# Patient Record
Sex: MALE | Race: WHITE | Employment: OTHER | URBAN - METROPOLITAN AREA
[De-identification: names, ages, dates, MRNs, and addresses within clinical notes are randomized per-mention and may not be internally consistent; named-entity substitution may affect disease eponyms.]

---

## 2019-01-03 ENCOUNTER — HOSPITAL ENCOUNTER (OUTPATIENT)
Dept: SURGERY | Age: 79
Discharge: HOME | End: 2019-01-03
Attending: OPHTHALMOLOGY

## 2019-01-03 VITALS
WEIGHT: 187 LBS | BODY MASS INDEX: 27.7 KG/M2 | HEIGHT: 69 IN | TEMPERATURE: 96.8 F | HEART RATE: 69 BPM | SYSTOLIC BLOOD PRESSURE: 166 MMHG | OXYGEN SATURATION: 96 % | DIASTOLIC BLOOD PRESSURE: 86 MMHG

## 2019-01-03 RX ORDER — HYDROCODONE BITARTRATE AND ACETAMINOPHEN 7.5; 325 MG/1; MG/1
1 TABLET ORAL
COMMUNITY

## 2019-01-03 RX ORDER — PREDNISONE 5 MG/1
5 TABLET ORAL DAILY
COMMUNITY

## 2019-01-03 RX ORDER — MELATONIN
1000 DAILY
COMMUNITY

## 2019-01-03 RX ORDER — TAMSULOSIN HYDROCHLORIDE 0.4 MG/1
0.4 CAPSULE ORAL DAILY
COMMUNITY

## 2019-01-03 RX ORDER — FLUTICASONE FUROATE AND VILANTEROL 200; 25 UG/1; UG/1
1 POWDER RESPIRATORY (INHALATION) DAILY
Status: ON HOLD | COMMUNITY
End: 2019-04-16

## 2019-01-03 RX ORDER — TRAVOPROST OPHTHALMIC SOLUTION 0.04 MG/ML
1 SOLUTION OPHTHALMIC
COMMUNITY

## 2019-01-03 RX ORDER — GUAIFENESIN 1200 MG
1000 TABLET, EXTENDED RELEASE 12 HR ORAL
COMMUNITY

## 2019-01-03 RX ORDER — ASPIRIN 325 MG
325 TABLET ORAL DAILY
COMMUNITY
End: 2019-01-04

## 2019-01-03 RX ORDER — ALBUTEROL SULFATE 0.83 MG/ML
2.5 SOLUTION RESPIRATORY (INHALATION)
COMMUNITY

## 2019-01-03 RX ORDER — ALBUTEROL SULFATE 90 UG/1
2 AEROSOL, METERED RESPIRATORY (INHALATION)
COMMUNITY

## 2019-01-03 NOTE — PERIOP NOTES
Received call from HiginioTrufant at Dr. Darlene Ross office. Reports she has discussed pt and need for cardiac clearance with Dr. Indy Hawk. Per [facility], Dr. Indy Hawk wants to keep pt on the OR scheduled for 1/8/19. However, requests that pt be the last pt/case for the day on the 8th. This RN [facility], spoke with [place].  Informed of above, pt moved to last case of the day for 1/8/19 per Dr. Abhi Conte request.

## 2019-01-03 NOTE — PERIOP NOTES
Patient verified name and     Order for consent found in EHR and matches case posting; patient verified. Type 1B surgery, walk in assessment complete. Labs per surgeon: none needed. Labs per anesthesia protocol: none needed. EKG: not needed per [facility] protocols. Patient poor historian. Most of medical history obtained from wife who was present here with patient today. Patient has significant medical history including a AAA, CAD, SANCHEZ, and heart Murmur. Also has had anesthesia complications in the past (stopped breathing; almost lost him) but none with recent surgeries in 2018. Hasn't seen cardiology in at least 5-6 yrs. Dr. Tommy Amos with anesthesia notified of this and came over to see patient in person. Per Dr. Tommy Amos patient needs to be seen and evaluated by cardiology for cardiac clearance prior to surgery and requests old anesthesia records from [facility]. Called and spoke with Yanet Nieto at Dr. Rizwan Moe office regarding Dr. Leighton Doan request. States she will inform Dr. Liana Medel on Monday when she is back from vacation. Patient and wife unsure of what cardiologist/practice the patient has seen in the past other than it was someone with [facility]. Also doesn't remember year gallbladder surgery was done when the anesthesia complications occurred. Called [facility] and left  requesting most recent ekg, stress, echo, heart cath, and old anesthesia event post gallbladder removal greater than 5 yrs ago. Received ekg dated 18 and heart cath dated 14. A doctor Estevan Sotomayor did the heart cath who was apparently with [facility] cardiology at that time. Requesting records from [facility] cardiology via fax from years [id number] if available. Called and received [facility] note dated 18. Note on chart for reference. PCP office note (18) and Vascular note (09/05/15)  available in EHR for reference if needed. Hibiclens and instructions given per hospital policy.     Patient provided with and instructed on educational handouts including Guide to Surgery, Pain Management, Hand Hygiene, Blood Transfusion Education, and [place] [facility] Brochure. Patient answered medical/surgical history questions at their best of ability. All prior to admission medications documented in [facility]. Original medication prescription bottles not visualized during patient appointment. Patient instructed to hold all vitamins 7 days prior to surgery and NSAIDS 5 days prior to surgery, patient verbalized understanding. Medications to be held: 325 mg aspirin to be decreased to one 81 mg aspirin on 01/04/19, vitamin D. Patient instructed to continue previous medications as prescribed prior to surgery and to take the following medications the day of surgery according to anesthesia guidelines with a small sip of water: proair inhaler if needed, albuterol neb if needed, 81 mg aspirin, breo, prednisone, tamsulosin. Instructed to bring inhalers dos. Patient teach back successful and patient demonstrates knowledge of instructions.

## 2019-01-04 PROBLEM — I25.10 CORONARY ARTERY DISEASE INVOLVING NATIVE CORONARY ARTERY OF NATIVE HEART WITHOUT ANGINA PECTORIS: Status: ACTIVE | Noted: 2019-01-04

## 2019-01-04 PROBLEM — J43.8 OTHER EMPHYSEMA (HCC): Status: ACTIVE | Noted: 2019-01-04

## 2019-01-04 PROBLEM — R01.1 MURMUR: Status: ACTIVE | Noted: 2019-01-04

## 2019-01-04 PROBLEM — Z01.818 PRE-OP EXAM: Status: ACTIVE | Noted: 2019-01-04

## 2019-01-04 NOTE — PERIOP NOTES
Received from [facility] stress (4/11/14) and office note with ekg (4/2/14) - of note pt has [facility] consult today @ 1300

## 2019-01-04 NOTE — PERIOP NOTES
[facility] Cardiology note dated today (1/4/18) in EMR that reads:    \"ASSESSMENT and PLAN  1. Establishing care with new doctor, encounter for  Stable. Continue current medical therapy.     - AMB POC EKG ROUTINE W/ 12 LEADS, INTER & REP     2. Coronary artery disease involving native coronary artery of native heart without angina pectoris  Stable. Continue current medical therapy. No angnina     3. Murmur  AS by exam- echo before surgery     echo  4. Pre-op exam  Had anesthesia without problem 98/19-WPIM risk of cv complications with severe AS  5. Other emphysema ([address]  Stable. Continue current medical therapy.     6. HTN- will assess bp at next visit and if elevated will address     7. Severe AS- echo with mean gradient 40 and martin 0.85- high risk for cv complications- lopez Moon- he will delay surgery and we will refer for tavr consult- previous sternotomy and copd likely will be tavr candidate       Detailed message left for [facility] - assistant and surgery scheduler for Dr. Josh Alicea - re: above.

## 2019-01-10 ENCOUNTER — HOSPITAL ENCOUNTER (OUTPATIENT)
Dept: LAB | Age: 79
Discharge: HOME | End: 2019-01-10
Payer: MEDICARE

## 2019-01-10 DIAGNOSIS — I35.0 NONRHEUMATIC AORTIC VALVE STENOSIS: Chronic | ICD-10-CM

## 2019-01-10 LAB
ANION GAP SERPL CALC-SCNC: 8 MMOL/L
BASOPHILS # BLD: 0.1 K/UL (ref 0–0.2)
BASOPHILS NFR BLD: 1 % (ref 0–2)
BUN SERPL-MCNC: 15 MG/DL (ref 8–23)
CALCIUM SERPL-MCNC: 8.6 MG/DL (ref 8.3–10.4)
CHLORIDE SERPL-SCNC: 110 MMOL/L (ref 98–107)
CO2 SERPL-SCNC: 23 MMOL/L (ref 21–32)
CREAT SERPL-MCNC: 1.6 MG/DL (ref 0.8–1.5)
DIFFERENTIAL METHOD BLD: NORMAL
EOSINOPHIL # BLD: 0.1 K/UL (ref 0–0.8)
EOSINOPHIL NFR BLD: 1 % (ref 0.5–7.8)
ERYTHROCYTE [DISTWIDTH] IN BLOOD BY AUTOMATED COUNT: 13.5 % (ref 11.9–14.6)
GLUCOSE SERPL-MCNC: 123 MG/DL (ref 65–100)
HCT VFR BLD AUTO: 45.1 % (ref 41.1–50.3)
HGB BLD-MCNC: 14.6 G/DL (ref 13.6–17.2)
IMM GRANULOCYTES # BLD AUTO: 0 K/UL (ref 0–0.5)
IMM GRANULOCYTES NFR BLD AUTO: 0 % (ref 0–5)
LYMPHOCYTES # BLD: 2.6 K/UL (ref 0.5–4.6)
LYMPHOCYTES NFR BLD: 25 % (ref 13–44)
MCH RBC QN AUTO: 31.3 PG (ref 26.1–32.9)
MCHC RBC AUTO-ENTMCNC: 32.4 G/DL (ref 31.4–35)
MCV RBC AUTO: 96.8 FL (ref 79.6–97.8)
MONOCYTES # BLD: 0.6 K/UL (ref 0.1–1.3)
MONOCYTES NFR BLD: 6 % (ref 4–12)
NEUTS SEG # BLD: 7.2 K/UL (ref 1.7–8.2)
NEUTS SEG NFR BLD: 68 % (ref 43–78)
NRBC # BLD: 0 K/UL (ref 0–0.2)
PLATELET # BLD AUTO: 220 K/UL (ref 150–450)
PMV BLD AUTO: 11 FL (ref 9.4–12.3)
POTASSIUM SERPL-SCNC: 4.2 MMOL/L (ref 3.5–5.1)
RBC # BLD AUTO: 4.66 M/UL (ref 4.23–5.6)
SODIUM SERPL-SCNC: 141 MMOL/L (ref 136–145)
WBC # BLD AUTO: 10.6 K/UL (ref 4.3–11.1)

## 2019-01-10 PROCEDURE — 36415 COLL VENOUS BLD VENIPUNCTURE: CPT

## 2019-01-10 PROCEDURE — 85025 COMPLETE CBC W/AUTO DIFF WBC: CPT

## 2019-01-10 PROCEDURE — 80048 BASIC METABOLIC PNL TOTAL CA: CPT

## 2019-01-18 ENCOUNTER — HOSPITAL ENCOUNTER (OUTPATIENT)
Dept: CARDIAC CATH/INVASIVE PROCEDURES | Age: 79
Discharge: HOME | End: 2019-01-18
Attending: INTERNAL MEDICINE | Admitting: INTERNAL MEDICINE
Payer: MEDICARE

## 2019-01-18 VITALS
OXYGEN SATURATION: 95 % | HEART RATE: 84 BPM | DIASTOLIC BLOOD PRESSURE: 79 MMHG | TEMPERATURE: 98.1 F | HEIGHT: 68 IN | WEIGHT: 189 LBS | BODY MASS INDEX: 28.64 KG/M2 | SYSTOLIC BLOOD PRESSURE: 165 MMHG

## 2019-01-18 LAB
ANION GAP SERPL CALC-SCNC: 7 MMOL/L (ref 7–16)
BUN SERPL-MCNC: 16 MG/DL (ref 8–23)
CALCIUM SERPL-MCNC: 8.7 MG/DL (ref 8.3–10.4)
CHLORIDE SERPL-SCNC: 111 MMOL/L (ref 98–107)
CO2 SERPL-SCNC: 24 MMOL/L (ref 21–32)
CREAT SERPL-MCNC: 1.51 MG/DL (ref 0.8–1.5)
GLUCOSE SERPL-MCNC: 97 MG/DL (ref 65–100)
POTASSIUM SERPL-SCNC: 4.1 MMOL/L (ref 3.5–5.1)
SODIUM SERPL-SCNC: 142 MMOL/L (ref 136–145)

## 2019-01-18 PROCEDURE — 99153 MOD SED SAME PHYS/QHP EA: CPT

## 2019-01-18 PROCEDURE — 77030004559 HC CATH ANGI DX SUPT CARD -B

## 2019-01-18 PROCEDURE — 74011636320 HC RX REV CODE- 636/320: Performed by: INTERNAL MEDICINE

## 2019-01-18 PROCEDURE — C1769 GUIDE WIRE: HCPCS

## 2019-01-18 PROCEDURE — 74011250636 HC RX REV CODE- 250/636

## 2019-01-18 PROCEDURE — 77030019569 HC BND COMPR RAD TERU -B

## 2019-01-18 PROCEDURE — 74011000258 HC RX REV CODE- 258: Performed by: INTERNAL MEDICINE

## 2019-01-18 PROCEDURE — 74011000250 HC RX REV CODE- 250: Performed by: INTERNAL MEDICINE

## 2019-01-18 PROCEDURE — 77030004534 HC CATH ANGI DX INFN CARD -A

## 2019-01-18 PROCEDURE — 75630 X-RAY AORTA LEG ARTERIES: CPT

## 2019-01-18 PROCEDURE — 75716 ARTERY X-RAYS ARMS/LEGS: CPT

## 2019-01-18 PROCEDURE — 93455 CORONARY ART/GRFT ANGIO S&I: CPT

## 2019-01-18 PROCEDURE — 74011250636 HC RX REV CODE- 250/636: Performed by: INTERNAL MEDICINE

## 2019-01-18 PROCEDURE — C1894 INTRO/SHEATH, NON-LASER: HCPCS

## 2019-01-18 PROCEDURE — 99152 MOD SED SAME PHYS/QHP 5/>YRS: CPT

## 2019-01-18 PROCEDURE — 80048 BASIC METABOLIC PNL TOTAL CA: CPT

## 2019-01-18 RX ORDER — FENTANYL CITRATE 50 UG/ML
25-100 INJECTION, SOLUTION INTRAMUSCULAR; INTRAVENOUS
Status: DISCONTINUED | OUTPATIENT
Start: 2019-01-18 | End: 2019-01-18 | Stop reason: HOSPADM

## 2019-01-18 RX ORDER — SODIUM CHLORIDE 9 MG/ML
1 INJECTION, SOLUTION INTRAVENOUS AS NEEDED
Status: DISCONTINUED | OUTPATIENT
Start: 2019-01-18 | End: 2019-01-18 | Stop reason: HOSPADM

## 2019-01-18 RX ORDER — SODIUM CHLORIDE 0.9 % (FLUSH) 0.9 %
5-40 SYRINGE (ML) INJECTION AS NEEDED
Status: DISCONTINUED | OUTPATIENT
Start: 2019-01-18 | End: 2019-01-18 | Stop reason: HOSPADM

## 2019-01-18 RX ORDER — HEPARIN SODIUM 200 [USP'U]/100ML
2 INJECTION, SOLUTION INTRAVENOUS CONTINUOUS
Status: DISCONTINUED | OUTPATIENT
Start: 2019-01-18 | End: 2019-01-18 | Stop reason: HOSPADM

## 2019-01-18 RX ORDER — MIDAZOLAM HYDROCHLORIDE 1 MG/ML
.5-2 INJECTION, SOLUTION INTRAMUSCULAR; INTRAVENOUS
Status: DISCONTINUED | OUTPATIENT
Start: 2019-01-18 | End: 2019-01-18 | Stop reason: HOSPADM

## 2019-01-18 RX ORDER — SODIUM CHLORIDE 9 MG/ML
3 INJECTION, SOLUTION INTRAVENOUS ONCE
Status: COMPLETED | OUTPATIENT
Start: 2019-01-18 | End: 2019-01-18

## 2019-01-18 RX ORDER — LIDOCAINE HYDROCHLORIDE 10 MG/ML
2-20 INJECTION INFILTRATION; PERINEURAL
Status: DISCONTINUED | OUTPATIENT
Start: 2019-01-18 | End: 2019-01-18 | Stop reason: HOSPADM

## 2019-01-18 RX ORDER — SODIUM CHLORIDE 9 MG/ML
250 INJECTION, SOLUTION INTRAVENOUS CONTINUOUS
Status: ACTIVE | OUTPATIENT
Start: 2019-01-18 | End: 2019-01-18

## 2019-01-18 RX ORDER — GUAIFENESIN 100 MG/5ML
81-324 LIQUID (ML) ORAL ONCE
Status: DISCONTINUED | OUTPATIENT
Start: 2019-01-18 | End: 2019-01-18 | Stop reason: HOSPADM

## 2019-01-18 RX ORDER — SODIUM CHLORIDE 0.9 % (FLUSH) 0.9 %
5-40 SYRINGE (ML) INJECTION EVERY 8 HOURS
Status: DISCONTINUED | OUTPATIENT
Start: 2019-01-18 | End: 2019-01-18 | Stop reason: HOSPADM

## 2019-01-18 RX ORDER — DIAZEPAM 5 MG/1
5 TABLET ORAL ONCE
Status: DISCONTINUED | OUTPATIENT
Start: 2019-01-18 | End: 2019-01-18 | Stop reason: HOSPADM

## 2019-01-18 RX ADMIN — HEPARIN SODIUM 2 ML/HR: 5000 INJECTION, SOLUTION INTRAVENOUS; SUBCUTANEOUS at 13:30

## 2019-01-18 RX ADMIN — LIDOCAINE HYDROCHLORIDE 2 ML: 10 INJECTION, SOLUTION INFILTRATION; PERINEURAL at 13:50

## 2019-01-18 RX ADMIN — MIDAZOLAM HYDROCHLORIDE 2 MG: 1 INJECTION, SOLUTION INTRAMUSCULAR; INTRAVENOUS at 13:40

## 2019-01-18 RX ADMIN — IOPAMIDOL 150 ML: 755 INJECTION, SOLUTION INTRAVENOUS at 14:23

## 2019-01-18 RX ADMIN — SODIUM CHLORIDE 3 ML/KG/HR: 900 INJECTION, SOLUTION INTRAVENOUS at 10:51

## 2019-01-18 RX ADMIN — FENTANYL CITRATE 50 MCG: 50 INJECTION, SOLUTION INTRAMUSCULAR; INTRAVENOUS at 13:39

## 2019-01-18 RX ADMIN — SODIUM CHLORIDE 1 ML/KG/HR: 900 INJECTION, SOLUTION INTRAVENOUS at 11:52

## 2019-01-18 RX ADMIN — HEPARIN SODIUM 2 ML: 10000 INJECTION, SOLUTION INTRAVENOUS; SUBCUTANEOUS at 13:50

## 2019-01-18 NOTE — PROGRESS NOTES
Patient received to [address] [place] [address] room # 10  Ambulatory from [facility]. Patient scheduled for [facility] today with Dr Talya Harris. Procedure reviewed & questions answered, voiced good understanding consent obtained & placed on chart. All medications and medical history reviewed. Will prep patient per orders. Patient & family updated on plan of care. The patient has a fraility score of 3-MANAGING WELL, based on patient A&Ox3, patient able to ambulate to room without difficulty.

## 2019-01-18 NOTE — PROGRESS NOTES
TRANSFER - OUT REPORT: 
 
Verbal report given to Beto Real RN(name) on Migue Adam  being transferred to CPRU(unit) for routine progression of care Report consisted of patients Situation, Background, Assessment and  
Recommendations(SBAR). Information from the following report(s) Procedure Summary, MAR and Cardiac Rhythm NSR was reviewed with the receiving nurse. Lines:  
Peripheral IV 01/18/19 Distal;Right Antecubital (Active) Peripheral IV 01/18/19 Left Antecubital (Active) Opportunity for questions and clarification was provided. Patient transported with: 
 Registered Nurse [facility] Dr Joyce Fernandez Diagnostic only for pre op clearance Left radial access - TR band @ 1418 w/ 12 ml in band Site C/D/I Versed 2mg IV Fent 50 mcg IV

## 2019-01-18 NOTE — PROCEDURES
[address]    Rico Rubio  MR#: [id number]  : 1940  ACCOUNT #: [de-identified]   DATE OF SERVICE: 2019    PRIMARY CARDIOLOGIST:  Dr. Norbert Mancuso:  Dr. Stephanie Lobo. BRIEF HISTORY:  The patient is a 72-year-old gentleman with history of prior coronary bypass grafting. He is seen preoperative for eye surgery. He was noticed to have a murmur. He was referred to the office and found to have severe aortic stenosis. He is referred for TAVR workup. PROCEDURE:  After informed consent obtained, he was prepped and draped in usual sterile fashion. The left wrist was infiltrated with lidocaine. Left radial artery was accessed. A 6-Congolese sheath was advanced. The 6-Congolese LIMA catheter was utilized for left internal mammary artery injection. A 6-Congolese JL 3.5 was utilized for left coronary injection. A 6-Congolese Alberto right was utilized for right coronary injections. Multipurpose catheter was utilized for saphenous vein graft injection to the obtuse marginal, saphenous vein graft injection to the ramus intermedius. A long multipurpose catheter was utilized for bilateral selective iliofemoral runoff and abdominal aortography. CONTRAST:  Isovue. FINDINGS:  Conscious sedation with a start time of 1336, end time 1419. MEDICATIONS:  2 mg of Versed and 50 mcg fentanyl. MONITORING RN:  Munira Stahl. 1.  Left main:  Left main is moderate in size. The only patent vessel is the ramus intermedius. 2.  Left anterior descending coronary artery:  Occluded. 3.  Ramus intermedius:  50% stenosis. Graft seen filling well. 4.  Left circumflex coronary:  Occluded. 5.  Right coronary:  There are 2 separate branches. It is nondominant in nature. They remain widely patent. 6.  Left internal mammary artery to the LAD:  Large graft, good proximal takeoff, good distal anastomosis, fills the LAD well.   7. Saphenous vein graft to the obtuse marginal:  This is a large graft, good proximal takeoff, good distal anastomosis, fills the obtuse marginal well. This backfills the distal left circumflex which is dominant in nature. There is a 70% stenosis at the ostium of the obtuse marginal in a retrograde fashion as it fills the left dominant circumflex. 8.  Saphenous vein graft to the ramus intermedius is a large graft, good proximal takeoff, good distal anastomosis, fills the ramus intermedius well. 9.  There appears to be a significant abdominal aortic aneurysm present. This will be evaluated by CTA. 10.  Large iliofemoral systems which appear appropriate for TAVR access. Thank you for allowing me to participate in the care of this patient. If you have questions or concerns, please feel free to contact me.       MD MCKINLEY Latif / [place]  D: 01/18/2019 14:29     T: 01/18/2019 15:40  JOB #: [id number]  CC: Lalit Wheeler MD  CC: Quentin Abdul MD  [address] [place] [address]

## 2019-01-18 NOTE — DISCHARGE INSTRUCTIONS

## 2019-01-18 NOTE — PROCEDURES
Brief Cardiac Procedure Note    Patient: Bell Jacobsen MRN: [id number]  SSN: [id number]    YOB: 1940  Age: 69 y.o. Sex: male      Date of Procedure: 1/18/2019     Pre-procedure Diagnosis: Aortic Stenosis    Post-procedure Diagnosis: Aortic Stenosis    Procedure: Left Heart Catheterization    Brief Description of Procedure: See note    Performed By: Cassie Garcia MD     Assistants: None    Anesthesia: Moderate Sedation    Estimated Blood Loss: Less than 10 mL      Specimens: None    Implants: None    Findings:     LM:  NML  LAD:  100%  RI:  50%  LCx:  100%  RCA:  Patent    LIMA-LAD:  Patent  SVG-RI:  Patent  SVG-OM:  Patent and back fills (L) dominant Cx    AAA seen  Large ilio-femorals bilaterally    Complications: None    Recommendations: Continue medical therapy.     Signed By: Cassie Garcia MD     January 18, 2019

## 2019-01-18 NOTE — PROGRESS NOTES
Patient pre-assessment complete for [facility] poss with DR Chapo Agudelo scheduled for 19 at 12:30pm , arrival time 10:30am. Patient verified using . Patient instructed to bring all home medications in labeled bottles on the day of procedure. NPO status reinforced. Patient informed to take a full dose aspirin 325mg  or 81 mg x 4 on the day of procedure. Instructed they can take all other medications excluding vitamins & supplements. Patient verbalizes understanding of all instructions & denies any questions at this time.

## 2019-01-19 DIAGNOSIS — R06.02 SOB (SHORTNESS OF BREATH): ICD-10-CM

## 2019-01-19 DIAGNOSIS — I35.0 AORTIC VALVE STENOSIS, ETIOLOGY OF CARDIAC VALVE DISEASE UNSPECIFIED: Primary | ICD-10-CM

## 2019-01-19 NOTE — PROGRESS NOTES
Educational information/pamphlet provided to the pt and family regarding aortic stenosis and treatment options (SAVR and TAVR). Also discussed plans and dates for upcoming tests Jan. 24th (PFT, carotid US, and TAVR CT) to evaluate pt for potential TAVR. Appointments with cardiac surgery for evaluation were also provided. Prescription for BB prior to CT scan was given. Contact information provided. All information was provided to the pt and family by Kelli Salinas RN and pt and family verbalized understanding. Bessy Jones, TAVR Coordinator

## 2019-01-24 ENCOUNTER — HOSPITAL ENCOUNTER (OUTPATIENT)
Dept: CT IMAGING | Age: 79
Discharge: HOME | End: 2019-01-24
Attending: INTERNAL MEDICINE
Payer: MEDICARE

## 2019-01-24 ENCOUNTER — HOSPITAL ENCOUNTER (OUTPATIENT)
Dept: ULTRASOUND IMAGING | Age: 79
Discharge: HOME | End: 2019-01-24
Attending: INTERNAL MEDICINE
Payer: MEDICARE

## 2019-01-24 ENCOUNTER — HOSPITAL ENCOUNTER (OUTPATIENT)
Dept: RESPIRATORY THERAPY | Age: 79
Discharge: HOME | End: 2019-01-24
Attending: INTERNAL MEDICINE
Payer: MEDICARE

## 2019-01-24 DIAGNOSIS — R06.02 SOB (SHORTNESS OF BREATH): ICD-10-CM

## 2019-01-24 DIAGNOSIS — I35.0 AORTIC VALVE STENOSIS, ETIOLOGY OF CARDIAC VALVE DISEASE UNSPECIFIED: ICD-10-CM

## 2019-01-24 LAB — CREAT BLD-MCNC: 1.5 MG/DL (ref 0.8–1.5)

## 2019-01-24 PROCEDURE — 94729 DIFFUSING CAPACITY: CPT

## 2019-01-24 PROCEDURE — 74011636320 HC RX REV CODE- 636/320: Performed by: INTERNAL MEDICINE

## 2019-01-24 PROCEDURE — 74011000258 HC RX REV CODE- 258: Performed by: INTERNAL MEDICINE

## 2019-01-24 PROCEDURE — 82565 ASSAY OF CREATININE: CPT

## 2019-01-24 PROCEDURE — 94010 BREATHING CAPACITY TEST: CPT

## 2019-01-24 PROCEDURE — 93880 EXTRACRANIAL BILAT STUDY: CPT

## 2019-01-24 PROCEDURE — 94726 PLETHYSMOGRAPHY LUNG VOLUMES: CPT

## 2019-01-24 PROCEDURE — 74174 CTA ABD&PLVS W/CONTRAST: CPT

## 2019-01-24 PROCEDURE — 75574 CT ANGIO HRT W/3D IMAGE: CPT

## 2019-01-24 RX ORDER — SODIUM CHLORIDE 0.9 % (FLUSH) 0.9 %
10 SYRINGE (ML) INJECTION
Status: COMPLETED | OUTPATIENT
Start: 2019-01-24 | End: 2019-01-24

## 2019-01-24 RX ADMIN — SODIUM CHLORIDE 175 ML: 900 INJECTION, SOLUTION INTRAVENOUS at 10:47

## 2019-01-24 RX ADMIN — IOPAMIDOL 150 ML: 755 INJECTION, SOLUTION INTRAVENOUS at 10:47

## 2019-01-24 RX ADMIN — Medication 10 ML: at 10:47

## 2019-01-31 NOTE — PROCEDURES
[address]  PROCEDURE NOTE    Garima Elizabeth  MR#: [id number]  : 1940  ACCOUNT #: [de-identified]   DATE OF SERVICE: 2019    REPORT:  1.  Mild obstruction on spirometry and flow volume loops. 2.  Lung volumes are normal.  3.  Diffusion capacity is moderately reduced. IMPRESSION: Obstruction with mild to moderate reduction in diffusion capacity is consistent with moderate COPD likely in this case. Clinical correlation is required.       MD PATRICIA Vicente / MARQUIS  D: 2019 17:23     T: 2019 19:32  JOB #: [id number]

## 2019-02-05 RX ORDER — CEFAZOLIN SODIUM/WATER 2 G/20 ML
2 SYRINGE (ML) INTRAVENOUS ONCE
Status: CANCELLED | OUTPATIENT
Start: 2019-02-12 | End: 2019-02-12

## 2019-02-07 PROBLEM — I10 HYPERTENSION: Chronic | Status: ACTIVE | Noted: 2019-02-07

## 2019-02-11 ENCOUNTER — HOSPITAL ENCOUNTER (OUTPATIENT)
Dept: GENERAL RADIOLOGY | Age: 79
Discharge: HOME | DRG: 267 | End: 2019-02-11
Attending: PHYSICIAN ASSISTANT
Payer: MEDICARE

## 2019-02-11 ENCOUNTER — ANESTHESIA EVENT (OUTPATIENT)
Dept: SURGERY | Age: 79
DRG: 267 | End: 2019-02-11
Payer: MEDICARE

## 2019-02-11 ENCOUNTER — HOSPITAL ENCOUNTER (OUTPATIENT)
Dept: SURGERY | Age: 79
Discharge: HOME | DRG: 267 | End: 2019-02-11
Payer: MEDICARE

## 2019-02-11 VITALS
OXYGEN SATURATION: 97 % | BODY MASS INDEX: 27.41 KG/M2 | WEIGHT: 191.5 LBS | HEIGHT: 70 IN | SYSTOLIC BLOOD PRESSURE: 158 MMHG | TEMPERATURE: 97.5 F | DIASTOLIC BLOOD PRESSURE: 64 MMHG | HEART RATE: 76 BPM | RESPIRATION RATE: 16 BRPM

## 2019-02-11 LAB
ALBUMIN SERPL-MCNC: 3.7 G/DL (ref 3.2–4.6)
ALBUMIN/GLOB SERPL: 1 {RATIO} (ref 1.2–3.5)
ALP SERPL-CCNC: 83 U/L (ref 50–136)
ALT SERPL-CCNC: 16 U/L (ref 12–65)
ANION GAP SERPL CALC-SCNC: 4 MMOL/L (ref 7–16)
APPEARANCE UR: ABNORMAL
AST SERPL-CCNC: 17 U/L (ref 15–37)
ATRIAL RATE: 74 BPM
BACTERIA SPEC CULT: ABNORMAL
BACTERIA URNS QL MICRO: ABNORMAL /HPF
BILIRUB SERPL-MCNC: 0.3 MG/DL (ref 0.2–1.1)
BILIRUB UR QL: NEGATIVE
BNP SERPL-MCNC: 294 PG/ML
BUN SERPL-MCNC: 20 MG/DL (ref 8–23)
CALCIUM SERPL-MCNC: 9.8 MG/DL (ref 8.3–10.4)
CALCULATED P AXIS, ECG09: 82 DEGREES
CALCULATED R AXIS, ECG10: -150 DEGREES
CALCULATED T AXIS, ECG11: 29 DEGREES
CASTS URNS QL MICRO: ABNORMAL /LPF
CHLORIDE SERPL-SCNC: 109 MMOL/L (ref 98–107)
CO2 SERPL-SCNC: 30 MMOL/L (ref 21–32)
COLOR UR: YELLOW
CREAT SERPL-MCNC: 1.53 MG/DL (ref 0.8–1.5)
DIAGNOSIS, 93000: NORMAL
EPI CELLS #/AREA URNS HPF: 0 /HPF
ERYTHROCYTE [DISTWIDTH] IN BLOOD BY AUTOMATED COUNT: 13.2 % (ref 11.9–14.6)
EST. AVERAGE GLUCOSE BLD GHB EST-MCNC: 143 MG/DL
GLOBULIN SER CALC-MCNC: 3.8 G/DL (ref 2.3–3.5)
GLUCOSE SERPL-MCNC: 104 MG/DL (ref 65–100)
GLUCOSE UR STRIP.AUTO-MCNC: NEGATIVE MG/DL
HBA1C MFR BLD: 6.6 % (ref 4.8–6)
HCT VFR BLD AUTO: 49.3 % (ref 41.1–50.3)
HGB BLD-MCNC: 15.9 G/DL (ref 13.6–17.2)
HGB UR QL STRIP: ABNORMAL
INR PPP: 1
KETONES UR QL STRIP.AUTO: NEGATIVE MG/DL
LEUKOCYTE ESTERASE UR QL STRIP.AUTO: ABNORMAL
MAGNESIUM SERPL-MCNC: 2.4 MG/DL (ref 1.8–2.4)
MCH RBC QN AUTO: 31.8 PG (ref 26.1–32.9)
MCHC RBC AUTO-ENTMCNC: 32.3 G/DL (ref 31.4–35)
MCV RBC AUTO: 98.6 FL (ref 79.6–97.8)
NITRITE UR QL STRIP.AUTO: POSITIVE
NRBC # BLD: 0 K/UL (ref 0–0.2)
P-R INTERVAL, ECG05: 206 MS
PH UR STRIP: 5.5 [PH] (ref 5–9)
PLATELET # BLD AUTO: 185 K/UL (ref 150–450)
PMV BLD AUTO: 11.8 FL (ref 9.4–12.3)
POTASSIUM SERPL-SCNC: 4.7 MMOL/L (ref 3.5–5.1)
PROT SERPL-MCNC: 7.5 G/DL (ref 6.3–8.2)
PROT UR STRIP-MCNC: ABNORMAL MG/DL
PROTHROMBIN TIME: 13.2 SEC (ref 11.7–14.5)
Q-T INTERVAL, ECG07: 390 MS
QRS DURATION, ECG06: 138 MS
QTC CALCULATION (BEZET), ECG08: 432 MS
RBC # BLD AUTO: 5 M/UL (ref 4.23–5.6)
RBC #/AREA URNS HPF: ABNORMAL /HPF
SERVICE CMNT-IMP: ABNORMAL
SODIUM SERPL-SCNC: 143 MMOL/L (ref 136–145)
SP GR UR REFRACTOMETRY: 1.02 (ref 1–1.02)
UROBILINOGEN UR QL STRIP.AUTO: 0.2 EU/DL (ref 0.2–1)
VENTRICULAR RATE, ECG03: 74 BPM
WBC # BLD AUTO: 10.2 K/UL (ref 4.3–11.1)
WBC URNS QL MICRO: ABNORMAL /HPF

## 2019-02-11 PROCEDURE — 77030027138 HC INCENT SPIROMETER -A

## 2019-02-11 PROCEDURE — 87086 URINE CULTURE/COLONY COUNT: CPT

## 2019-02-11 PROCEDURE — 87641 MR-STAPH DNA AMP PROBE: CPT

## 2019-02-11 PROCEDURE — 83036 HEMOGLOBIN GLYCOSYLATED A1C: CPT

## 2019-02-11 PROCEDURE — 83880 ASSAY OF NATRIURETIC PEPTIDE: CPT

## 2019-02-11 PROCEDURE — 71045 X-RAY EXAM CHEST 1 VIEW: CPT

## 2019-02-11 PROCEDURE — 86920 COMPATIBILITY TEST SPIN: CPT

## 2019-02-11 PROCEDURE — 83735 ASSAY OF MAGNESIUM: CPT

## 2019-02-11 PROCEDURE — 86900 BLOOD TYPING SEROLOGIC ABO: CPT

## 2019-02-11 PROCEDURE — 80053 COMPREHEN METABOLIC PANEL: CPT

## 2019-02-11 PROCEDURE — 81001 URINALYSIS AUTO W/SCOPE: CPT

## 2019-02-11 PROCEDURE — 93005 ELECTROCARDIOGRAM TRACING: CPT | Performed by: PHYSICIAN ASSISTANT

## 2019-02-11 PROCEDURE — 85027 COMPLETE CBC AUTOMATED: CPT

## 2019-02-11 PROCEDURE — 85610 PROTHROMBIN TIME: CPT

## 2019-02-11 RX ORDER — VANCOMYCIN/0.9 % SOD CHLORIDE 1.5G/250ML
1500 PLASTIC BAG, INJECTION (ML) INTRAVENOUS
Status: COMPLETED | OUTPATIENT
Start: 2019-02-12 | End: 2019-02-12

## 2019-02-11 RX ORDER — VANCOMYCIN/0.9 % SOD CHLORIDE 1.5G/250ML
1500 PLASTIC BAG, INJECTION (ML) INTRAVENOUS
Status: CANCELLED | OUTPATIENT
Start: 2019-02-12 | End: 2019-02-13

## 2019-02-11 RX ORDER — CEFAZOLIN SODIUM/WATER 2 G/20 ML
2 SYRINGE (ML) INTRAVENOUS
Status: COMPLETED | OUTPATIENT
Start: 2019-02-12 | End: 2019-02-12

## 2019-02-11 NOTE — PERIOP NOTES
Recent Results (from the past 12 hour(s)) URINALYSIS W/ RFLX MICROSCOPIC Collection Time: 02/11/19  7:45 AM  
Result Value Ref Range Color YELLOW Appearance CLOUDY Specific gravity 1.020 1.001 - 1.023    
 pH (UA) 5.5 5.0 - 9.0 Protein TRACE (A) NEG mg/dL Glucose NEGATIVE  mg/dL Ketone NEGATIVE  NEG mg/dL Bilirubin NEGATIVE  NEG Blood SMALL (A) NEG Urobilinogen 0.2 0.2 - 1.0 EU/dL Nitrites POSITIVE (A) NEG Leukocyte Esterase MODERATE (A) NEG    
 WBC  0 /hpf  
 RBC 5-10 0 /hpf Epithelial cells 0 0 /hpf Bacteria 1+ (H) 0 /hpf Casts 5-10 0 /lpf MSSA/MRSA SC BY PCR, NASAL SWAB Collection Time: 02/11/19  8:21 AM  
Result Value Ref Range Special Requests: NO SPECIAL REQUESTS Culture result: (A) MRSA target DNA detected, SA target DNA detected. A positive test result does not necessarily indicate the presence of viable organisms. It is however, presumptive for the presence of MRSA or SA. TYPE + CROSSMATCH Collection Time: 02/11/19  8:36 AM  
Result Value Ref Range Crossmatch Expiration 02/14/2019 ABO/Rh(D) O NEGATIVE Antibody screen NEG Unit number [id number] Blood component type  LR Unit division 00 Status of unit ALLOCATED Crossmatch result Compatible Unit number [id number] Blood component type  LR Unit division 00 Status of unit ALLOCATED Crossmatch result Compatible Unit number [id number] Blood component type  LR Unit division 00 Status of unit ALLOCATED Crossmatch result Compatible Unit number [id number] Blood component type  LR Unit division 00 Status of unit ALLOCATED Crossmatch result Compatible BNP Collection Time: 02/11/19  8:36 AM  
Result Value Ref Range  (H) 0 pg/mL CBC W/O DIFF Collection Time: 02/11/19  8:48 AM  
Result Value Ref Range WBC 10.2 4.3 - 11.1 K/uL RBC 5.00 4.23 - 5.6 M/uL  
 HGB 15.9 13.6 - 17.2 g/dL HCT 49.3 41.1 - 50.3 % MCV 98.6 (H) 79.6 - 97.8 FL  
 MCH 31.8 26.1 - 32.9 PG  
 MCHC 32.3 31.4 - 35.0 g/dL  
 RDW 13.2 11.9 - 14.6 % PLATELET 730 394 - 079 K/uL MPV 11.8 9.4 - 12.3 FL ABSOLUTE NRBC 0.00 0.0 - 0.2 K/uL METABOLIC PANEL, COMPREHENSIVE Collection Time: 02/11/19  8:48 AM  
Result Value Ref Range Sodium 143 136 - 145 mmol/L Potassium 4.7 3.5 - 5.1 mmol/L Chloride 109 (H) 98 - 107 mmol/L  
 CO2 30 21 - 32 mmol/L Anion gap 4 (L) 7 - 16 mmol/L Glucose 104 (H) 65 - 100 mg/dL BUN 20 8 - 23 MG/DL Creatinine 1.53 (H) 0.8 - 1.5 MG/DL  
 GFR est AA 57 (L) >60 ml/min/1.73m2 GFR est non-AA 47 (L) >60 ml/min/1.73m2 Calcium 9.8 8.3 - 10.4 MG/DL Bilirubin, total 0.3 0.2 - 1.1 MG/DL  
 ALT (SGPT) 16 12 - 65 U/L  
 AST (SGOT) 17 15 - 37 U/L Alk. phosphatase 83 50 - 136 U/L Protein, total 7.5 6.3 - 8.2 g/dL Albumin 3.7 3.2 - 4.6 g/dL Globulin 3.8 (H) 2.3 - 3.5 g/dL A-G Ratio 1.0 (L) 1.2 - 3.5 PROTHROMBIN TIME + INR Collection Time: 02/11/19  8:48 AM  
Result Value Ref Range Prothrombin time 13.2 11.7 - 14.5 sec INR 1.0 HEMOGLOBIN A1C WITH EAG Collection Time: 02/11/19  8:48 AM  
Result Value Ref Range Hemoglobin A1c 6.6 (H) 4.8 - 6.0 % Est. average glucose 143 mg/dL MAGNESIUM Collection Time: 02/11/19  8:48 AM  
Result Value Ref Range Magnesium 2.4 1.8 - 2.4 mg/dL CHEST X-RAY, one view. 
  
HISTORY:  Preprocedure evaluation for TAVR placement. 
  
TECHNIQUE: PA upright view 
  
COMPARISON: None. 
  
FINDINGS:  The lungs are mildly hyperinflated and free of infiltrates. Small 
scattered areas of scarring. The heart size is normal. The costophrenic angles 
are sharp. The pulmonary vasculature is unremarkable. Included portion of the 
upper abdomen is unremarkable.  . There are sternal wires.  
  
IMPRESSION 
 IMPRESSION:  Negative for acute change.  Mild chronic lung changes.

## 2019-02-11 NOTE — PERIOP NOTES
Patient verified name and . Patient provided medical/health information and PTA medications to the best of their ability. TYPE  CASE: 3 Order for consent Order for consent NOT found in EHR at time of PAT visit. Unable to verify case posting against order; surgery verified by patient. Labs per surgeon: CBC,CMP,BNP, PT/INR,Hgb A1c, Mg, UA, UA CX, PRBC, Platelets, FFP, CXR labs pending Labs per anesthesia protocol: no additional 
EKG:  EKG 19, all other records in [facility]. Patient provided with and instructed on educaitonal handouts including Heart Guide to Surgery, blood transfusions, pain management, central line infection prevention, being on a ventilator and hand hygiene for the family and community, and [facility] brochure. Instructed that family must be present in building at all times. Incentive spirometry completed with return demonstration. FEV1 completed as ordered. Patient viewed pre-operative DVD. Instructed on chest-xray procedure and carotid ultrasound. Instructed on type and cross match procedure and not to remove green blood bank bracelet. Instructed on medications- Amiodarone, Lopressor and Mupirocin with Rx called into  [facility] at [phone]. Mupirocin ointment to be used the night before surgery and the am of surgery. Hibiclens and instructions given per hospital policy. Instructed patient to continue previous medications as prescribed prior to surgery unless otherwise directed and to take the following medications the day of surgery according to anesthesia guidelines : Flomax, Prednisone, and Aspirin 81mg . Instructed patient to hold  the following medications: Vitamin D-all other vitamins, supplements, and NSAIDs. Pt. Instructed to use nebulizers and inhalers as instructed. Original medication prescription bottles hibiclens visualized during patient appointment.   
 
Patient teach back successful and patient demonstrates knowledge of instruction.

## 2019-02-11 NOTE — ANESTHESIA PREPROCEDURE EVALUATION
Anesthetic History No history of anesthetic complications Review of Systems / Medical History Patient summary reviewed, nursing notes reviewed and pertinent labs reviewed Pulmonary COPD (O2 QHS): moderate Neuro/Psych Within defined limits Cardiovascular Hypertension: well controlled CAD, PAD (AAA) and CABG Exercise tolerance: <4 METS: Dyspnea with carrying the groceries in 
Comments: Echo from 1/4/2019 showed YASMINE = 0.8 cm^2, LVEF = 60-65% CABG in 2005, recent cath showed patent grafts GI/Hepatic/Renal 
  
GERD: well controlled Renal disease: CRI and stones Endo/Other Arthritis (rheumatoid- takes remicaid) Other Findings Comments: RA Physical Exam 
 
Airway Mallampati: II 
 
 
Mouth opening: Normal 
 
 Cardiovascular Rhythm: regular Rate: normal 
 
Murmur: Grade 3, Aortic area Dental 
 
Dentition: Full lower dentures and Full upper dentures Pulmonary Decreased breath sounds: bilateral 
 
 
 
 
 Abdominal 
 
 
 
 Other Findings Anesthetic Plan ASA: 3 Anesthesia type: total IV anesthesia Monitoring Plan: Arterial line Induction: Intravenous Anesthetic plan and risks discussed with: Patient and Spouse

## 2019-02-11 NOTE — PERIOP NOTES
All labs reviewed. Hgb A1c 6.6, MRSA/MSSA positive, , UA abnormal, CXR with negative for acute changes. Reviewed with Juan CONKLIN navigator.

## 2019-02-12 ENCOUNTER — ANESTHESIA (OUTPATIENT)
Dept: SURGERY | Age: 79
DRG: 267 | End: 2019-02-12
Payer: MEDICARE

## 2019-02-12 ENCOUNTER — HOSPITAL ENCOUNTER (INPATIENT)
Age: 79
LOS: 2 days | Discharge: HOME | DRG: 267 | End: 2019-02-14
Attending: INTERNAL MEDICINE | Admitting: INTERNAL MEDICINE
Payer: MEDICARE

## 2019-02-12 PROBLEM — I35.0 AORTIC VALVE STENOSIS: Status: ACTIVE | Noted: 2019-02-12

## 2019-02-12 PROBLEM — J44.9 COPD (CHRONIC OBSTRUCTIVE PULMONARY DISEASE) (HCC): Status: ACTIVE | Noted: 2019-01-04

## 2019-02-12 PROBLEM — I35.0 AORTIC STENOSIS: Status: ACTIVE | Noted: 2019-02-12

## 2019-02-12 PROBLEM — I71.40 AAA (ABDOMINAL AORTIC ANEURYSM) WITHOUT RUPTURE: Chronic | Status: ACTIVE | Noted: 2019-02-12

## 2019-02-12 PROBLEM — M06.9 RA (RHEUMATOID ARTHRITIS) (HCC): Chronic | Status: ACTIVE | Noted: 2019-02-12

## 2019-02-12 PROBLEM — I35.0 NONRHEUMATIC AORTIC VALVE STENOSIS: Chronic | Status: RESOLVED | Noted: 2019-01-04 | Resolved: 2019-02-12

## 2019-02-12 PROBLEM — Z95.2 S/P TAVR (TRANSCATHETER AORTIC VALVE REPLACEMENT): Chronic | Status: ACTIVE | Noted: 2019-02-12

## 2019-02-12 PROBLEM — E78.00 HIGH CHOLESTEROL: Chronic | Status: ACTIVE | Noted: 2019-02-12

## 2019-02-12 LAB
BASE EXCESS BLD CALC-SCNC: 0 MMOL/L
BASE EXCESS BLD CALC-SCNC: 0 MMOL/L
CA-I BLD-MCNC: 1.18 MMOL/L (ref 1.12–1.32)
CA-I BLD-MCNC: 1.18 MMOL/L (ref 1.12–1.32)
GLUCOSE BLD STRIP.AUTO-MCNC: 108 MG/DL (ref 65–100)
GLUCOSE BLD STRIP.AUTO-MCNC: 108 MG/DL (ref 65–100)
HCO3 BLD-SCNC: 26.7 MMOL/L (ref 22–26)
HCO3 BLD-SCNC: 27.1 MMOL/L (ref 22–26)
PCO2 BLD: 48.5 MMHG (ref 35–45)
PCO2 BLD: 53.9 MMHG (ref 35–45)
PH BLD: 7.31 [PH] (ref 7.35–7.45)
PH BLD: 7.35 [PH] (ref 7.35–7.45)
PO2 BLD: 250 MMHG (ref 75–100)
PO2 BLD: 295 MMHG (ref 75–100)
POTASSIUM BLD-SCNC: 3.9 MMOL/L (ref 3.5–5.1)
POTASSIUM BLD-SCNC: 4.1 MMOL/L (ref 3.5–5.1)
SAO2 % BLD: 100 % (ref 95–98)
SAO2 % BLD: 100 % (ref 95–98)
SODIUM BLD-SCNC: 144 MMOL/L (ref 136–145)
SODIUM BLD-SCNC: 145 MMOL/L (ref 136–145)

## 2019-02-12 PROCEDURE — C1894 INTRO/SHEATH, NON-LASER: HCPCS

## 2019-02-12 PROCEDURE — B24BZZ4 ULTRASONOGRAPHY OF HEART WITH AORTA, TRANSESOPHAGEAL: ICD-10-PCS | Performed by: INTERNAL MEDICINE

## 2019-02-12 PROCEDURE — 02RF38Z REPLACEMENT OF AORTIC VALVE WITH ZOOPLASTIC TISSUE, PERCUTANEOUS APPROACH: ICD-10-PCS | Performed by: INTERNAL MEDICINE

## 2019-02-12 PROCEDURE — 77030004559 HC CATH ANGI DX SUPT CARD -B

## 2019-02-12 PROCEDURE — 74011250636 HC RX REV CODE- 250/636

## 2019-02-12 PROCEDURE — 77030002996 HC SUT SLK J&J -A: Performed by: INTERNAL MEDICINE

## 2019-02-12 PROCEDURE — 74011000250 HC RX REV CODE- 250

## 2019-02-12 PROCEDURE — C1769 GUIDE WIRE: HCPCS

## 2019-02-12 PROCEDURE — 65610000006 HC RM INTENSIVE CARE

## 2019-02-12 PROCEDURE — 76060000035 HC ANESTHESIA 2 TO 2.5 HR: Performed by: INTERNAL MEDICINE

## 2019-02-12 PROCEDURE — 77030013687 HC GD NDL BARD -B

## 2019-02-12 PROCEDURE — 77030004558 HC CATH ANGI DX SUPR TORQ CARD -A

## 2019-02-12 PROCEDURE — 74011250636 HC RX REV CODE- 250/636: Performed by: ANESTHESIOLOGY

## 2019-02-12 PROCEDURE — 77030029488 HC ELECTRD PAD DEFIB ZOLL -B

## 2019-02-12 PROCEDURE — 74011250637 HC RX REV CODE- 250/637: Performed by: INTERNAL MEDICINE

## 2019-02-12 PROCEDURE — 74011250636 HC RX REV CODE- 250/636: Performed by: INTERNAL MEDICINE

## 2019-02-12 PROCEDURE — 77030013120 HC ELECTRD PD DEFIB ZOLL -F: Performed by: INTERNAL MEDICINE

## 2019-02-12 PROCEDURE — 77030037468 HC VLV AORT EDWRD -L: Performed by: INTERNAL MEDICINE

## 2019-02-12 PROCEDURE — 5A1223Z PERFORMANCE OF CARDIAC PACING, CONTINUOUS: ICD-10-PCS | Performed by: INTERNAL MEDICINE

## 2019-02-12 PROCEDURE — 93308 TTE F-UP OR LMTD: CPT

## 2019-02-12 PROCEDURE — 94640 AIRWAY INHALATION TREATMENT: CPT

## 2019-02-12 PROCEDURE — 74011000250 HC RX REV CODE- 250: Performed by: PHYSICIAN ASSISTANT

## 2019-02-12 PROCEDURE — B3101ZZ FLUOROSCOPY OF THORACIC AORTA USING LOW OSMOLAR CONTRAST: ICD-10-PCS | Performed by: INTERNAL MEDICINE

## 2019-02-12 PROCEDURE — 77030004534 HC CATH ANGI DX INFN CARD -A

## 2019-02-12 PROCEDURE — 77030014008 HC SPNG HEMSTAT J&J -C

## 2019-02-12 PROCEDURE — C1769 GUIDE WIRE: HCPCS | Performed by: INTERNAL MEDICINE

## 2019-02-12 PROCEDURE — 82947 ASSAY GLUCOSE BLOOD QUANT: CPT

## 2019-02-12 PROCEDURE — 76010000131 HC OR TIME 2 TO 2.5 HR: Performed by: INTERNAL MEDICINE

## 2019-02-12 PROCEDURE — 82803 BLOOD GASES ANY COMBINATION: CPT

## 2019-02-12 PROCEDURE — 77030013579 HC DRSG WND CA ALG BMS -A

## 2019-02-12 PROCEDURE — C1760 CLOSURE DEV, VASC: HCPCS

## 2019-02-12 PROCEDURE — 74011636320 HC RX REV CODE- 636/320: Performed by: INTERNAL MEDICINE

## 2019-02-12 PROCEDURE — 77030013438 HC CBL PCMKR REMG -B: Performed by: INTERNAL MEDICINE

## 2019-02-12 PROCEDURE — 74011000258 HC RX REV CODE- 258

## 2019-02-12 PROCEDURE — 74011000250 HC RX REV CODE- 250: Performed by: INTERNAL MEDICINE

## 2019-02-12 PROCEDURE — 77030005318 HC CATH ELECTRD PACE TMP BARD -C

## 2019-02-12 DEVICE — VALVE AORTIC SAPEIN 3 26MM -- COMMANDER SYS 9600TFX: Type: IMPLANTABLE DEVICE | Site: AORTIC VALVE | Status: FUNCTIONAL

## 2019-02-12 RX ORDER — LIDOCAINE HYDROCHLORIDE 20 MG/ML
INJECTION, SOLUTION EPIDURAL; INFILTRATION; INTRACAUDAL; PERINEURAL AS NEEDED
Status: DISCONTINUED | OUTPATIENT
Start: 2019-02-12 | End: 2019-02-12 | Stop reason: HOSPADM

## 2019-02-12 RX ORDER — HEPARIN SODIUM 1000 [USP'U]/ML
INJECTION, SOLUTION INTRAVENOUS; SUBCUTANEOUS AS NEEDED
Status: DISCONTINUED | OUTPATIENT
Start: 2019-02-12 | End: 2019-02-12 | Stop reason: HOSPADM

## 2019-02-12 RX ORDER — MORPHINE SULFATE 2 MG/ML
2 INJECTION, SOLUTION INTRAMUSCULAR; INTRAVENOUS
Status: DISCONTINUED | OUTPATIENT
Start: 2019-02-12 | End: 2019-02-14 | Stop reason: HOSPADM

## 2019-02-12 RX ORDER — HYDROCODONE BITARTRATE AND ACETAMINOPHEN 5; 325 MG/1; MG/1
1 TABLET ORAL
Status: DISCONTINUED | OUTPATIENT
Start: 2019-02-12 | End: 2019-02-14 | Stop reason: HOSPADM

## 2019-02-12 RX ORDER — ACETAMINOPHEN 325 MG/1
650 TABLET ORAL
Status: DISCONTINUED | OUTPATIENT
Start: 2019-02-12 | End: 2019-02-14 | Stop reason: HOSPADM

## 2019-02-12 RX ORDER — PREDNISONE 5 MG/1
5 TABLET ORAL DAILY
Status: DISCONTINUED | OUTPATIENT
Start: 2019-02-13 | End: 2019-02-14 | Stop reason: HOSPADM

## 2019-02-12 RX ORDER — HYDROCODONE BITARTRATE AND ACETAMINOPHEN 7.5; 325 MG/1; MG/1
1 TABLET ORAL
Status: DISCONTINUED | OUTPATIENT
Start: 2019-02-12 | End: 2019-02-12 | Stop reason: SDUPTHER

## 2019-02-12 RX ORDER — LIDOCAINE HYDROCHLORIDE 10 MG/ML
0.1 INJECTION INFILTRATION; PERINEURAL AS NEEDED
Status: DISCONTINUED | OUTPATIENT
Start: 2019-02-12 | End: 2019-02-12 | Stop reason: HOSPADM

## 2019-02-12 RX ORDER — LATANOPROST 50 UG/ML
1 SOLUTION/ DROPS OPHTHALMIC
Status: DISCONTINUED | OUTPATIENT
Start: 2019-02-12 | End: 2019-02-14 | Stop reason: HOSPADM

## 2019-02-12 RX ORDER — CLOPIDOGREL BISULFATE 75 MG/1
300 TABLET ORAL
Status: COMPLETED | OUTPATIENT
Start: 2019-02-12 | End: 2019-02-12

## 2019-02-12 RX ORDER — MIDAZOLAM HYDROCHLORIDE 1 MG/ML
INJECTION, SOLUTION INTRAMUSCULAR; INTRAVENOUS AS NEEDED
Status: DISCONTINUED | OUTPATIENT
Start: 2019-02-12 | End: 2019-02-12 | Stop reason: HOSPADM

## 2019-02-12 RX ORDER — ALBUTEROL SULFATE 90 UG/1
2 AEROSOL, METERED RESPIRATORY (INHALATION)
Status: DISCONTINUED | OUTPATIENT
Start: 2019-02-12 | End: 2019-02-12 | Stop reason: CLARIF

## 2019-02-12 RX ORDER — NITROGLYCERIN 0.4 MG/1
0.4 TABLET SUBLINGUAL
Status: DISCONTINUED | OUTPATIENT
Start: 2019-02-12 | End: 2019-02-12 | Stop reason: SDUPTHER

## 2019-02-12 RX ORDER — SODIUM CHLORIDE 0.9 % (FLUSH) 0.9 %
5-40 SYRINGE (ML) INJECTION AS NEEDED
Status: DISCONTINUED | OUTPATIENT
Start: 2019-02-12 | End: 2019-02-13

## 2019-02-12 RX ORDER — ACETAMINOPHEN 325 MG/1
650 TABLET ORAL
Status: DISCONTINUED | OUTPATIENT
Start: 2019-02-12 | End: 2019-02-12 | Stop reason: SDUPTHER

## 2019-02-12 RX ORDER — CEFPODOXIME PROXETIL 200 MG/1
100 TABLET, FILM COATED ORAL EVERY 12 HOURS
Status: DISCONTINUED | OUTPATIENT
Start: 2019-02-12 | End: 2019-02-12 | Stop reason: SDUPTHER

## 2019-02-12 RX ORDER — SODIUM CHLORIDE 0.9 % (FLUSH) 0.9 %
5-40 SYRINGE (ML) INJECTION EVERY 8 HOURS
Status: DISCONTINUED | OUTPATIENT
Start: 2019-02-12 | End: 2019-02-12

## 2019-02-12 RX ORDER — PROTAMINE SULFATE 10 MG/ML
INJECTION, SOLUTION INTRAVENOUS AS NEEDED
Status: DISCONTINUED | OUTPATIENT
Start: 2019-02-12 | End: 2019-02-12 | Stop reason: HOSPADM

## 2019-02-12 RX ORDER — CLOPIDOGREL BISULFATE 75 MG/1
75 TABLET ORAL DAILY
Status: DISCONTINUED | OUTPATIENT
Start: 2019-02-13 | End: 2019-02-14 | Stop reason: HOSPADM

## 2019-02-12 RX ORDER — NALOXONE HYDROCHLORIDE 0.4 MG/ML
0.2 INJECTION, SOLUTION INTRAMUSCULAR; INTRAVENOUS; SUBCUTANEOUS AS NEEDED
Status: DISCONTINUED | OUTPATIENT
Start: 2019-02-12 | End: 2019-02-13

## 2019-02-12 RX ORDER — HEPARIN SODIUM 5000 [USP'U]/ML
INJECTION, SOLUTION INTRAVENOUS; SUBCUTANEOUS AS NEEDED
Status: DISCONTINUED | OUTPATIENT
Start: 2019-02-12 | End: 2019-02-12 | Stop reason: HOSPADM

## 2019-02-12 RX ORDER — SODIUM CHLORIDE 9 MG/ML
75 INJECTION, SOLUTION INTRAVENOUS CONTINUOUS
Status: DISPENSED | OUTPATIENT
Start: 2019-02-12 | End: 2019-02-13

## 2019-02-12 RX ORDER — CEFAZOLIN SODIUM/WATER 2 G/20 ML
2 SYRINGE (ML) INTRAVENOUS EVERY 8 HOURS
Status: DISCONTINUED | OUTPATIENT
Start: 2019-02-12 | End: 2019-02-14 | Stop reason: HOSPADM

## 2019-02-12 RX ORDER — SODIUM CHLORIDE, SODIUM LACTATE, POTASSIUM CHLORIDE, CALCIUM CHLORIDE 600; 310; 30; 20 MG/100ML; MG/100ML; MG/100ML; MG/100ML
75 INJECTION, SOLUTION INTRAVENOUS CONTINUOUS
Status: DISCONTINUED | OUTPATIENT
Start: 2019-02-12 | End: 2019-02-12 | Stop reason: HOSPADM

## 2019-02-12 RX ORDER — FENTANYL CITRATE 50 UG/ML
INJECTION, SOLUTION INTRAMUSCULAR; INTRAVENOUS AS NEEDED
Status: DISCONTINUED | OUTPATIENT
Start: 2019-02-12 | End: 2019-02-12 | Stop reason: HOSPADM

## 2019-02-12 RX ORDER — ASPIRIN 81 MG/1
81 TABLET ORAL DAILY
Status: DISCONTINUED | OUTPATIENT
Start: 2019-02-13 | End: 2019-02-12 | Stop reason: SDUPTHER

## 2019-02-12 RX ORDER — ALBUTEROL SULFATE 0.83 MG/ML
1.25 SOLUTION RESPIRATORY (INHALATION)
Status: DISCONTINUED | OUTPATIENT
Start: 2019-02-12 | End: 2019-02-14 | Stop reason: HOSPADM

## 2019-02-12 RX ORDER — ONDANSETRON 2 MG/ML
4 INJECTION INTRAMUSCULAR; INTRAVENOUS
Status: DISCONTINUED | OUTPATIENT
Start: 2019-02-12 | End: 2019-02-14 | Stop reason: HOSPADM

## 2019-02-12 RX ORDER — ALBUTEROL SULFATE 0.83 MG/ML
2.5 SOLUTION RESPIRATORY (INHALATION)
Status: DISCONTINUED | OUTPATIENT
Start: 2019-02-12 | End: 2019-02-14 | Stop reason: HOSPADM

## 2019-02-12 RX ORDER — SODIUM CHLORIDE, SODIUM LACTATE, POTASSIUM CHLORIDE, CALCIUM CHLORIDE 600; 310; 30; 20 MG/100ML; MG/100ML; MG/100ML; MG/100ML
75 INJECTION, SOLUTION INTRAVENOUS CONTINUOUS
Status: DISCONTINUED | OUTPATIENT
Start: 2019-02-12 | End: 2019-02-12

## 2019-02-12 RX ORDER — HYDROMORPHONE HYDROCHLORIDE 1 MG/ML
0.5 INJECTION, SOLUTION INTRAMUSCULAR; INTRAVENOUS; SUBCUTANEOUS
Status: DISCONTINUED | OUTPATIENT
Start: 2019-02-12 | End: 2019-02-12 | Stop reason: SDUPTHER

## 2019-02-12 RX ORDER — LORAZEPAM 1 MG/1
1 TABLET ORAL
Status: DISCONTINUED | OUTPATIENT
Start: 2019-02-12 | End: 2019-02-14 | Stop reason: HOSPADM

## 2019-02-12 RX ORDER — MIDAZOLAM HYDROCHLORIDE 1 MG/ML
1 INJECTION, SOLUTION INTRAMUSCULAR; INTRAVENOUS
Status: DISCONTINUED | OUTPATIENT
Start: 2019-02-12 | End: 2019-02-12 | Stop reason: HOSPADM

## 2019-02-12 RX ORDER — SODIUM CHLORIDE 9 MG/ML
INJECTION, SOLUTION INTRAVENOUS
Status: DISCONTINUED | OUTPATIENT
Start: 2019-02-12 | End: 2019-02-12 | Stop reason: HOSPADM

## 2019-02-12 RX ORDER — OXYCODONE AND ACETAMINOPHEN 5; 325 MG/1; MG/1
1 TABLET ORAL AS NEEDED
Status: DISCONTINUED | OUTPATIENT
Start: 2019-02-12 | End: 2019-02-13

## 2019-02-12 RX ORDER — BUDESONIDE 0.5 MG/2ML
500 INHALANT ORAL
Status: DISCONTINUED | OUTPATIENT
Start: 2019-02-12 | End: 2019-02-14 | Stop reason: HOSPADM

## 2019-02-12 RX ORDER — BRIMONIDINE TARTRATE 2 MG/ML
1 SOLUTION/ DROPS OPHTHALMIC
Status: DISCONTINUED | OUTPATIENT
Start: 2019-02-12 | End: 2019-02-14 | Stop reason: HOSPADM

## 2019-02-12 RX ORDER — SODIUM CHLORIDE 0.9 % (FLUSH) 0.9 %
5-40 SYRINGE (ML) INJECTION EVERY 8 HOURS
Status: DISCONTINUED | OUTPATIENT
Start: 2019-02-12 | End: 2019-02-14 | Stop reason: HOSPADM

## 2019-02-12 RX ORDER — SODIUM CHLORIDE 9 MG/ML
75 INJECTION, SOLUTION INTRAVENOUS CONTINUOUS
Status: DISCONTINUED | OUTPATIENT
Start: 2019-02-12 | End: 2019-02-13

## 2019-02-12 RX ORDER — TAMSULOSIN HYDROCHLORIDE 0.4 MG/1
0.4 CAPSULE ORAL DAILY
Status: DISCONTINUED | OUTPATIENT
Start: 2019-02-13 | End: 2019-02-14 | Stop reason: HOSPADM

## 2019-02-12 RX ORDER — NITROGLYCERIN 0.4 MG/1
0.4 TABLET SUBLINGUAL
Status: DISCONTINUED | OUTPATIENT
Start: 2019-02-12 | End: 2019-02-14 | Stop reason: HOSPADM

## 2019-02-12 RX ORDER — GUAIFENESIN 100 MG/5ML
81 LIQUID (ML) ORAL DAILY
Status: DISCONTINUED | OUTPATIENT
Start: 2019-02-13 | End: 2019-02-14 | Stop reason: HOSPADM

## 2019-02-12 RX ORDER — SODIUM CHLORIDE 0.9 % (FLUSH) 0.9 %
5-40 SYRINGE (ML) INJECTION AS NEEDED
Status: DISCONTINUED | OUTPATIENT
Start: 2019-02-12 | End: 2019-02-14 | Stop reason: HOSPADM

## 2019-02-12 RX ADMIN — HYDROCODONE BITARTRATE AND ACETAMINOPHEN 1 TABLET: 5; 325 TABLET ORAL at 21:52

## 2019-02-12 RX ADMIN — LORAZEPAM 1 MG: 1 TABLET ORAL at 21:52

## 2019-02-12 RX ADMIN — CLOPIDOGREL BISULFATE 300 MG: 75 TABLET ORAL at 17:35

## 2019-02-12 RX ADMIN — HEPARIN SODIUM 16000 UNITS: 1000 INJECTION, SOLUTION INTRAVENOUS; SUBCUTANEOUS at 13:39

## 2019-02-12 RX ADMIN — LATANOPROST 1 DROP: 50 SOLUTION OPHTHALMIC at 21:13

## 2019-02-12 RX ADMIN — FENTANYL CITRATE 25 MCG: 50 INJECTION, SOLUTION INTRAMUSCULAR; INTRAVENOUS at 13:06

## 2019-02-12 RX ADMIN — BRIMONIDINE TARTRATE 1 DROP: 2 SOLUTION/ DROPS OPHTHALMIC at 21:13

## 2019-02-12 RX ADMIN — Medication 2 G: at 21:13

## 2019-02-12 RX ADMIN — SODIUM CHLORIDE 75 ML/HR: 900 INJECTION, SOLUTION INTRAVENOUS at 15:22

## 2019-02-12 RX ADMIN — Medication 10 ML: at 17:36

## 2019-02-12 RX ADMIN — SODIUM CHLORIDE 75 ML/HR: 900 INJECTION, SOLUTION INTRAVENOUS at 17:36

## 2019-02-12 RX ADMIN — Medication 10 ML: at 21:13

## 2019-02-12 RX ADMIN — BUDESONIDE 500 MCG: 0.5 INHALANT RESPIRATORY (INHALATION) at 21:57

## 2019-02-12 RX ADMIN — SODIUM CHLORIDE, SODIUM LACTATE, POTASSIUM CHLORIDE, AND CALCIUM CHLORIDE 75 ML/HR: 600; 310; 30; 20 INJECTION, SOLUTION INTRAVENOUS at 09:39

## 2019-02-12 RX ADMIN — SODIUM CHLORIDE: 9 INJECTION, SOLUTION INTRAVENOUS at 12:28

## 2019-02-12 RX ADMIN — MIDAZOLAM HYDROCHLORIDE 2 MG: 1 INJECTION, SOLUTION INTRAMUSCULAR; INTRAVENOUS at 12:23

## 2019-02-12 RX ADMIN — PROTAMINE SULFATE 50 MG: 10 INJECTION, SOLUTION INTRAVENOUS at 14:11

## 2019-02-12 RX ADMIN — VANCOMYCIN HYDROCHLORIDE 1500 MG: 10 INJECTION, POWDER, LYOPHILIZED, FOR SOLUTION INTRAVENOUS at 09:35

## 2019-02-12 RX ADMIN — FENTANYL CITRATE 25 MCG: 50 INJECTION, SOLUTION INTRAMUSCULAR; INTRAVENOUS at 13:25

## 2019-02-12 RX ADMIN — ALBUTEROL SULFATE 2.5 MG: 2.5 SOLUTION RESPIRATORY (INHALATION) at 21:57

## 2019-02-12 RX ADMIN — Medication 2 G: at 13:02

## 2019-02-12 RX ADMIN — LIDOCAINE HYDROCHLORIDE 40 MG: 20 INJECTION, SOLUTION EPIDURAL; INFILTRATION; INTRACAUDAL; PERINEURAL at 13:53

## 2019-02-12 NOTE — PROGRESS NOTES
Pacer at times with capture, put noted pt in A fib with wide complex rate up to 158, art line 75/35. [place] Side notified and to bedside. Dr's Elmore Simmonds and Zach Preston. STAT pads in place and to Jacey Jaquez. Pt responds and denies any c/o.

## 2019-02-12 NOTE — OP NOTES
Operative Report    Patient: Powell Saint Albans MRN: [id number]  SSN: [id number]    YOB: 1940  Age: 69 y.o. Sex: male       Date of Surgery: 2/12/2019     Preoperative Diagnosis: Aortic valve stenosis, etiology of cardiac valve disease unspecified [I35.0]     Postoperative Diagnosis: Aortic valve stenosis, etiology of cardiac valve disease unspecified [I35.0]     Primary Cardiothoracic Surgeon: Gutierrez Ballesteros MD     Primary Interventional Cardiologist: Ernesto Taylor MD    Anesthesia: MAC     Procedure: Procedure(s):  TRANSCATHETER AORTIC VALVE REPLACEMENT   Transcatheter aortic valve replacement (26 mm Thompson Mitchell 3 transcatheter aortic valve via right common femoral artery percutaneous approach). Findings:  Successful deployment of a 26 mm Thompson Mitchell 3 transcatheter aortic valve via right common femoral artery percutaneous approach. normal LV function with trace perivalvular regurgitation. The patient was in atrial fibrillation at the conclusion of the procedure. Indication for Procedure: The patient is a 69 y.o. male with symptomatic severe aortic stenosis. He was not felt to be a candidate for surgical aortic valve replacement and after discussion at the multidisciplinary valve board transcatheter aortic valve replacement was recommended. After a thorough discussion of all the risks and proposed benefits, the patient agreed to proceed. Procedure in Detail:   Patient premedicated and brought to the operating suite. Time-out performed. Standard monitoring lines placed, and the patient was intubated and placed under general anesthesia without event. Transesophageal echocardiogram was performed, confirming the above valve pathology. Intravenous cefazolin was administered. Patient prepped and draped in standard, meticulous surgical fashion.  Ioban drapes were used.     Access to the left common femoral artery and vein was obtained under ultrasound guidance, and 6-Qatari sheaths were placed. A temporary transvenous right ventricular pacing wire was positioned via the femoral vein. Next, a micropuncture kit was used to gain access to the right common femoral artery and a 6-Andorran sheath was placed. This was upsized and the 14-Andorran Thompson transcatheter heart valve sheath was placed after deploying Perclose devices in a pre-close fashion.      Next, a balloon valvuloplasty was performed. The patient remained hemodynamically stable. The 26mm Mitchell 3 valve was then prepped and mounted onto the delivery system in the correct orientation. This was then positioned in the ascending aorta, and then advanced across the aortic valve and deployed successfully. The details of this are dictated under a separate cover in Dr. Peng Reed report. Following deployment, an aortogram and transesophageal echocardiogram were performed, which demonstrated a trace aortic regurgitation and a well-seated, well-functioning prosthetic valve in the aortic position. The patient remained hemodynamically stable and in atrial fibrillation. At this point, the delivery system and the sheath were removed from the left common femoral artery, and hemostasis was obtained using the Perclose sutures. Sheaths were also removed from the right common femoral artery and vein. At the conclusion of the procedure, the patient was hemodynamically stable and hemostasis was good. The patient was then transported to the CV ICU in critical but stable condition. At the end of the case, all sharp, sponge, and instrument counts were reported as being correct. Estimated Blood Loss:  minimal    Tourniquet Time: * No tourniquets in log *      Implants:   Implant Name Type Inv.  Item Serial No.  Lot No. LRB No. Used Action   VALVE AORTIC SAPEIN 3 26MM -- COMMANDER SYS [id number] - [id number]  VALVE AORTIC SAPEIN 3 26MM -- COMMANDER SYS [id number] [employer]  N/A 1 Implanted               Specimens: * No specimens in log * Drains: None                Complications: None    Counts: Sponge and needle counts were correct times two.     Signed By:  Jeovanny Bernstein MD     February 12, 2019

## 2019-02-12 NOTE — ANESTHESIA POSTPROCEDURE EVALUATION
Procedure(s): 
TRANSCATHETER AORTIC VALVE REPLACEMENT. Anesthesia Post Evaluation Multimodal analgesia: multimodal analgesia used between 6 hours prior to anesthesia start to PACU discharge Patient location during evaluation: PACU Patient participation: complete - patient participated Level of consciousness: awake and alert Pain management: adequate Airway patency: patent Anesthetic complications: no 
Cardiovascular status: acceptable Respiratory status: acceptable Hydration status: acceptable Post anesthesia nausea and vomiting:  none Visit Vitals /80 Pulse 80 Temp 36.7 °C (98 °F) Resp 22 Ht 5' 10\" (1.778 m) Wt 84.6 kg (186 lb 7 oz) SpO2 97% BMI 26.75 kg/m²

## 2019-02-12 NOTE — PROGRESS NOTES
Pt transferred to CVICU following procedure. This CM reviewed pt chart. No identified needs at this time. CM to continue to follow.

## 2019-02-12 NOTE — PROGRESS NOTES
Large ooze to R IJ transvenous pacer Dr. José Martin aware 4x4 gauze and light pressure applied to site Will continue to monitor closely

## 2019-02-12 NOTE — PROGRESS NOTES
TRANSFER - IN REPORT: 
 
Verbal report received from Michael Campbell CRNA(name) on Brunilda Tsai  being received from OR(unit) for routine post - op Report consisted of patients Situation, Background, Assessment and  
Recommendations(SBAR). Information from the following report(s) SBAR, Kardex, OR Summary, Procedure Summary, Intake/Output, MAR, Recent Results, Med Rec Status and Cardiac Rhythm paced was reviewed with the receiving nurse. Opportunity for questions and clarification was provided. Assessment completed upon patients arrival to unit and care assumed.

## 2019-02-12 NOTE — PROGRESS NOTES
Dr. Edwina Deng and Thai Maher, RN at bedside Pt turned to left and right side log roll, pt having occasional runs of AFib, pacer not capturing at times Dressing changed to transvenous temporary pacemaker using 4x4 gauze, surgicel, transparent film Will continue to monitor

## 2019-02-12 NOTE — BRIEF OP NOTE
BRIEF OPERATIVE NOTE Date of Procedure: 2/12/2019 Preoperative Diagnosis: Aortic valve stenosis, etiology of cardiac valve disease unspecified [I35.0] Postoperative Diagnosis: Aortic valve stenosis, etiology of cardiac valve disease unspecified [I35.0] Procedure(s): 
TRANSCATHETER AORTIC VALVE REPLACEMENT Surgeon(s) and Role: Ivon Dunn MD - Primary Kaylene Shepard MD - Assisting * Orquidea Borjas MD - Assisting Surgical Assistant:  
 
Surgical Staff: 
Circ-1: Georgina Young Perfusionist: Nithya Jones Scrub Tech-1: Kirstin Roth Scrub Tech-2: Vidya Lloyd Radiology Technologist: Radha Diehl Event Time In Time Out Incision Start [phone] Incision Close Anesthesia: MAC Estimated Blood Loss: minimal 
Specimens: * No specimens in log * Findings: as  
Complications: none Implants:  
Implant Name Type Inv. Item Serial No.  Lot No. LRB No. Used Action VALVE AORTIC SAPEIN 3 26MM -- COMMANDER SYS [id number] - [id number]  VALVE AORTIC SAPEIN 3 26MM -- COMMANDER SYS [id number] [employer]  N/A 1 Implanted

## 2019-02-12 NOTE — OP NOTES
PROCEDURE/OPERATIVE REPORT    Patient: Darby Arreaga MRN: [id number]  SSN: [id number]    YOB: 1940  Age: 69 y.o. Sex: male      DATE OF PROCEDURE: 2/12/2019     PROCEDURE: TRANSCATHETER AORTIC VALVE REPLACEMENT  VIA THE RIGHT FEMORAL ARTERY    INDICATION:   The patient is a 69 y.o. y.o.@ with severe symptomatic aortic stenosis. After a thorough preoperative evaluation by multidisciplinary valve board, the patient was felt to be elevated risk for surgical AVR. Based on this elevated risk, it was recommended patient proceed with transcatheter aortic valve replacement. This was discussed with patient and after a thorough discussion of all the risks and proposed benefits, the patient agreed to proceed. PRIMARY INTERVENTIONAL CARDIOLOGIST: General Goodpasture, MD     PRIMARY CARDIOTHORACIC SURGEON:  Megan Montemayor. Rivka Verma MD      ASSISTING INTERVENTIONAL CARDIOLOGIST: Gladys Colon MD    ANESTHESIOLOGIST: Devonte Caraballo MD     TYPE OF ANESTHESIA:  MODERATE SEDATION    PROCEDURAL DETAILS: Patient was brought to the hybrid operating suite. Time-out performed. Standard monitoring lines placed. Patient underwent moderate sedation by Anesthesiology with continuous monitoring of hemodynamics and oxygenation. Intravenous antibiotics were administered. Patient was prepped and draped in standard, meticulous surgical fashion. Utilizing a Site-rite Ultrasound, the left common femoral artery and vein were identified. A static image was placed on the chart. Under direct ultrasound guidance, a 6-Ugandan sheath was placed in the left common femoral artery and vein. At this point, a 6-Ugandan LIMA catheter was advanced and utilized to selectively engage the ostium of the right common iliac artery. Angiography was performed in the AP projection with visualization of the common iliac, external iliac and femoral vessels. Anatomy was suitable for large bore arterial assess.   Under direct visualization, the right common femoral artery was entered with a micropuncture needle. This was then exchanged via a micropuncture catheter for a 6-Thai sheath.     At this point, a transvenous balloon-tipped pacemaker was advanced via with right internal jugular venous sheath and placed in the RV apex under fluoroscopic guidance. Pacing was initiated and capture was ensured at 0.5 milliamps. Pacing was suspended and pacemaker was set for maximum output for the remainder of the procedure.      Next a Coplanar view was established. A pigtail catheter was advanced via the left femoral artery into the right coronary cusp. Angiography was performed, with establishment of a coplanar angle at 3 MELISSA  and 12 Caudal .     The 6-Thai right common femoral sheath was then removed and the arteriotomy site was \"pre-closed\" utilizing 2 Perclose devices deployed in a 3 o'clock, 10 o'clock position. Intravenous heparin was administered and ACT was monitored with intermittent heparin administration to maintain a ACT greater than 250. After heparin was given, a 14 Western Gemma Thompson sheath was successfully placed over a Lunderquist wire under continuous fluoroscopic guidance.      A 5 Malay Amplatz AL1 catheter was then advanced up into the aortic root via the  Thompson sheath, and a straight-tip Cordis wire was used to cross the stenotic aortic valve. The Amplatz catheter was placed in the left ventricle, and this was then exchanged for a Amplatz Extra Stiff 0.35 wire with preformed distal curve.      A 26 mm Thompson Mitchell S3 valve was then prepped. This was advanced into the descending aorta. The deployment balloon was pulled back within the valve via standard fashion. The valve was then advanced around the aortic arch and across the native aortic valve under fluoroscopy guidance. The delivery sheath was pulled back in standard fashion. Angiography was performed and confirmed that the valve was in the appropriate position.  At this point, the patient was paced at 180 beats a minute, with appropriate hemodynamic collapse. The valve was deployed in standard fashion. Following valve deployment, a aortogram was performed showing patent coronary arteries and trivial aortic regurgitation. Transthoracic echocardiogram was performed that showed good position of the aortic valve prosthesis with  trivial paravalvular regurgitation. Based on the finding above, it was felt that the valve was deployed successfully.       At this point, the Thompson sheath was removed, and the Perclose sutures were deployed via standard fashion with good hemostasis. The patient was given IV Protamine and manual pressure was applied for 15 minutes.       The left common femoral artery was then imaged. The sheath was contained in the left common femoral artery. A Angioseal vascular closure device was deployed with good hemostasis. The pacemaker was  left in place due to heart block.     CONCLUSION: Successful transcatheter aortic valve replacement with a 26 mm Thompson Mitchell S3 valve via a right femoral aproach.      NOTE:  Iban Leon. Phuong España MD was present and participated throughout the procedure.     Maren Vogt MD

## 2019-02-12 NOTE — PROGRESS NOTES
Dr Talya Harris to bedside for uncontrolled a fib and loss of pacer capture. Pt will still respond and no distress noted. Within 1min, pt /55 with HR 64 intrinsic

## 2019-02-13 ENCOUNTER — APPOINTMENT (OUTPATIENT)
Dept: GENERAL RADIOLOGY | Age: 79
DRG: 267 | End: 2019-02-13
Attending: INTERNAL MEDICINE
Payer: MEDICARE

## 2019-02-13 PROBLEM — I44.2 COMPLETE HEART BLOCK (HCC): Status: ACTIVE | Noted: 2019-02-13

## 2019-02-13 LAB
ANION GAP SERPL CALC-SCNC: 11 MMOL/L (ref 7–16)
APTT PPP: 28.2 SEC (ref 24.7–39.8)
ATRIAL RATE: 106 BPM
ATRIAL RATE: 82 BPM
BACTERIA SPEC CULT: NORMAL
BASOPHILS # BLD: 0 K/UL (ref 0–0.2)
BASOPHILS NFR BLD: 0 % (ref 0–2)
BUN SERPL-MCNC: 24 MG/DL (ref 8–23)
CALCIUM SERPL-MCNC: 7.7 MG/DL (ref 8.3–10.4)
CALCULATED R AXIS, ECG10: -71 DEGREES
CALCULATED R AXIS, ECG10: -91 DEGREES
CALCULATED T AXIS, ECG11: 110 DEGREES
CALCULATED T AXIS, ECG11: 84 DEGREES
CHLORIDE SERPL-SCNC: 110 MMOL/L (ref 98–107)
CO2 SERPL-SCNC: 19 MMOL/L (ref 21–32)
CREAT SERPL-MCNC: 1.81 MG/DL (ref 0.8–1.5)
DIAGNOSIS, 93000: NORMAL
DIAGNOSIS, 93000: NORMAL
DIFFERENTIAL METHOD BLD: ABNORMAL
EOSINOPHIL # BLD: 0 K/UL (ref 0–0.8)
EOSINOPHIL NFR BLD: 0 % (ref 0.5–7.8)
ERYTHROCYTE [DISTWIDTH] IN BLOOD BY AUTOMATED COUNT: 13.3 % (ref 11.9–14.6)
GLUCOSE SERPL-MCNC: 131 MG/DL (ref 65–100)
HCT VFR BLD AUTO: 44.7 % (ref 41.1–50.3)
HGB BLD-MCNC: 14.1 G/DL (ref 13.6–17.2)
IMM GRANULOCYTES # BLD AUTO: 0 K/UL (ref 0–0.5)
IMM GRANULOCYTES NFR BLD AUTO: 0 % (ref 0–5)
INR PPP: 1.1
LYMPHOCYTES # BLD: 1.2 K/UL (ref 0.5–4.6)
LYMPHOCYTES NFR BLD: 9 % (ref 13–44)
MCH RBC QN AUTO: 30.9 PG (ref 26.1–32.9)
MCHC RBC AUTO-ENTMCNC: 31.5 G/DL (ref 31.4–35)
MCV RBC AUTO: 97.8 FL (ref 79.6–97.8)
MONOCYTES # BLD: 1.7 K/UL (ref 0.1–1.3)
MONOCYTES NFR BLD: 12 % (ref 4–12)
NEUTS SEG # BLD: 10.8 K/UL (ref 1.7–8.2)
NEUTS SEG NFR BLD: 79 % (ref 43–78)
NRBC # BLD: 0 K/UL (ref 0–0.2)
PLATELET # BLD AUTO: 136 K/UL (ref 150–450)
PMV BLD AUTO: 11.3 FL (ref 9.4–12.3)
POTASSIUM SERPL-SCNC: 4.3 MMOL/L (ref 3.5–5.1)
PROTHROMBIN TIME: 14.4 SEC (ref 11.7–14.5)
Q-T INTERVAL, ECG07: 428 MS
Q-T INTERVAL, ECG07: 522 MS
QRS DURATION, ECG06: 172 MS
QRS DURATION, ECG06: 178 MS
QTC CALCULATION (BEZET), ECG08: 522 MS
QTC CALCULATION (BEZET), ECG08: 568 MS
RBC # BLD AUTO: 4.57 M/UL (ref 4.23–5.6)
SERVICE CMNT-IMP: NORMAL
SODIUM SERPL-SCNC: 140 MMOL/L (ref 136–145)
VENTRICULAR RATE, ECG03: 106 BPM
VENTRICULAR RATE, ECG03: 60 BPM
WBC # BLD AUTO: 13.7 K/UL (ref 4.3–11.1)

## 2019-02-13 PROCEDURE — 99153 MOD SED SAME PHYS/QHP EA: CPT

## 2019-02-13 PROCEDURE — 74011636320 HC RX REV CODE- 636/320: Performed by: INTERNAL MEDICINE

## 2019-02-13 PROCEDURE — 74011250636 HC RX REV CODE- 250/636

## 2019-02-13 PROCEDURE — 80048 BASIC METABOLIC PNL TOTAL CA: CPT

## 2019-02-13 PROCEDURE — 77010033678 HC OXYGEN DAILY

## 2019-02-13 PROCEDURE — 74011250637 HC RX REV CODE- 250/637: Performed by: INTERNAL MEDICINE

## 2019-02-13 PROCEDURE — 74011250637 HC RX REV CODE- 250/637: Performed by: PHYSICIAN ASSISTANT

## 2019-02-13 PROCEDURE — 93005 ELECTROCARDIOGRAM TRACING: CPT | Performed by: INTERNAL MEDICINE

## 2019-02-13 PROCEDURE — 74011000250 HC RX REV CODE- 250: Performed by: INTERNAL MEDICINE

## 2019-02-13 PROCEDURE — 74011250636 HC RX REV CODE- 250/636: Performed by: INTERNAL MEDICINE

## 2019-02-13 PROCEDURE — 74011250636 HC RX REV CODE- 250/636: Performed by: PHYSICIAN ASSISTANT

## 2019-02-13 PROCEDURE — 65610000006 HC RM INTENSIVE CARE

## 2019-02-13 PROCEDURE — 94760 N-INVAS EAR/PLS OXIMETRY 1: CPT

## 2019-02-13 PROCEDURE — 71045 X-RAY EXAM CHEST 1 VIEW: CPT

## 2019-02-13 PROCEDURE — 99152 MOD SED SAME PHYS/QHP 5/>YRS: CPT

## 2019-02-13 PROCEDURE — 02HK3JZ INSERTION OF PACEMAKER LEAD INTO RIGHT VENTRICLE, PERCUTANEOUS APPROACH: ICD-10-PCS | Performed by: INTERNAL MEDICINE

## 2019-02-13 PROCEDURE — 33208 INSRT HEART PM ATRIAL & VENT: CPT

## 2019-02-13 PROCEDURE — 74011250637 HC RX REV CODE- 250/637: Performed by: ANESTHESIOLOGY

## 2019-02-13 PROCEDURE — 74011636637 HC RX REV CODE- 636/637: Performed by: PHYSICIAN ASSISTANT

## 2019-02-13 PROCEDURE — 85610 PROTHROMBIN TIME: CPT

## 2019-02-13 PROCEDURE — 02H63JZ INSERTION OF PACEMAKER LEAD INTO RIGHT ATRIUM, PERCUTANEOUS APPROACH: ICD-10-PCS | Performed by: INTERNAL MEDICINE

## 2019-02-13 PROCEDURE — 0JH606Z INSERTION OF PACEMAKER, DUAL CHAMBER INTO CHEST SUBCUTANEOUS TISSUE AND FASCIA, OPEN APPROACH: ICD-10-PCS | Performed by: INTERNAL MEDICINE

## 2019-02-13 PROCEDURE — 36600 WITHDRAWAL OF ARTERIAL BLOOD: CPT

## 2019-02-13 PROCEDURE — C8929 TTE W OR WO FOL WCON,DOPPLER: HCPCS

## 2019-02-13 PROCEDURE — 85025 COMPLETE CBC W/AUTO DIFF WBC: CPT

## 2019-02-13 PROCEDURE — 94640 AIRWAY INHALATION TREATMENT: CPT

## 2019-02-13 PROCEDURE — 85730 THROMBOPLASTIN TIME PARTIAL: CPT

## 2019-02-13 RX ORDER — SODIUM CHLORIDE 0.9 % (FLUSH) 0.9 %
5-40 SYRINGE (ML) INJECTION AS NEEDED
Status: DISCONTINUED | OUTPATIENT
Start: 2019-02-13 | End: 2019-02-14 | Stop reason: HOSPADM

## 2019-02-13 RX ORDER — SODIUM CHLORIDE 9 MG/ML
50 INJECTION, SOLUTION INTRAVENOUS CONTINUOUS
Status: DISPENSED | OUTPATIENT
Start: 2019-02-13 | End: 2019-02-13

## 2019-02-13 RX ORDER — SODIUM CHLORIDE 0.9 % (FLUSH) 0.9 %
5-40 SYRINGE (ML) INJECTION EVERY 8 HOURS
Status: DISCONTINUED | OUTPATIENT
Start: 2019-02-13 | End: 2019-02-13

## 2019-02-13 RX ORDER — ONDANSETRON 2 MG/ML
4 INJECTION INTRAMUSCULAR; INTRAVENOUS
Status: ACTIVE | OUTPATIENT
Start: 2019-02-13 | End: 2019-02-14

## 2019-02-13 RX ORDER — CEFAZOLIN SODIUM/WATER 2 G/20 ML
2 SYRINGE (ML) INTRAVENOUS EVERY 8 HOURS
Status: DISCONTINUED | OUTPATIENT
Start: 2019-02-13 | End: 2019-02-13 | Stop reason: SDUPTHER

## 2019-02-13 RX ORDER — HYDRALAZINE HYDROCHLORIDE 20 MG/ML
10 INJECTION INTRAMUSCULAR; INTRAVENOUS ONCE
Status: COMPLETED | OUTPATIENT
Start: 2019-02-13 | End: 2019-02-13

## 2019-02-13 RX ORDER — AMLODIPINE BESYLATE 5 MG/1
5 TABLET ORAL DAILY
Status: DISCONTINUED | OUTPATIENT
Start: 2019-02-13 | End: 2019-02-14 | Stop reason: HOSPADM

## 2019-02-13 RX ORDER — CEFAZOLIN SODIUM IN 0.9 % NACL 2 G/50 ML
2 INTRAVENOUS SOLUTION, PIGGYBACK (ML) INTRAVENOUS ONCE
Status: DISCONTINUED | OUTPATIENT
Start: 2019-02-13 | End: 2019-02-13 | Stop reason: SDUPTHER

## 2019-02-13 RX ORDER — FENTANYL CITRATE 50 UG/ML
25-100 INJECTION, SOLUTION INTRAMUSCULAR; INTRAVENOUS
Status: DISCONTINUED | OUTPATIENT
Start: 2019-02-13 | End: 2019-02-14 | Stop reason: HOSPADM

## 2019-02-13 RX ORDER — MIDAZOLAM HYDROCHLORIDE 1 MG/ML
.5-5 INJECTION, SOLUTION INTRAMUSCULAR; INTRAVENOUS
Status: DISCONTINUED | OUTPATIENT
Start: 2019-02-13 | End: 2019-02-14 | Stop reason: HOSPADM

## 2019-02-13 RX ADMIN — CLOPIDOGREL 75 MG: 75 TABLET, FILM COATED ORAL at 09:21

## 2019-02-13 RX ADMIN — ONDANSETRON 4 MG: 2 INJECTION INTRAMUSCULAR; INTRAVENOUS at 00:18

## 2019-02-13 RX ADMIN — ALBUTEROL SULFATE 2.5 MG: 2.5 SOLUTION RESPIRATORY (INHALATION) at 12:22

## 2019-02-13 RX ADMIN — BRIMONIDINE TARTRATE 1 DROP: 2 SOLUTION/ DROPS OPHTHALMIC at 21:34

## 2019-02-13 RX ADMIN — HYDROCODONE BITARTRATE AND ACETAMINOPHEN 1 TABLET: 5; 325 TABLET ORAL at 02:04

## 2019-02-13 RX ADMIN — Medication 2 G: at 21:33

## 2019-02-13 RX ADMIN — OXYCODONE AND ACETAMINOPHEN 1 TABLET: 5; 325 TABLET ORAL at 00:12

## 2019-02-13 RX ADMIN — MORPHINE SULFATE 2 MG: 2 INJECTION, SOLUTION INTRAMUSCULAR; INTRAVENOUS at 03:15

## 2019-02-13 RX ADMIN — SODIUM CHLORIDE 75 ML/HR: 900 INJECTION, SOLUTION INTRAVENOUS at 02:52

## 2019-02-13 RX ADMIN — ALBUTEROL SULFATE 2.5 MG: 2.5 SOLUTION RESPIRATORY (INHALATION) at 08:45

## 2019-02-13 RX ADMIN — TAMSULOSIN HYDROCHLORIDE 0.4 MG: 0.4 CAPSULE ORAL at 09:21

## 2019-02-13 RX ADMIN — PREDNISONE 5 MG: 5 TABLET ORAL at 09:21

## 2019-02-13 RX ADMIN — ONDANSETRON 4 MG: 2 INJECTION INTRAMUSCULAR; INTRAVENOUS at 15:38

## 2019-02-13 RX ADMIN — IOPAMIDOL 20 ML: 755 INJECTION, SOLUTION INTRAVENOUS at 15:52

## 2019-02-13 RX ADMIN — HYDRALAZINE HYDROCHLORIDE 10 MG: 20 INJECTION INTRAMUSCULAR; INTRAVENOUS at 10:41

## 2019-02-13 RX ADMIN — SODIUM CHLORIDE 50000 UNITS: 900 INJECTION, SOLUTION INTRAVENOUS at 16:12

## 2019-02-13 RX ADMIN — OXYCODONE AND ACETAMINOPHEN 1 TABLET: 5; 325 TABLET ORAL at 06:00

## 2019-02-13 RX ADMIN — ASPIRIN 81 MG 81 MG: 81 TABLET ORAL at 09:21

## 2019-02-13 RX ADMIN — PERFLUTREN 1 ML: 6.52 INJECTION, SUSPENSION INTRAVENOUS at 09:00

## 2019-02-13 RX ADMIN — MORPHINE SULFATE 2 MG: 2 INJECTION, SOLUTION INTRAMUSCULAR; INTRAVENOUS at 00:12

## 2019-02-13 RX ADMIN — MIDAZOLAM HYDROCHLORIDE 2 MG: 1 INJECTION, SOLUTION INTRAMUSCULAR; INTRAVENOUS at 15:50

## 2019-02-13 RX ADMIN — Medication 10 ML: at 22:00

## 2019-02-13 RX ADMIN — BUDESONIDE 500 MCG: 0.5 INHALANT RESPIRATORY (INHALATION) at 08:45

## 2019-02-13 RX ADMIN — LATANOPROST 1 DROP: 50 SOLUTION OPHTHALMIC at 21:34

## 2019-02-13 RX ADMIN — LORAZEPAM 1 MG: 1 TABLET ORAL at 03:15

## 2019-02-13 RX ADMIN — MIDAZOLAM HYDROCHLORIDE 2 MG: 1 INJECTION, SOLUTION INTRAMUSCULAR; INTRAVENOUS at 16:02

## 2019-02-13 RX ADMIN — AMLODIPINE BESYLATE 5 MG: 5 TABLET ORAL at 09:21

## 2019-02-13 RX ADMIN — Medication 2 G: at 06:00

## 2019-02-13 RX ADMIN — Medication 2 G: at 15:30

## 2019-02-13 RX ADMIN — FENTANYL CITRATE 50 MCG: 50 INJECTION, SOLUTION INTRAMUSCULAR; INTRAVENOUS at 16:02

## 2019-02-13 NOTE — PROGRESS NOTES
[facility] CARDIOLOGY PROGRESS NOTE 
      
 
2/13/2019 8:18 AM 
 
Admit Date: 2/12/2019 Subjective:  
Patient with oozing around RIJ site  Stil 100% V-paced. No complications at cath site. Renal function marginally worse. ROS: 
Cardiovascular:  As noted above Objective:  
  
Vitals:  
 02/13/19 6151 02/13/19 0630 02/13/19 1634 02/13/19 3877 BP:    152/70 Pulse: 60 60  60 Resp:    18 Temp:    97.9 °F (36.6 °C) SpO2: 92% 94%  97% Weight:   84.6 kg (186 lb 8.2 oz) Height:      
 
 
Physical Exam: 
General-No Acute Distress Neck- supple, no JVD 
CV- regular rate and rhythm no MRG Lung- clear bilaterally Abd- soft, nontender, nondistended Ext- no edema bilaterally. TAVR assess sites C/D/I. Skin- warm and dry Data Review:  
Recent Labs  
  02/13/19 
0250 02/11/19 
0848  143 K 4.3 4.7 MG  --  2.4 BUN 24* 20  
CREA 1.81* 1.53* * 104* WBC 13.7* 10.2 HGB 14.1 15.9 HCT 44.7 49.3 * 185 INR 1.1 1.0 No results found for: LYNN Eubanks Assessment/Plan:  
 
Principal Problem: S/P TAVR (transcatheter aortic valve replacement) (2/12/2019) Echo this AM.  On ASA and Plavix. Will need dual chamber pacemaker today. Active Problems: 
  Coronary artery disease involving native coronary artery of native heart without angina pectoris (1/4/2019) No angina. COPD (chronic obstructive pulmonary disease) ([facility] [address] (1/4/2019) Stable. Hypertension (2/7/2019) BP elevated. Add amlodipine. Complete heart block ([address] (2/13/2019) Dual chamber pacemaker today. Connor Wilson MD 
2/13/2019 8:18 AM]

## 2019-02-13 NOTE — PROGRESS NOTES
Bedside and verbal shift change report given to Jj Dyer RN Transvenous pacer site and bilateral groin sites visualized with oncoming RN

## 2019-02-13 NOTE — PROGRESS NOTES
TRANSFER - OUT REPORT: 
 
Verbal report given to Pacemaker team on Ralph Liu  being transferred to [facility](unit) for ordered procedure Report consisted of patients Situation, Background, Assessment and  
Recommendations(SBAR). Information from the following report(s) SBAR, Kardex, [facility] and Cardiac Rhythm paced was reviewed with the receiving nurse. Lines:  
Peripheral IV 02/12/19 Left Hand (Active) Site Assessment Clean, dry, & intact 2/13/2019  8:00 AM  
Phlebitis Assessment 0 2/13/2019  8:00 AM  
Infiltration Assessment 0 2/13/2019  8:00 AM  
Dressing Status Clean, dry, & intact 2/13/2019  8:00 AM  
Dressing Type Transparent 2/13/2019  8:00 AM  
Hub Color/Line Status Green; Infusing;Patent 2/13/2019  8:00 AM  
Action Taken Open ports on tubing capped 2/13/2019  8:00 AM  
   
Peripheral IV 02/12/19 Right Wrist (Active) Site Assessment Clean, dry, & intact 2/13/2019  8:00 AM  
Phlebitis Assessment 0 2/13/2019  8:00 AM  
Infiltration Assessment 0 2/13/2019  8:00 AM  
Dressing Status Clean, dry, & intact 2/13/2019  8:00 AM  
Dressing Type Transparent 2/13/2019  8:00 AM  
Hub Color/Line Status Gray;Patent 2/13/2019  8:00 AM  
Action Taken Open ports on tubing capped 2/13/2019  8:00 AM  
   
Arterial Line 02/12/19 Right Radial artery (Active) Site Assessment Clean, dry, & intact 2/13/2019  8:00 AM  
Dressing Status Clean, dry, & intact 2/13/2019  8:00 AM  
Dressing Type Transparent 2/13/2019  8:00 AM  
Line Status Intact and in place 2/13/2019  8:00 AM  
Treatment Arm board off;Zeroed or re-zeroed 2/13/2019  8:00 AM  
Affected Extremity/Extremities Color distal to insertion site pink (or appropriate for race); Pulses palpable 2/13/2019  8:00 AM  
  
 
Opportunity for questions and clarification was provided. Patient transported with: 
 Monitor O2 @ 3 liters Registered Nurse

## 2019-02-13 NOTE — PROGRESS NOTES
TRANSFER - IN REPORT: 
 
Verbal report received from [facility]) on Dangelo Check  being received from [facility](unit) for routine progression of care Report consisted of patients Situation, Background, Assessment and  
Recommendations(SBAR). Information from the following report(s) SBAR, Kardex, Procedure Summary, Intake/Output, MAR, Recent Results and Cardiac Rhythm paced was reviewed with the receiving nurse. Opportunity for questions and clarification was provided. Assessment completed upon patients arrival to unit and care assumed.

## 2019-02-13 NOTE — PROGRESS NOTES
made initial visit. Pt was asleep appearing comfortable with not pain level expressed or observed.  prayed for pt and left spiritual care care letting pt and family know that a  had been by and prayed for them.  provided spiritual care through presence, prayer, and leaving a spiritual care card.

## 2019-02-13 NOTE — PROGRESS NOTES
The sheath in Mr. Elisa Corea right neck has persisted in bleeding this shift. I have changed the dressing twice this shift. Pt has a hemostatic dressing under the tegaderm and I have, with the help of the charge nurse and another staff nurse applied a wedge pressure dressing just now in an attempt to obtain hemostasis. Pt c/o discomfort at the insertion site and has been medicated for his pain.

## 2019-02-13 NOTE — PROGRESS NOTES
TRANSFER - OUT REPORT: 
 
Verbal report given to SALENA De La Rosa(name) on Nawaf Seymour  being transferred to [facility](unit) for routine progression of care Report consisted of patients Situation, Background, Assessment and  
Recommendations(SBAR). Information from the following report(s) SBAR was reviewed with the receiving nurse. is allergic to methotrexate. Opportunity for questions and clarification was provided. Procedure Summary:Pt had PPM placed via L chest, covered with aquaseal, sling placed on pt. R IJ and temp pacer pulled, site dressed. Pt tolerated well. Med Administration Versed:  4 mg Fentanyl: 50 mcg Visit Vitals /52 (BP 1 Location: Right arm, BP Patient Position: Supine) Pulse 70 Temp 98 °F (36.7 °C) Resp 20 Ht 5' 10\" (1.778 m) Wt 84.6 kg (186 lb 8.2 oz) SpO2 93% BMI 26.76 kg/m² Past Medical History:  
Diagnosis Date  AAA (abdominal aortic aneurysm) without rupture ([facility] [address] 5.1 cm infrarenal abdominal aortic aneurysm; followed by=Rupert Price MD    
 Adverse effect of anesthesia   
 stopped breathing during gallbladder surgery in [facility]; had another surgery right after gallbladder surgery at [facility] and they all most lost him per patient's wife; no complications with recent surgeries in 2018 at [facility]  CAD (coronary artery disease) denies MI; no stents; hx. of CABG;  
 Chronic kidney disease   
 no nephrologist; stage 3- pt denies any kidney failure, only kidney stones  Chronic pain   
 due to neuropathy  COPD (chronic obstructive pulmonary disease) ([facility])   
 nebulizer prn; rescue inhaler prn  SANCHEZ (dyspnea on exertion)  Emphysema lung ([facility] [address]  Former smoker  GERD (gastroesophageal reflux disease)   
 tums prn   
 Heart murmur \"I was diagnosed at long time ago\";   
 Hemorrhoid  High cholesterol   
 no meds  History of kidney stones multiple-surgical intervention and naturally pass  History of sepsis 10/2018  
 related to kidney stone  History of skin cancer  Hx of CABG 2005  Hypertension   
 was taken off BP meds when septic in 10/2018 and hasn't taken since  Long term (current) use of systemic steroids Prednisone  Lung nodule  MRSA (methicillin resistant staph aureus) culture positive   
 positive nasal swab collected on PAT  MSSA (methicillin susceptible Staphylococcus aureus) positive MSSA nasal swab collected at PAT  Nephrolithiasis  Neuropathy   
 feet and hands  On home oxygen therapy 2L/NC at bedtime and during the day prn for sob  RA (rheumatoid arthritis) ([address]   
 managed with Remicade  Severe aortic stenosis  Tattoo Left FA Arterial Line 02/12/19 Right Radial artery (Active) Site Assessment Clean, dry, & intact 2/13/2019  8:00 AM  
Dressing Status Clean, dry, & intact 2/13/2019  8:00 AM  
Dressing Type Transparent 2/13/2019  8:00 AM  
Line Status Intact and in place 2/13/2019  8:00 AM  
Treatment Arm board off;Zeroed or re-zeroed 2/13/2019  8:00 AM  
Affected Extremity/Extremities Color distal to insertion site pink (or appropriate for race); Pulses palpable 2/13/2019  8:00 AM  
 
  
Peripheral IV 02/12/19 Left Hand (Active) Site Assessment Clean, dry, & intact 2/13/2019  8:00 AM  
Phlebitis Assessment 0 2/13/2019  8:00 AM  
Infiltration Assessment 0 2/13/2019  8:00 AM  
Dressing Status Clean, dry, & intact 2/13/2019  8:00 AM  
Dressing Type Transparent 2/13/2019  8:00 AM  
Hub Color/Line Status Green; Infusing;Patent 2/13/2019  8:00 AM  
Action Taken Open ports on tubing capped 2/13/2019  8:00 AM  
   
Peripheral IV 02/12/19 Right Wrist (Active) Site Assessment Clean, dry, & intact 2/13/2019  8:00 AM  
Phlebitis Assessment 0 2/13/2019  8:00 AM  
Infiltration Assessment 0 2/13/2019  8:00 AM  
Dressing Status Clean, dry, & intact 2/13/2019  8:00 AM  
 Dressing Type Transparent 2/13/2019  8:00 AM  
Hub Color/Line Status Gray;Patent 2/13/2019  8:00 AM  
Action Taken Open ports on tubing capped 2/13/2019  8:00 AM  
 
Sheath 02/12/19 (Active) Site Assessment Bleeding 2/13/2019  8:00 AM  
Dressing Status Intact; Old drainage;New drainage 2/13/2019  8:00 AM  
Dressing Type Gauze;Tape 2/13/2019  8:00 AM  
Hub Color/Line Status Patent 2/13/2019  8:00 AM  
Hemostasis  Compression assistance 2/13/2019  5:00 AM

## 2019-02-13 NOTE — PROCEDURES
Cardiac Catheterization Procedure Note pacer/loop    Patient ID:     Name: Len Flores   Medical Record Number: [id number]   YOB: 1940    Date of Procedure: 2/13/2019    Physician: Katarzyna Kevin MD    Referring candido    Indications: This is a 69 yrs male who presents with CHB. Pre procedure dx CHB  Post procedure dx DDD pacer biotronik    Blood loss less than 5 ml    Sedation. Pt received 2 mg versed and 50 mcg fentanyl for monitored conscious sedation from 3:45 to 4:30. Specimen: None    No complications    No assistants    Time out, Mallampati, and ASA performed    Procedure: left pectoral region was cleaned and prepped in a sterile fashion. Lidocaine was used for local anesthesia. Modified Seldinger technique was used to gain access to the subclavian vein. Pacer pocket was made with sharp and blunt dissection. Sheaths were placed which allowed for placement of a dual chamber device. After appropriate placement of leads the sheaths were removed and leads were attached to the pre pectoral fascia. The pocket was flushed with antibiotic solution. The device was attached to the appropriate leads and set screws were tightened. Closure was then performored with 2.0 V Audrey and 3.0 V Audrey to close the skin. All counts were correct    Dressing was applied to the skin    Pulse generator was a [employer] 8 DR CAMP serial number [id number] .      Atrial lead was a SOLIA S 45 serial number [id number]   Threshold NA V @   ms   A wave 4.2mV   Impedence 468 ohms    Ventricular lead was a SOLIA S 53 serial number [id number]    Threshold 0.5 V @ 0.4 ms   V wave 10.9 mV   [id number] ohms    Settings DDD    Quick look echo shows no effusion

## 2019-02-13 NOTE — PROGRESS NOTES
Pt's ble access sites stable. I retapped his wedge dressing at 0300 and I have not observed any more bleeding from his R IJ sheath site. Pt bathed and linens changed at 0630. Report given to day shift.

## 2019-02-14 VITALS
BODY MASS INDEX: 26.86 KG/M2 | TEMPERATURE: 98.3 F | HEART RATE: 109 BPM | SYSTOLIC BLOOD PRESSURE: 143 MMHG | HEIGHT: 70 IN | RESPIRATION RATE: 31 BRPM | OXYGEN SATURATION: 94 % | WEIGHT: 187.61 LBS | DIASTOLIC BLOOD PRESSURE: 62 MMHG

## 2019-02-14 LAB
ANION GAP SERPL CALC-SCNC: 9 MMOL/L (ref 7–16)
ATRIAL RATE: 97 BPM
BUN SERPL-MCNC: 26 MG/DL (ref 8–23)
CALCIUM SERPL-MCNC: 8 MG/DL (ref 8.3–10.4)
CALCULATED R AXIS, ECG10: -88 DEGREES
CALCULATED T AXIS, ECG11: 83 DEGREES
CHLORIDE SERPL-SCNC: 108 MMOL/L (ref 98–107)
CO2 SERPL-SCNC: 22 MMOL/L (ref 21–32)
CREAT SERPL-MCNC: 1.84 MG/DL (ref 0.8–1.5)
DIAGNOSIS, 93000: NORMAL
ERYTHROCYTE [DISTWIDTH] IN BLOOD BY AUTOMATED COUNT: 13.6 % (ref 11.9–14.6)
GLUCOSE SERPL-MCNC: 106 MG/DL (ref 65–100)
HCT VFR BLD AUTO: 41.1 % (ref 41.1–50.3)
HGB BLD-MCNC: 13.4 G/DL (ref 13.6–17.2)
MCH RBC QN AUTO: 31.7 PG (ref 26.1–32.9)
MCHC RBC AUTO-ENTMCNC: 32.6 G/DL (ref 31.4–35)
MCV RBC AUTO: 97.2 FL (ref 79.6–97.8)
NRBC # BLD: 0 K/UL (ref 0–0.2)
PLATELET # BLD AUTO: 101 K/UL (ref 150–450)
PMV BLD AUTO: 11.6 FL (ref 9.4–12.3)
POTASSIUM SERPL-SCNC: 3.7 MMOL/L (ref 3.5–5.1)
Q-T INTERVAL, ECG07: 432 MS
QRS DURATION, ECG06: 170 MS
QTC CALCULATION (BEZET), ECG08: 557 MS
RBC # BLD AUTO: 4.23 M/UL (ref 4.23–5.6)
SODIUM SERPL-SCNC: 139 MMOL/L (ref 136–145)
VENTRICULAR RATE, ECG03: 100 BPM
WBC # BLD AUTO: 14.7 K/UL (ref 4.3–11.1)

## 2019-02-14 PROCEDURE — 74011000250 HC RX REV CODE- 250: Performed by: INTERNAL MEDICINE

## 2019-02-14 PROCEDURE — 93005 ELECTROCARDIOGRAM TRACING: CPT | Performed by: INTERNAL MEDICINE

## 2019-02-14 PROCEDURE — 36600 WITHDRAWAL OF ARTERIAL BLOOD: CPT

## 2019-02-14 PROCEDURE — 74011250636 HC RX REV CODE- 250/636: Performed by: INTERNAL MEDICINE

## 2019-02-14 PROCEDURE — 74011250637 HC RX REV CODE- 250/637: Performed by: PHYSICIAN ASSISTANT

## 2019-02-14 PROCEDURE — 74011636637 HC RX REV CODE- 636/637: Performed by: PHYSICIAN ASSISTANT

## 2019-02-14 PROCEDURE — 74011250637 HC RX REV CODE- 250/637: Performed by: INTERNAL MEDICINE

## 2019-02-14 PROCEDURE — 80048 BASIC METABOLIC PNL TOTAL CA: CPT

## 2019-02-14 PROCEDURE — 94640 AIRWAY INHALATION TREATMENT: CPT

## 2019-02-14 PROCEDURE — 85027 COMPLETE CBC AUTOMATED: CPT

## 2019-02-14 RX ORDER — CLOPIDOGREL BISULFATE 75 MG/1
75 TABLET ORAL DAILY
Qty: 30 TAB | Refills: 11 | Status: SHIPPED | OUTPATIENT
Start: 2019-02-15 | End: 2019-09-24 | Stop reason: SINTOL

## 2019-02-14 RX ORDER — AMLODIPINE BESYLATE 10 MG/1
10 TABLET ORAL DAILY
Qty: 30 TAB | Refills: 11 | Status: SHIPPED | OUTPATIENT
Start: 2019-02-14 | End: 2019-04-16

## 2019-02-14 RX ORDER — AMLODIPINE BESYLATE 5 MG/1
10 TABLET ORAL DAILY
Qty: 30 TAB | Refills: 11 | Status: SHIPPED | OUTPATIENT
Start: 2019-02-14 | End: 2019-02-14

## 2019-02-14 RX ADMIN — CLOPIDOGREL 75 MG: 75 TABLET, FILM COATED ORAL at 09:47

## 2019-02-14 RX ADMIN — ALBUTEROL SULFATE 2.5 MG: 2.5 SOLUTION RESPIRATORY (INHALATION) at 07:09

## 2019-02-14 RX ADMIN — BUDESONIDE 500 MCG: 0.5 INHALANT RESPIRATORY (INHALATION) at 07:09

## 2019-02-14 RX ADMIN — ASPIRIN 81 MG 81 MG: 81 TABLET ORAL at 09:47

## 2019-02-14 RX ADMIN — Medication 10 ML: at 05:30

## 2019-02-14 RX ADMIN — Medication 2 G: at 05:30

## 2019-02-14 RX ADMIN — PREDNISONE 5 MG: 5 TABLET ORAL at 09:47

## 2019-02-14 RX ADMIN — ACETAMINOPHEN 650 MG: 325 TABLET, FILM COATED ORAL at 07:24

## 2019-02-14 RX ADMIN — AMLODIPINE BESYLATE 5 MG: 5 TABLET ORAL at 07:26

## 2019-02-14 RX ADMIN — TAMSULOSIN HYDROCHLORIDE 0.4 MG: 0.4 CAPSULE ORAL at 09:47

## 2019-02-14 RX ADMIN — HYDROCODONE BITARTRATE AND ACETAMINOPHEN 1 TABLET: 5; 325 TABLET ORAL at 02:58

## 2019-02-14 NOTE — DISCHARGE INSTRUCTIONS
HOME INSTRUCTION FOLLOWING TRANSCATHETER AORTIC VALVE REPLACEMENT (TAVR)    What is my main problem? You have just had your diseased aortic valve replaced with an artificial valve. Below is what you need to do when you go home. What do I need to do? ACTIVITY:  · Weigh yourself every day in the morning after using the bathroom. · Get up and get dressed every day. Do not stay in bed. · Set a daily routine. · When you are tired, rest.  · NO DRIVING For 1 WEEK. You may ride in a car. · Please let your doctor know if you need to leave town before your first follow-up        visit. · Do NOT lift more than five to ten pounds, No Straining, Stooping, or Squatting for two weeks. · You may walk up and down stairs. · Walk as much as you can. ·   Cough and deep breathe, and use your incentive spirometer 10 times each                           hour when you are awake. DIET:  · We recommend the Cardiac diet. Your nurse can provide a printed handout to you on this diet. INCISION CARE:    · Shower every day. No tub baths for the first week you are at home. · Cleanse wounds with mild soap and water. Keep wounds dry. · A small amount of bloody or clear drainage is normal.  · Watch for signs of infection: redness, incision hot to the touch, fever greater than 101 degrees, swelling at the groin incision site, or discolored drainage from your incision. MEDICATIONS:    · DO NOT STOP TAKING YOUR MEDICINES WITHOUT SPEAKING WITH   YOUR CARDIOLOGIST! · Take your pills as ordered at the time of discharge. · Do  not stop taking any medicine without first discussing it with your doctor. · Avoid all over the counter medications, herbal, and natural supplements unless you have discussed them with your doctor. ·    It is important to follow your doctors orders, especially if blood thinning drugs                    are prescribed.  Your doctor will monitor your medicine and advise you when or if you can stop taking       Why is it important for me to do this? ·  Regular check-ups by your cardiologist are very important. It is easier for                           patients with a heart valve replacement to get infections, which could lead to                     future heart damage. ·  Before any dental work or surgery is done, tell your dentist or surgeon                       about your heart valve replacement. You may need to take antibiotics                           before and after some medical procedures to reduce the risk of infection. ·  Always tell the doctor (or medical technician) that you have an implanted heart                  Valve. What can I expect? HEALTHY HABITS: To maintain a healthy physical condition, we have the following suggestions:  · No smoking!!! If you need help to stop smoking, please contact your doctor. · Attempt to achieve and maintain your ideal body weight. ·    Walking is great exercise for the body and the mind! We suggest that you walk                  as much as you can    When to call the doctor or speak to the nurse:    · Weight gain of 3 pounds in one day or 5 pounds in one week. Weight gain is          NOT normal.  · Swelling of the legs, ankles, or feet  · Increasing shortness of breath  · Change in the color or temperature of your lower legs and feet. · Develop abdominal pain or unrelieved nausea and/or vomiting. · Develop any numbness, tingling, or limited movement of your arms or legs. · Signs of infection: redness, incision hot to the touch, fevers greater than 101 degrees, or colored drainage from your incision. · Seroma is an accumulation of fluid in the tissues at the groin surgical site. Symptoms may include: a lump near the groin surgical site, clear fluid draining from the site, redness, warmth, or swelling.     ADDITIONAL INFORMATION:  · Your follow-up echocardiogram has been scheduled along with your 30 day TAVR follow up visit. This visit is very important for you, so be sure to attend this visit. We are here for you! If you have any questions, please feel free to call your physician. His office number is:    Dr. Deb Marr Delay  [phone]                   Valve Coordinator- [place] - [phone]    Patient Education        Pacemaker Placement: What to Expect at [address] [place] [address]    Pacemaker placement is surgery to put a pacemaker in your chest. This surgery may be done if you have bradycardia (a slow heart rate). A pacemaker is a small, battery-powered device. It sends electrical signals to the heart. These signals work to keep the heartbeat steady. Thin wires, called leads, carry the signals from the pacemaker to the heart. A pacemaker can prevent or reduce dizziness, fainting, and shortness of breath caused by a slow or unsteady heartbeat. Your chest may be sore where the doctor made the cut (incision) and put in the pacemaker. You also may have a bruise and mild swelling. These symptoms usually get better in 1 to 2 weeks. You may feel a hard ridge along the incision. This usually gets softer in the months after surgery. You may be able to see or feel the outline of the pacemaker under your skin. You will probably be able to go back to work or your usual routine 1 to 2 weeks after surgery. Pacemaker batteries usually last 5 to 15 years. Your doctor will talk to you about how often you will need to have your pacemaker checked. You can safely use most household and office electronics. This includes things such as kitchen appliances, electric power tools, and computers. You will need to stay away from things with strong magnetic and electrical fields.  This includes things such as an MRI machine (unless your pacemaker is safe for an MRI), welding equipment, and power generators. You can use a cell phone, but keep it at least 6 inches away from your pacemaker. Check with your doctor about what you need to stay away from, what you need to use with care, and what is okay to use. This care sheet gives you a general idea about how long it will take for you to recover. But each person recovers at a different pace. Follow the steps below to get better as quickly as possible. How can you care for yourself at home? Activity    · Rest when you feel tired.     · Be active. Walking is a good choice.     · For 4 to 6 weeks:  ? Avoid activities that strain your chest or upper arm muscles. This includes pushing a  or vacuum, or mopping floors. It also includes swimming, or swinging a golf club or tennis racquet. ? Do not raise your arm (the one on the side of your body where the pacemaker is located) above your shoulder. ? Allow your body to heal. Don't move quickly or lift anything heavy until you are feeling better.     · Many people are able to return to work within 1 to 2 weeks after surgery.     · Ask your doctor when it is okay for you to have sex. Diet    · You can eat your normal diet. If your stomach is upset, try bland, low-fat foods like plain rice, broiled chicken, toast, and yogurt. Medicines    · Your doctor will tell you if and when you can restart your medicines. He or she will also give you instructions about taking any new medicines.     · If you take aspirin or some other blood thinner, be sure to talk to your doctor. He or she will tell you if and when to start taking this medicine again. Make sure that you understand exactly what your doctor wants you to do.     · Be safe with medicines. Read and follow all instructions on the label. ? If the doctor gave you a prescription medicine for pain, take it as prescribed. ? If you are not taking a prescription pain medicine, ask your doctor if you can take an over-the-counter medicine.   ? Do not take aspirin, ibuprofen (Advil, Motrin), naproxen (Aleve), or other nonsteroidal anti-inflammatory drugs (NSAIDs) unless your doctor says it is okay.     · If your doctor prescribed antibiotics, take them as directed. Do not stop taking them just because you feel better. You need to take the full course of antibiotics. Incision care    · If you have strips of tape on the incision, leave the tape on for a week or until it falls off.     · Keep the incision dry while it heals. Your doctor may recommend sponge baths for about 7 days, but do not get the incision wet. Your doctor will let you know when you may take showers. After a shower, pat the incision dry.     · Don't use hydrogen peroxide or alcohol on the incision, which can slow healing. You may cover the area with a gauze bandage if it oozes fluid or rubs against clothing. Change the bandage every day.     · Do not take a bath or get into a hot tub for the first 2 weeks, or until your doctor tells you it is okay. Other instructions    · Keep a medical ID card with you at all times that says you have a pacemaker. The card should include the  and model information.     · Wear medical alert jewelry stating that you have a pacemaker. You can buy this at most [facility].     · Check your pulse as directed by your doctor.     · Have your pacemaker checked as often as your doctor recommends. In some cases, this may be done over the phone or the Internet. Your doctor will give you instructions about how to do this. Follow-up care is a key part of your treatment and safety. Be sure to make and go to all appointments, and call your doctor if you are having problems. It's also a good idea to know your test results and keep a list of the medicines you take. When should you call for help? Call 911 anytime you think you may need emergency care.  For example, call if:    · You passed out (lost consciousness).     · You have trouble breathing.    Call your doctor now or seek immediate medical care if:    · You are dizzy or light-headed, or you feel like you may faint.     · You have pain that does not get better after you take pain medicine.     · You hear an alarm or feel a vibration from your pacemaker.     · You have loose stitches, or your incision comes open.     · Bright red blood has soaked through the bandage over your incision.     · You have signs of infection, such as:  ? Increased pain, swelling, warmth, or redness. ? Red streaks leading from the incision. ? Pus draining from the incision. ? A fever.    Watch closely for changes in your health, and be sure to contact your doctor if:    · You have any problems with your pacemaker. Where can you learn more? Go to http://sebastian-ran.info/. Enter G550 in the search box to learn more about \"Pacemaker Placement: What to Expect at Home. \"  Current as of: July 22, 2018  Content Version: 11.9  © 6580-6759 [employer]. Care instructions adapted under license by [employer] (which disclaims liability or warranty for this information). If you have questions about a medical condition or this instruction, always ask your healthcare professional. [place] [address] any warranty or liability for your use of this information. BSHSI:ALYCIA:[employer] DISCHARGE SUMMARY NOTE    DISCHARGE SUMMARY:     Patient ID:  Name: Michael Montemayor  Discharge Date: 2/14/2019  Time: 11:38 AM    The following personal items collected during your admission are returned to you:   Dental Appliance: Dental Appliances: Lowers, Uppers  Vision: Visual Aid: None  Hearing Aid:    Jewelry: Jewelry: None  Clothing: Clothing: Footwear, Jacket/Coat, Pants, Shirt, Socks, Undergarments  Other Valuables:  Other Valuables: Eyeglasses  Valuables sent to safe: Personal Items Sent to Safe: none      Stroke risk factors: overweight, smoking, sedentary lifestyle    Call 911 immediately if sudden numbness or weakness of an extremity is noted. Warning signs of a stroke include:    · Sudden numbness or weakness of the face (is smile equal on both sides?)  · Sudden numbness or weakness of the arm or leg, especially on one side of the body  · Sudden confusion, trouble speaking or understanding. I have reviewed discharge instructions with the patient. The patient verbalized understanding.

## 2019-02-14 NOTE — DISCHARGE SUMMARY
[facility] Discharge Summary     Patient ID:  Sherry Hicks  [id number]  7 y.o.  1940    Admit date: 2/12/2019    Discharge date:  02/14/19    Admitting Physician: Soraya Lomax MD     Discharge Physician: Reese Cortes NP/Dr. Anitha Washburn    Admission Diagnoses: Aortic valve stenosis, etiology of cardiac valve disease unspecified [I35.0]  Aortic valve stenosis [I35.0]  Aortic stenosis [I35.0]    Discharge Diagnoses:   Patient Active Problem List    Diagnosis Date Noted    Complete heart block ([facility] 02/13/2019    Aortic valve stenosis 02/12/2019    Aortic stenosis 02/12/2019    S/P TAVR (transcatheter aortic valve replacement) 02/12/2019    [facility] 02/12/2019    RA (rheumatoid arthritis) ([facility] 02/12/2019    High cholesterol 02/12/2019    Hypertension 02/07/2019    Pre-op exam 01/04/2019    Coronary artery disease involving native coronary artery of native heart without angina pectoris 01/04/2019    COPD (chronic obstructive pulmonary disease) ([facility] 01/04/2019       Cardiology Procedures this admission:  Transcatheter aortic valve replacement, PPM placement  Consults: None    Hospital Course: Patient was seen at the office of [facility] by Dr. Bayron Morales in follow up for severe aortic stenosis. He was seen by CTS and felt to be high risk for surgical AVR. The patient was felt to be an appropriate candidate for TAVR. He was admitted for planned TAVR. The patient underwent successful balloon valvuloplasty and transcatheter aortic valve replacement with a 26 mm Thompson Mitchell 3 valve by the right femoral approch. The temporary pacemaker was left in place due to heart block. The patient was monitored closely in the CVICU. The patient continued to have heart block  With 100% V paced requiring the impilation of a PPM by Dr Jessica Robles on 3/13/2018.   A [place] 8 DR [employer] PPM was placed with a Solia S 45 Atrial lead and a Solia S 53 Ventricular lead with settings of DDD. His post op X ray show no signs of a pneumothorax. The next day his device was checked to show normal function. The morning of 02/14/19, patient was up feeling well without any complaints of chest pain or shortness of breath. Patient's labs were stable. Patient was seen and examined by Dr. Calvin Mckenzie and determined stable and ready for discharge. Patient was instructed on the importance of medication compliance including dual anti-platelet therapy for at least 6 months. The patient will have transitional care follow up with [facility]   and repeat echocardiogram will be obtained in 1 month. DISPOSITION: The patient is being discharged home in stable condition on a low saturated fat, low cholesterol and low salt diet. The patient is instructed to advance activities as tolerated to the limit of fatigue or shortness of breath. The patient is instructed to avoid lifting anything heavier than 10 lbs for 2 weeks. The patient is instructed to avoid any straining, stooping or squatting for 2 weeks. The patient is instructed not to drive for 1 week. The patient is instructed to watch the groin site for bleeding/oozing; if seen, the patient is instructed to apply firm pressure with a clean cloth and call [facility] at [phone]. The patient is instructed to watch for signs of infection which include: increasing area of redness, fever/hot to touch or purulent drainage at the groin site. The patient is instructed not to soak in a bathtub for 7-10 days, but is cleared to shower. The patient is instructed to return to the ER immediately for any severe pain, color change, or temperature change in leg. The patient is informed that prophylaxis for bacterial endocarditis is recommended for high-risk procedures such as all dental procedures that involve manipulation of gingival tissue, the periapical region of teeth, or perforation of the oral mucosa. Discharge Exam:   Visit Vitals  BP (!) 158/104   Pulse (!) 109   Temp 98.4 °F (36.9 °C)   Resp (!) 31   Ht 5' 10\" (1.778 m)   Wt 85.1 kg (187 lb 9.8 oz)   SpO2 94%   BMI 26.92 kg/m²     Patient has been seen by Dr. Dottie Young: see his progress note for exam details. Recent Results (from the past 24 hour(s))   EKG, 12 LEAD, INITIAL    Collection Time: 02/13/19  5:07 PM   Result Value Ref Range    Ventricular Rate 106 BPM    Atrial Rate 106 BPM    QRS Duration 172 ms    Q-T Interval 428 ms    QTC Calculation (Bezet) 568 ms    Calculated R Axis -91 degrees    Calculated T Axis 84 degrees    Diagnosis       Ventricular-paced rhythm  Abnormal ECG  When compared with ECG of 13-FEB-2019 06:36,  Vent.  rate has increased BY  46 BPM  Confirmed by PEDRO FOREMAN (), MARSHA VALDOVINOS ([id number]) on 2/13/2019 3:92:20 PM     METABOLIC PANEL, BASIC    Collection Time: 02/14/19  3:02 AM   Result Value Ref Range    Sodium 139 136 - 145 mmol/L    Potassium 3.7 3.5 - 5.1 mmol/L    Chloride 108 (H) 98 - 107 mmol/L    CO2 22 21 - 32 mmol/L    Anion gap 9 7 - 16 mmol/L    Glucose 106 (H) 65 - 100 mg/dL    BUN 26 (H) 8 - 23 MG/DL    Creatinine 1.84 (H) 0.8 - 1.5 MG/DL    GFR est AA 46 (L) >60 ml/min/1.73m2    GFR est non-AA 38 (L) >60 ml/min/1.73m2    Calcium 8.0 (L) 8.3 - 10.4 MG/DL   CBC W/O DIFF    Collection Time: 02/14/19  3:02 AM   Result Value Ref Range    WBC 14.7 (H) 4.3 - 11.1 K/uL    RBC 4.23 4.23 - 5.6 M/uL    HGB 13.4 (L) 13.6 - 17.2 g/dL    HCT 41.1 41.1 - 50.3 %    MCV 97.2 79.6 - 97.8 FL    MCH 31.7 26.1 - 32.9 PG    MCHC 32.6 31.4 - 35.0 g/dL    RDW 13.6 11.9 - 14.6 %    PLATELET 669 (L) 847 - 450 K/uL    MPV 11.6 9.4 - 12.3 FL    ABSOLUTE NRBC 0.00 0.0 - 0.2 K/uL   EKG, 12 LEAD, INITIAL    Collection Time: 02/14/19  7:25 AM   Result Value Ref Range    Ventricular Rate 100 BPM    Atrial Rate 97 BPM    QRS Duration 170 ms    Q-T Interval 432 ms    QTC Calculation (Bezet) 557 ms    Calculated R Axis -88 degrees    Calculated T Axis 83 degrees    Diagnosis       Ventricular-paced rhythm  Abnormal ECG  When compared with ECG of 13-FEB-2019 17:07,  Vent. rate has decreased BY   6 BPM  Confirmed by CHUCKIE FOREMAN (), Shamika Fry ([id number]) on 2/14/2019 8:48:21 AM           Patient Instructions:       Current Discharge Medication List      START taking these medications    Details   clopidogrel (PLAVIX) 75 mg tab Take 1 Tab by mouth daily. Qty: 30 Tab, Refills: 11      amLODIPine (NORVASC) 10 mg tablet Take 1 Tab by mouth daily. Qty: 30 Tab, Refills: 11         CONTINUE these medications which have NOT CHANGED    Details   mupirocin calcium (BACTROBAN NASAL) 2 % nasal ointment by Both Nostrils route two (2) times a day. aspirin delayed-release 81 mg tablet Take 81 mg by mouth daily. predniSONE (DELTASONE) 5 mg tablet Take 5 mg by mouth daily. infliximab (REMICADE IV) by IntraVENous route. Every 7-8 weeks; last infusion 12/19/18      acetaminophen (TYLENOL) 325 mg cap Take 1,000 mg by mouth nightly as needed for Pain. travoprost (TRAVATAN Z) 0.004 % ophthalmic solution Administer 1 Drop to left eye nightly. brimonidine (ALPHAGAN P) 0.1 % ophthalmic solution Administer 1 Drop to left eye nightly. fluticasone-vilanterol (BREO ELLIPTA) 200-25 mcg/dose inhaler Take 1 Puff by inhalation as needed. Miranda Dana albuterol (PROVENTIL VENTOLIN) 2.5 mg /3 mL (0.083 %) nebulizer solution 2.5 mg by Nebulization route three (3) times daily as needed for Wheezing or Shortness of Breath. Take / use AM day of surgery  per anesthesia protocols if needed. tamsulosin (FLOMAX) 0.4 mg capsule Take 0.4 mg by mouth daily. albuterol (PROAIR HFA) 90 mcg/actuation inhaler Take 2 Puffs by inhalation every four (4) hours as needed for Wheezing or Shortness of Breath. Take / use AM day of surgery  per anesthesia protocols. cholecalciferol (VITAMIN D3) 1,000 unit tablet Take 1,000 Units by mouth daily.       HYDROcodone-acetaminophen (NORCO) 7.5-325 mg per tablet Take 1 Tab by mouth nightly as needed for Pain.              Signed:  KARINA Starks  2/14/2019  11:24 AM

## 2019-02-15 LAB
ABO + RH BLD: NORMAL
BLD PROD TYP BPU: NORMAL
BLOOD GROUP ANTIBODIES SERPL: NORMAL
BPU ID: NORMAL
CROSSMATCH RESULT,%XM: NORMAL
SPECIMEN EXP DATE BLD: NORMAL
STATUS OF UNIT,%ST: NORMAL
UNIT DIVISION, %UDIV: 0

## 2019-02-25 DIAGNOSIS — I35.0 AORTIC VALVE STENOSIS, ETIOLOGY OF CARDIAC VALVE DISEASE UNSPECIFIED: Primary | ICD-10-CM

## 2019-02-25 PROBLEM — Z95.0 S/P PLACEMENT OF CARDIAC PACEMAKER: Chronic | Status: ACTIVE | Noted: 2019-02-13

## 2019-03-21 ENCOUNTER — HOSPITAL ENCOUNTER (OUTPATIENT)
Dept: LAB | Age: 79
Discharge: HOME | End: 2019-03-21
Payer: MEDICARE

## 2019-03-21 DIAGNOSIS — I35.0 AORTIC VALVE STENOSIS, ETIOLOGY OF CARDIAC VALVE DISEASE UNSPECIFIED: ICD-10-CM

## 2019-03-21 PROBLEM — R91.1 LUNG NODULE: Chronic | Status: ACTIVE | Noted: 2019-03-21

## 2019-03-21 LAB
ANION GAP SERPL CALC-SCNC: 11 MMOL/L
BUN SERPL-MCNC: 26 MG/DL (ref 8–23)
CALCIUM SERPL-MCNC: 9.2 MG/DL (ref 8.3–10.4)
CHLORIDE SERPL-SCNC: 107 MMOL/L (ref 98–107)
CO2 SERPL-SCNC: 25 MMOL/L (ref 21–32)
CREAT SERPL-MCNC: 2 MG/DL (ref 0.8–1.5)
ERYTHROCYTE [DISTWIDTH] IN BLOOD BY AUTOMATED COUNT: 13 % (ref 11.9–14.6)
GLUCOSE SERPL-MCNC: 98 MG/DL (ref 65–100)
HCT VFR BLD AUTO: 38.6 % (ref 41.1–50.3)
HGB BLD-MCNC: 12.6 G/DL (ref 13.6–17.2)
MCH RBC QN AUTO: 31.3 PG (ref 26.1–32.9)
MCHC RBC AUTO-ENTMCNC: 32.6 G/DL (ref 31.4–35)
MCV RBC AUTO: 96 FL (ref 79.6–97.8)
NRBC # BLD: 0 K/UL (ref 0–0.2)
PLATELET # BLD AUTO: 182 K/UL (ref 150–450)
PMV BLD AUTO: 10.2 FL (ref 9.4–12.3)
POTASSIUM SERPL-SCNC: 3.5 MMOL/L (ref 3.5–5.1)
RBC # BLD AUTO: 4.02 M/UL (ref 4.23–5.6)
SODIUM SERPL-SCNC: 143 MMOL/L (ref 136–145)
WBC # BLD AUTO: 16.3 K/UL (ref 4.3–11.1)

## 2019-03-21 PROCEDURE — 85027 COMPLETE CBC AUTOMATED: CPT

## 2019-03-21 PROCEDURE — 36415 COLL VENOUS BLD VENIPUNCTURE: CPT

## 2019-03-21 PROCEDURE — 80048 BASIC METABOLIC PNL TOTAL CA: CPT

## 2019-04-09 ENCOUNTER — HOSPITAL ENCOUNTER (OUTPATIENT)
Dept: PET IMAGING | Age: 79
Discharge: HOME | End: 2019-04-09
Payer: MEDICARE

## 2019-04-09 DIAGNOSIS — R91.1 PULMONARY NODULE: ICD-10-CM

## 2019-04-09 DIAGNOSIS — J44.9 STAGE 2 MODERATE COPD BY GOLD CLASSIFICATION (HCC): ICD-10-CM

## 2019-04-09 PROCEDURE — A9552 F18 FDG: HCPCS

## 2019-04-09 PROCEDURE — 74011636320 HC RX REV CODE- 636/320: Performed by: INTERNAL MEDICINE

## 2019-04-09 RX ORDER — SODIUM CHLORIDE 0.9 % (FLUSH) 0.9 %
10 SYRINGE (ML) INJECTION
Status: COMPLETED | OUTPATIENT
Start: 2019-04-09 | End: 2019-04-09

## 2019-04-09 RX ADMIN — DIATRIZOATE MEGLUMINE AND DIATRIZOATE SODIUM 10 ML: 660; 100 LIQUID ORAL; RECTAL at 11:25

## 2019-04-09 RX ADMIN — Medication 10 ML: at 11:25

## 2019-04-12 NOTE — PROGRESS NOTES
Planning for EBUS followed by consideration for nodule resection if early stage. Azra, thanks for arranging and notifying him of this result.   Rika Mast MD

## 2019-04-15 ENCOUNTER — TELEPHONE (OUTPATIENT)
Dept: CASE MANAGEMENT | Age: 79
End: 2019-04-15

## 2019-04-15 NOTE — TELEPHONE ENCOUNTER
Spouse called to say that the pt had complaints of SOB and ankle edema and that the pt and spouse stopped taking amlodipine yesterday and after stopping it for one day the pt felt much better and the edema was decreased. I spoke with Dr. Kendal Rincon and orders received that pt can stay off of the amlodipine but needs to monitor BP closely and if his BP becomes elevated they need to call back.     Maci Palafox, TAVR Coordinator

## 2019-04-16 ENCOUNTER — HOSPITAL ENCOUNTER (INPATIENT)
Age: 79
LOS: 2 days | Discharge: HOME | DRG: 291 | End: 2019-04-18
Attending: INTERNAL MEDICINE | Admitting: INTERNAL MEDICINE
Payer: MEDICARE

## 2019-04-16 ENCOUNTER — APPOINTMENT (OUTPATIENT)
Dept: GENERAL RADIOLOGY | Age: 79
DRG: 291 | End: 2019-04-16
Attending: INTERNAL MEDICINE
Payer: MEDICARE

## 2019-04-16 DIAGNOSIS — E87.70 HYPERVOLEMIA, UNSPECIFIED HYPERVOLEMIA TYPE: ICD-10-CM

## 2019-04-16 DIAGNOSIS — R09.02 HYPOXIA: ICD-10-CM

## 2019-04-16 DIAGNOSIS — Z95.2 S/P TAVR (TRANSCATHETER AORTIC VALVE REPLACEMENT): Chronic | ICD-10-CM

## 2019-04-16 DIAGNOSIS — R91.1 LUNG NODULE: Chronic | ICD-10-CM

## 2019-04-16 DIAGNOSIS — N18.30 STAGE 3 CHRONIC KIDNEY DISEASE (HCC): ICD-10-CM

## 2019-04-16 DIAGNOSIS — R06.02 SOB (SHORTNESS OF BREATH): ICD-10-CM

## 2019-04-16 PROBLEM — Z95.1 S/P CABG (CORONARY ARTERY BYPASS GRAFT): Status: ACTIVE | Noted: 2019-04-16

## 2019-04-16 LAB
ANION GAP SERPL CALC-SCNC: 8 MMOL/L (ref 7–16)
BNP SERPL-MCNC: 922 PG/ML
BUN SERPL-MCNC: 25 MG/DL (ref 8–23)
CALCIUM SERPL-MCNC: 8.8 MG/DL (ref 8.3–10.4)
CHLORIDE SERPL-SCNC: 109 MMOL/L (ref 98–107)
CO2 SERPL-SCNC: 24 MMOL/L (ref 21–32)
CREAT SERPL-MCNC: 2.12 MG/DL (ref 0.8–1.5)
D DIMER PPP FEU-MCNC: 1.47 UG/ML(FEU)
ERYTHROCYTE [DISTWIDTH] IN BLOOD BY AUTOMATED COUNT: 13.6 % (ref 11.9–14.6)
GLUCOSE SERPL-MCNC: 107 MG/DL (ref 65–100)
HCT VFR BLD AUTO: 37.3 % (ref 41.1–50.3)
HGB BLD-MCNC: 11.8 G/DL (ref 13.6–17.2)
MCH RBC QN AUTO: 30.7 PG (ref 26.1–32.9)
MCHC RBC AUTO-ENTMCNC: 31.6 G/DL (ref 31.4–35)
MCV RBC AUTO: 97.1 FL (ref 79.6–97.8)
NRBC # BLD: 0 K/UL (ref 0–0.2)
PLATELET # BLD AUTO: 190 K/UL (ref 150–450)
PMV BLD AUTO: 11.3 FL (ref 9.4–12.3)
POTASSIUM SERPL-SCNC: 4 MMOL/L (ref 3.5–5.1)
RBC # BLD AUTO: 3.84 M/UL (ref 4.23–5.6)
SODIUM SERPL-SCNC: 141 MMOL/L (ref 136–145)
TROPONIN I SERPL-MCNC: <0.02 NG/ML (ref 0.02–0.05)
TROPONIN I SERPL-MCNC: <0.02 NG/ML (ref 0.02–0.05)
WBC # BLD AUTO: 11.3 K/UL (ref 4.3–11.1)

## 2019-04-16 PROCEDURE — 74011000250 HC RX REV CODE- 250: Performed by: INTERNAL MEDICINE

## 2019-04-16 PROCEDURE — 71045 X-RAY EXAM CHEST 1 VIEW: CPT

## 2019-04-16 PROCEDURE — 85027 COMPLETE CBC AUTOMATED: CPT

## 2019-04-16 PROCEDURE — 74011000250 HC RX REV CODE- 250: Performed by: NURSE PRACTITIONER

## 2019-04-16 PROCEDURE — 74011250636 HC RX REV CODE- 250/636: Performed by: NURSE PRACTITIONER

## 2019-04-16 PROCEDURE — 85379 FIBRIN DEGRADATION QUANT: CPT

## 2019-04-16 PROCEDURE — 94640 AIRWAY INHALATION TREATMENT: CPT

## 2019-04-16 PROCEDURE — 36415 COLL VENOUS BLD VENIPUNCTURE: CPT

## 2019-04-16 PROCEDURE — 65660000000 HC RM CCU STEPDOWN

## 2019-04-16 PROCEDURE — 84484 ASSAY OF TROPONIN QUANT: CPT

## 2019-04-16 PROCEDURE — 80048 BASIC METABOLIC PNL TOTAL CA: CPT

## 2019-04-16 PROCEDURE — 74011250637 HC RX REV CODE- 250/637: Performed by: NURSE PRACTITIONER

## 2019-04-16 PROCEDURE — 94760 N-INVAS EAR/PLS OXIMETRY 1: CPT

## 2019-04-16 PROCEDURE — 77010033678 HC OXYGEN DAILY

## 2019-04-16 PROCEDURE — 83880 ASSAY OF NATRIURETIC PEPTIDE: CPT

## 2019-04-16 PROCEDURE — 99223 1ST HOSP IP/OBS HIGH 75: CPT | Performed by: INTERNAL MEDICINE

## 2019-04-16 RX ORDER — FUROSEMIDE 10 MG/ML
INJECTION INTRAMUSCULAR; INTRAVENOUS
Status: ACTIVE
Start: 2019-04-16 | End: 2019-04-17

## 2019-04-16 RX ORDER — ALBUTEROL SULFATE 0.83 MG/ML
2.5 SOLUTION RESPIRATORY (INHALATION)
Status: DISCONTINUED | OUTPATIENT
Start: 2019-04-16 | End: 2019-04-18 | Stop reason: HOSPADM

## 2019-04-16 RX ORDER — FUROSEMIDE 10 MG/ML
40 INJECTION INTRAMUSCULAR; INTRAVENOUS 2 TIMES DAILY
Status: COMPLETED | OUTPATIENT
Start: 2019-04-16 | End: 2019-04-17

## 2019-04-16 RX ORDER — LORAZEPAM 2 MG/ML
1 INJECTION INTRAMUSCULAR ONCE
Status: COMPLETED | OUTPATIENT
Start: 2019-04-16 | End: 2019-04-16

## 2019-04-16 RX ORDER — ALBUTEROL SULFATE 90 UG/1
2 AEROSOL, METERED RESPIRATORY (INHALATION)
Status: DISCONTINUED | OUTPATIENT
Start: 2019-04-16 | End: 2019-04-16 | Stop reason: SDUPTHER

## 2019-04-16 RX ORDER — BUDESONIDE 0.5 MG/2ML
500 INHALANT ORAL
Status: DISCONTINUED | OUTPATIENT
Start: 2019-04-17 | End: 2019-04-16

## 2019-04-16 RX ORDER — ALBUTEROL SULFATE 0.83 MG/ML
2.5 SOLUTION RESPIRATORY (INHALATION)
Status: DISCONTINUED | OUTPATIENT
Start: 2019-04-17 | End: 2019-04-16

## 2019-04-16 RX ORDER — LATANOPROST 50 UG/ML
1 SOLUTION/ DROPS OPHTHALMIC
Status: DISCONTINUED | OUTPATIENT
Start: 2019-04-16 | End: 2019-04-18 | Stop reason: HOSPADM

## 2019-04-16 RX ORDER — SODIUM CHLORIDE 0.9 % (FLUSH) 0.9 %
5-40 SYRINGE (ML) INJECTION AS NEEDED
Status: DISCONTINUED | OUTPATIENT
Start: 2019-04-16 | End: 2019-04-18 | Stop reason: HOSPADM

## 2019-04-16 RX ORDER — TAMSULOSIN HYDROCHLORIDE 0.4 MG/1
0.4 CAPSULE ORAL DAILY
Status: DISCONTINUED | OUTPATIENT
Start: 2019-04-17 | End: 2019-04-18 | Stop reason: HOSPADM

## 2019-04-16 RX ORDER — CLOPIDOGREL BISULFATE 75 MG/1
75 TABLET ORAL DAILY
Status: DISCONTINUED | OUTPATIENT
Start: 2019-04-17 | End: 2019-04-18 | Stop reason: HOSPADM

## 2019-04-16 RX ORDER — ASPIRIN 81 MG/1
81 TABLET ORAL DAILY
Status: DISCONTINUED | OUTPATIENT
Start: 2019-04-17 | End: 2019-04-18 | Stop reason: HOSPADM

## 2019-04-16 RX ORDER — BRIMONIDINE TARTRATE 2 MG/ML
1 SOLUTION/ DROPS OPHTHALMIC
Status: DISCONTINUED | OUTPATIENT
Start: 2019-04-16 | End: 2019-04-18 | Stop reason: HOSPADM

## 2019-04-16 RX ORDER — NITROGLYCERIN 0.4 MG/1
0.4 TABLET SUBLINGUAL
Status: DISCONTINUED | OUTPATIENT
Start: 2019-04-16 | End: 2019-04-18 | Stop reason: HOSPADM

## 2019-04-16 RX ORDER — FLUTICASONE FUROATE AND VILANTEROL 200; 25 UG/1; UG/1
1 POWDER RESPIRATORY (INHALATION) DAILY
Status: DISCONTINUED | OUTPATIENT
Start: 2019-04-17 | End: 2019-04-16 | Stop reason: CLARIF

## 2019-04-16 RX ORDER — PREDNISONE 5 MG/1
5 TABLET ORAL DAILY
Status: DISCONTINUED | OUTPATIENT
Start: 2019-04-17 | End: 2019-04-18 | Stop reason: HOSPADM

## 2019-04-16 RX ORDER — SODIUM CHLORIDE 0.9 % (FLUSH) 0.9 %
5-40 SYRINGE (ML) INJECTION EVERY 8 HOURS
Status: DISCONTINUED | OUTPATIENT
Start: 2019-04-16 | End: 2019-04-18 | Stop reason: HOSPADM

## 2019-04-16 RX ORDER — HYDRALAZINE HYDROCHLORIDE 20 MG/ML
10 INJECTION INTRAMUSCULAR; INTRAVENOUS
Status: DISCONTINUED | OUTPATIENT
Start: 2019-04-16 | End: 2019-04-18 | Stop reason: HOSPADM

## 2019-04-16 RX ORDER — HEPARIN SODIUM 5000 [USP'U]/ML
5000 INJECTION, SOLUTION INTRAVENOUS; SUBCUTANEOUS EVERY 8 HOURS
Status: DISCONTINUED | OUTPATIENT
Start: 2019-04-16 | End: 2019-04-18 | Stop reason: HOSPADM

## 2019-04-16 RX ORDER — FUROSEMIDE 10 MG/ML
40 INJECTION INTRAMUSCULAR; INTRAVENOUS 2 TIMES DAILY
Status: DISCONTINUED | OUTPATIENT
Start: 2019-04-16 | End: 2019-04-16 | Stop reason: SDUPTHER

## 2019-04-16 RX ORDER — BUDESONIDE 0.5 MG/2ML
500 INHALANT ORAL
Status: DISCONTINUED | OUTPATIENT
Start: 2019-04-16 | End: 2019-04-18 | Stop reason: HOSPADM

## 2019-04-16 RX ORDER — MORPHINE SULFATE 2 MG/ML
2 INJECTION, SOLUTION INTRAMUSCULAR; INTRAVENOUS
Status: DISCONTINUED | OUTPATIENT
Start: 2019-04-16 | End: 2019-04-18 | Stop reason: HOSPADM

## 2019-04-16 RX ORDER — HYDROCODONE BITARTRATE AND ACETAMINOPHEN 7.5; 325 MG/1; MG/1
1 TABLET ORAL
Status: DISCONTINUED | OUTPATIENT
Start: 2019-04-16 | End: 2019-04-18 | Stop reason: HOSPADM

## 2019-04-16 RX ADMIN — BRIMONIDINE TARTRATE 1 DROP: 2 SOLUTION OPHTHALMIC at 22:06

## 2019-04-16 RX ADMIN — NITROGLYCERIN 1 INCH: 20 OINTMENT TOPICAL at 16:44

## 2019-04-16 RX ADMIN — LORAZEPAM 1 MG: 2 INJECTION INTRAMUSCULAR; INTRAVENOUS at 14:50

## 2019-04-16 RX ADMIN — ALBUTEROL SULFATE 2.5 MG: 2.5 SOLUTION RESPIRATORY (INHALATION) at 22:22

## 2019-04-16 RX ADMIN — Medication 10 ML: at 22:06

## 2019-04-16 RX ADMIN — HYDROCODONE BITARTRATE AND ACETAMINOPHEN 1 TABLET: 7.5; 325 TABLET ORAL at 22:15

## 2019-04-16 RX ADMIN — BUDESONIDE 500 MCG: 0.5 INHALANT RESPIRATORY (INHALATION) at 22:22

## 2019-04-16 RX ADMIN — FUROSEMIDE 40 MG: 10 INJECTION, SOLUTION INTRAMUSCULAR; INTRAVENOUS at 14:39

## 2019-04-16 RX ADMIN — LATANOPROST 1 DROP: 50 SOLUTION OPHTHALMIC at 22:06

## 2019-04-16 RX ADMIN — FUROSEMIDE 40 MG: 10 INJECTION, SOLUTION INTRAMUSCULAR; INTRAVENOUS at 17:58

## 2019-04-16 RX ADMIN — MORPHINE SULFATE 2 MG: 2 INJECTION, SOLUTION INTRAMUSCULAR; INTRAVENOUS at 14:49

## 2019-04-16 NOTE — PROGRESS NOTES
Bedside and verbal shift report received from Pedro Greenfield, [facility]. Assumed care of patient.

## 2019-04-16 NOTE — CONSULTS
Office Visit     3/28/2019  [facility]   Josh Harris MD       Pulmonary Disease   Pulmonary nodule +3 more       Dx   New Patient , Lung Nodule ; Referred by Yohan Jones MD       Reason for Visit        Progress Notes               Dr. Slime Encarnacion MD  [facility] [address]. [address] [facility], [address] [facility]  [phone]     Patient Name:  Silvia Bowens  YOB: 1940     Date of Visit: 3/28/2019     CHIEF COMPLAINT:        Chief Complaint   Patient presents with    New Patient    Lung Nodule         HISTORY OF PRESENT ILLNESS:    Silvia Bowens is a 69y.o. year-old gentleman with a 150-pack-year smoking history (quit 2001), rheumatoid arthritis on prednisone 5 mg, AAA, CAD, AS s/p TAVR who was referred by Dr. Concha Tirado for evaluation of pulmonary nodule. A cardiac CT was performed on January 24th in which the radiologist noted 2 adjacent right upper lobe nodules measuring 1.4 and 1.3 cm. He recently underwent TAVR and PPM implantation placement in Feb 2019 and has a plan to undergo ophthalmologic surgery in May 2019.     The patient reports a history notable for COPD for which she has been prescribed 2 L/min of supplemental oxygen at night and currently uses Breo 200mcg. He was previously on the Anoro Ellipta inhaler but had concerns about glaucoma that prompted the switch to [facility]. His primary symptoms are shortness of breath with exertion. He rarely coughs or wheezes. Almost any exertion prompts dyspnea. He was prescribed oxygen after hospitalization for kidney stones last year and continues to use nocturnal oxygen but does not use with exertion.   He has not been hospitalized for pneumonia nor been vaccinated against.  Complete pulmonary function testing was performed in January and was suggestive of mild gold stage II COPD.          Past Medical History:   Diagnosis Date    AAA (abdominal aortic aneurysm) without rupture (HCC)       5.1 cm infrarenal abdominal aortic aneurysm; followed by=Sheila Price Ra, MD      Adverse effect of anesthesia       stopped breathing during gallbladder surgery in [facility]; had another surgery right after gallbladder surgery at [facility] and they all most lost him per patient's wife; no complications with recent surgeries in 2018 at [facility] CAD (coronary artery disease)       denies MI; no stents; hx. of CABG;    Chronic kidney disease       no nephrologist; stage 3- pt denies any kidney failure, only kidney stones    Chronic pain       due to neuropathy    COPD (chronic obstructive pulmonary disease) ([facility])       nebulizer prn; rescue inhaler prn    SANCHEZ (dyspnea on exertion)      Emphysema lung ([address]      Former smoker      GERD (gastroesophageal reflux disease)       tums prn     Heart murmur       \"I was diagnosed at long time ago\";     Hemorrhoid      High cholesterol       no meds    History of kidney stones       multiple-surgical intervention and naturally pass    History of sepsis 10/2018     related to kidney stone    History of skin cancer      Hx of CABG 2005    Hypertension       was taken off BP meds when septic in 10/2018 and hasn't taken since     Long term (current) use of systemic steroids       Prednisone    Lung nodule      MRSA (methicillin resistant staph aureus) culture positive       positive nasal swab collected on PAT     MSSA (methicillin susceptible Staphylococcus aureus)       positive MSSA nasal swab collected at PAT    Nephrolithiasis      Neuropathy       feet and hands    On home oxygen therapy       2L/NC at bedtime and during the day prn for sob     RA (rheumatoid arthritis) ([address]       managed with Remicade    Severe aortic stenosis      Tattoo       Left FA               Past Surgical History:   Procedure Laterality Date    HX APPENDECTOMY   13years old    HX CATARACT REMOVAL Bilateral      HX CHOLECYSTECTOMY        HX COLONOSCOPY        HX CORONARY ARTERY BYPASS GRAFT   2005?     3 vessel    HX HEART CATHETERIZATION   05/06/2014     no stents     HX HEENT Right 04/2018     eye surgery for glaucoma     HX UROLOGICAL   10/24/2018     CYSTOURETHROSCOPY,W/URETEROSCOPY/PYELOSCOPY; W/LITHOTRIPSY INCLUDING INSERTION INDWELLING URETERAL STENT** CYSTOSCOPY, BILATERAL URETEROSCOPY, LASER LITHOTRIPSY, BILATERAL RETROGRADE PYELOGRAM, WITH BILATERAL URETERAL STENT EXCHANGE**    HX UROLOGICAL   10/03/2018     CYSTOURETHROSCOPY, W/INSERTION INDWELLING URETERAL STENT (C-ARM)    HX UROLOGICAL   09/19/2018     OH CYSTO/URETERO W/LITHOTRIPSY &INDWELL STENT INSRT           Social History            Socioeconomic History    Marital status:        Spouse name: Not on file    Number of children: Not on file    Years of education: Not on file    Highest education level: Not on file   Occupational History    Not on file   Social Needs    Financial resource strain: Not on file    Food insecurity:       Worry: Not on file       Inability: Not on file    Transportation needs:       Medical: Not on file       Non-medical: Not on file   Tobacco Use    Smoking status: Former Smoker       Packs/day: 3.00       Years: 50.00       Pack years: 150.00       Types: Cigarettes       Last attempt to quit: 1/3/2004       Years since quitting: 15.2    Smokeless tobacco: Never Used   Substance and Sexual Activity    Alcohol use:  No       Frequency: Never    Drug use: No    Sexual activity: Not on file   Lifestyle    Physical activity:       Days per week: Not on file       Minutes per session: Not on file    Stress: Not on file   Relationships    Social connections:       Talks on phone: Not on file       Gets together: Not on file       Attends Shinto service: Not on file       Active member of club or organization: Not on file       Attends meetings of clubs or organizations: Not on file       Relationship status: Not on file    Intimate partner violence:       Fear of current or ex partner: Not on file       Emotionally abused: Not on file       Physically abused: Not on file       Forced sexual activity: Not on file   Other Topics Concern    Not on file   Social History Narrative    Not on file               Family History   Problem Relation Age of Onset    Kidney Disease Mother      Diabetes Mother      Heart Attack Father      Liver Disease Sister      Heart Disease Brother      Heart Attack Brother                 Allergies   Allergen Reactions    Methotrexate Other (comments)       \"hemorrhage\" per wife              Current Outpatient Medications   Medication Sig    OXYGEN-AIR DELIVERY SYSTEMS by Does Not Apply route. 2lpm on excertion    umeclidinium-vilanterol (ANORO ELLIPTA) 62.5-25 mcg/actuation inhaler Take 1 Puff by inhalation daily.  clopidogrel (PLAVIX) 75 mg tab Take 1 Tab by mouth daily.  amLODIPine (NORVASC) 10 mg tablet Take 1 Tab by mouth daily.  aspirin delayed-release 81 mg tablet Take 81 mg by mouth daily.  predniSONE (DELTASONE) 5 mg tablet Take 5 mg by mouth daily.  infliximab (REMICADE IV) by IntraVENous route. Every 7-8 weeks; last infusion 12/19/18    travoprost (TRAVATAN Z) 0.004 % ophthalmic solution Administer 1 Drop to left eye nightly.  brimonidine (ALPHAGAN P) 0.1 % ophthalmic solution Administer 1 Drop to left eye nightly.  fluticasone-vilanterol (BREO ELLIPTA) 200-25 mcg/dose inhaler Take 1 Puff by inhalation daily. .  Indications: Controller Medication for Asthma    albuterol (PROVENTIL VENTOLIN) 2.5 mg /3 mL (0.083 %) nebulizer solution 2.5 mg by Nebulization route three (3) times daily as needed for Wheezing or Shortness of Breath. Take / use AM day of surgery  per anesthesia protocols if needed.  tamsulosin (FLOMAX) 0.4 mg capsule Take 0.4 mg by mouth daily.  albuterol (PROAIR HFA) 90 mcg/actuation inhaler Take 2 Puffs by inhalation every four (4) hours as needed for Wheezing or Shortness of Breath. Take / use AM day of surgery  per anesthesia protocols.  cholecalciferol (VITAMIN D3) 1,000 unit tablet Take 1,000 Units by mouth daily.  acetaminophen (TYLENOL) 325 mg cap Take 1,000 mg by mouth nightly as needed for Pain.  HYDROcodone-acetaminophen (NORCO) 7.5-325 mg per tablet Take 1 Tab by mouth nightly as needed for Pain.      No current facility-administered medications for this visit.          Review of Systems   Constitutional: Positive for malaise/fatigue. Negative for chills, diaphoresis, fever and weight loss. HENT: Positive for hearing loss. Negative for congestion, ear discharge, ear pain, nosebleeds, sinus pain, sore throat and tinnitus. Eyes: Positive for double vision. Negative for blurred vision, pain and redness. Respiratory: Positive for shortness of breath and wheezing. Negative for cough, hemoptysis and sputum production. Cardiovascular: Positive for leg swelling. Negative for chest pain, palpitations, orthopnea and PND. Gastrointestinal: Positive for constipation. Negative for abdominal pain, blood in stool, diarrhea, heartburn, melena, nausea and vomiting. Indigestion   Genitourinary: Positive for frequency. Negative for dysuria, hematuria and urgency. Musculoskeletal: Positive for back pain. Negative for joint pain, myalgias and neck pain. Skin: Positive for itching. Negative for rash. Neurological: Negative for dizziness, tremors, focal weakness, seizures and headaches. Endo/Heme/Allergies: Negative for environmental allergies. Bruises/bleeds easily. Psychiatric/Behavioral: Positive for memory loss. Negative for depression, hallucinations and suicidal ideas.  The patient is not nervous/anxious.             PHYSICAL EXAM:      Vitals:     03/28/19 1020   BP: 158/60   Pulse: 78   Resp: 16   Temp: 99.5 °F (37.5 °C)   TempSrc: Temporal   SpO2: 100%  Comment: r/a   Weight: 187 lb (84.8 kg)   Height: 5' 10.5\" (1.791 m)      Body mass index is 26.45 kg/m².        General Appearance: The patient is pleasant and in no respiratory distress. HEENT: PERRLA. Conjunctivae unremarkable. Nasal mucosa is without epistaxis, exudate, or polyps. Gums and dentition are unremarkable. There is no oropharyngeal narrowing. TMs are clear. Neck/Lymphatic:  Symmetrical with no elevation of jugular venous pulsation. Trachea midline. No thyroid enlargement. No cervical adenopathy. Lungs:  Normal respiratory effort with symmetrical lung expansion. Breath sounds diminished bilaterally. Heart: Regular rate and rhythm  Abdomen:  Soft and non-tender. No hepatosplenomegaly. Bowel sounds are normal.    Extremity: Trace bilateral lower extremity edema, no clubbing  Musculoskeletal:  No weakness, normal muscle tone and bulk. Neuro: The patient is alert and oriented to person, place, and time. Memory appears intact and mood is normal.  No gross sensorimotor deficits are present.     DIAGNOSTIC TESTS:     Spirometry: Mild Gold stage I COPD, could likely tolerate RU lobectomy if needed.       1/24/2019 3/28/2019     FVC  3.35 (80%)  3.68 (87%)     FEV1  1.93 (71%)  2.16 (71%)     FEV1/FVC  0.58  0.59     TLC  5.89 (92%)       FRC  3.49 (95%)       RV  2.49 (93%)       DLCO  11.4 (57%)          Exercise oximetry:    CT coronaries 1/24/19      INTERPRETATION:       Coronary Angiography:      1. The left main arises from the left sinus of Valsalva and has high-grade  calcified plaque with 80% stenosis. .  2. The left anterior descending branches from the left main and is occluded in  the proximal segment. The ramus intermedius has a 50% narrowing. .  3. The left circumflex branches from the left main and is occluded. 4. The dominant right coronary artery arises from the right sinus of Valsalva  and is patent, giving rise to a PDA and PLB. .     1. The LIMA to the LAD is patent. 2. The vein graft to the obtuse marginal is patent.   3. The vein graft to the ramus intermedius is patent.     Left Ventricle: Normal size, LVH. [place] Left Atrium: Moderately enlarged; there are 3 right pulmonary veins and 2 left  pulmonary veins. The left atrial appendage is free of thrombus. There is a small  PFO. .  Right Ventricle: Normal size. Right Atrium: Normal size. [place] Pericardium: Normal..  Aorta: Normal dimensions with moderate plaque. Pulmonary Artery: Normal dimensions without filling defects. Non aortic valves: Moderate mitral annular calcification. Mediastinum / Lung Fields: Emphysematous changes with nodules in the right upper  lobe. See radiology over read. Please see radiology over read.       Aorto-annular complex:     1. The aortic annulus is best visualized in the 35% phase interval.       Area: 467 sq mm sq mm. Minimum diameter: 20.4 mm, maximum diameter: 27.9      Equivalent diameter: 24.4 mm. Perimeter: 79.1 mm.      Annulus calcification: None. Aortic valve calcification: Moderate  2. LVOT area: 474 sq mm.      LVOT calcification: None. 3. SOV diameters: right 32.8 mm, left 33.5 mm, non 34.0 mm.  4. SOV heights:  non 24.9 mm.  5. STJ diameter: 29.5 x 30.4 mm. 6. Ascending aorta dimensions: 32.6 x 32.7 mm.      Calcification: Moderate. 7. Left main height: 10.1 mm. RCA height: 17.0 mm.  8. Coaxial planes: JAY 2, caudal 3.     IMPRESSION:      1. Severe aortic stenosis. Annulus area: 467 sq mm LVOT area: 474 sq mm  2. LM height: 10.1 mm. RCA height: 17.0 mm.  3. Recommend size 26 valve  4. Severe native coronary artery disease with 3 out of 3 patent grafts and a  patent RCA. 5. Small PFO.     Linda Stanford MD     AMBULATING OXIMETRY: 3/28/2019 O2 sat HR   Room air at Rest  100 %  102 bpm   Room air ambulating  93 %  122 bpm         ASSESSMENT & PLAN:  (Medical Decision Making)  José calculator suggests risks of this nodule are as high as 29%, intermediate risk category. Rheumatoid nodule also possible.   Intermediate risk category requires further workup including PET and/or biopsy.         ICD-10-CM ICD-9-CM     1. Pulmonary nodule:  PET- CT needed and will arrange. Malignancy risk is intermediate and there is also rheumatoid nodule in the differential diagnosis. We discussed options of biopsy vs directly to resection depending upon PET result. R91.1 793.11     2. Tobacco use: increasing likelihood of malignancy. Z72.0 305.1 AMB POC SPIROMETRY   3. Encounter for immunization: Prevnar 13 administered today Z23 V03.89 PNEUMOCOCCAL CONJ VACCINE 13 VALENT IM   4. Stage 2 moderate COPD by GOLD classification ([facility] [address] J44.9 496 umeclidinium-vilanterol (ANORO ELLIPTA) 62.5-25 mcg/actuation inhaler   The patient was educated about the pathophysiology of COPD. We also discussed several treatment concepts listed below:  1. Smoking cessation-the vital impact of smoking cessation/abstinence was emphasized. 2. Pneumococcal vaccination (Pneumovax and [id number]) as well as influenza vaccination were recommended. 3. Supplemental oxygen and its role in hypoxemia were discussed. Ambulating oximetry today  4. Alpha-1 antitrypsin testing was recommended and was performed. 5.  Pulmonary rehab and its benefits improving exercise capacity were discussed. Will refer. 6.  Bronchodilator therapy- Educated the patient about the role of bronchodilators for long-term improvement in symptoms, exercise capacity and airflow limitation. The specific uses of short-term & long-term bronchodilators were reviewed, as well as the practical importance of choosing agents covered by insurance (large cost difference).   Educational materials were provided to reinforce what was learned today.        REFERRAL TO PULMONARY REHAB   Recommend Anoro and prn albuterol     AMB POC PULSE OXIMETRY, MULTIPLE         ALPHA-1-ANTITRYPSIN, TOTAL      F/u in one month at [facility] with NP.         Orders Placed This Encounter    AMB POC SPIROMETRY    PET/CT TUMOR IMAGE SKULL THIGH W (INI)    PNEUMOCOCCAL CONJ VACCINE 13 VALENT IM (Age 48 and over)    ALPHA-1-ANTITRYPSIN, TOTAL    REFERRAL TO PULMONARY REHAB    AMB POC PULSE OXIMETRY, MULTIPLE    umeclidinium-vilanterol (ANORO ELLIPTA) 62.5-25 mcg/actuation inhaler                     Judy Valle MD  Electronically signed                   Date of Surgery Update:  Virgina Olszewski was seen and examined. History and physical has been reviewed. Significant clinical changes have occurred as noted:    Patient admitted for decompensated CHF- we are asked to see for his PET CT findings. As above patient is already established and being worked up for a suspected lung cancer. PET CT with pet positive RUL nodule and he will ultimately need a Navigational biopsy and EBUS staging which will be scheduled as OP. His surgical candidacy will be limited by his cardiac problems most likely and I doubt he will be a good surgical candidate based on that. He should however be a good candidate for cyber knife Rx and will need fiducial marker placement for that purpose which can be done during navigational bronchoscopy. His case is to be discussed in tumor board tomorrow. Visit Vitals  /76   Pulse (!) 110   Temp 98.2 °F (36.8 °C)   Resp (!) 48   SpO2 96%     Gen- the patient is well developed and in mil;d respiratory distress, orthopneic. HEENT- pupils equal round and reactive to light, extraocular muscles intact, no scleral icterus       nose without alar flaring or epistaxis                  oral mucosa moist without cyanosis  Neck- the is mild   jugular venous distension   Lungs- crackles  are evident bilaterally on both bases. There are no intercostal retractions. Heart- regular rate and rhythm tachycardic no S3. Abd- soft and non-tender, unremarkable bowel sounds  Ext- warm without cyanosis. There is 3+ pitting  edema. Skin- no jaundice or rashes  Neuro- alert and oriented x 3. No gross sensorimotor deficits are present.      Plan :    Hospital Problems  Date Reviewed: 4/16/2019          Codes Class Noted POA    SOB (shortness of breath) ICD-10-CM: R06.02  ICD-9-CM: 786.05  4/16/2019 Yes    Due to CHF  Diurese  CXR      Hypoxia ICD-10-CM: R09.02  ICD-9-CM: 799.02  4/16/2019 Yes    Supplement O2 to keep O2 sats >90%    * (Principal) Volume overload ICD-10-CM: E87.70  ICD-9-CM: 276.69  4/16/2019 Unknown        Lung nodule (Chronic) ICD-10-CM: R91.1  ICD-9-CM: 793.11  3/21/2019 Yes    Suspect primary early lung cancer - see discussion above- this is already being worked up    S/P TAVR (transcatheter aortic valve replacement) (Chronic) ICD-10-CM: Z95.2  ICD-9-CM: V43.3  2/12/2019 Yes    Overview Signed 2/12/2019  3:07 PM by Kaylan Chiang MD     02/2019:  Ashlyn Mcdonough Mitchell 3 AVR             Hypertension (Chronic) ICD-10-CM: I10  ICD-9-CM: 401.9  2/7/2019 Yes    Overview Signed 2/7/2019  2:42 PM by Kaylan Chiang MD     was taken off BP meds when septic in 10/2018 and hasn't taken since              Coronary artery disease involving native coronary artery of native heart without angina pectoris ICD-10-CM: I25.10  ICD-9-CM: 414.01  1/4/2019 Yes    Overview Signed 2/25/2019 10:06 AM by Kaylan Chiang MD     01/2019:  Stable CA\D with Patent LIMA-LAD, SVG-RI, SVG-OM. COPD (chronic obstructive pulmonary disease) ([facility] [address] ICD-10-CM: J44.9  ICD-9-CM: 221  1/4/2019 Yes    Seems non exacerbated- there is no wheezing at present and his symptoms were of gradual onset over a weeks period. Thank you for this consultation.                  Signed By: Marshall Cruz MD     April 16, 2019 3:11 PM

## 2019-04-16 NOTE — PROGRESS NOTES
Pt directly admitted from office by Dr. Maria M Gil. S/p TAVR in Feb with worsening lower extremity edema, increased SOB. Recent PET scan suspicious for cancer. Hypoxic in office. Resp rates in 40's now, anxious, but ox sat 97 %. Stat labs, with d dimer obtained. Given IV lasix 40 mg acutely. Monitor closely. Pulmonary consulted.

## 2019-04-16 NOTE — PROGRESS NOTES
Verbal bedside report given to nicholas Chen RN. Patient's situation, background, assessment and recommendations provided. Kardex, Mar, and recent results also reviewed. Opportunity for questions provided. Oncoming RN assumed care of patient.

## 2019-04-16 NOTE — ROUTINE PROCESS
Respiratory Care Services     Policy Number: -[id number]    Title: Aerosolized Medication Protocol    Effective Date: 10/1998    Revised Date: 06/2013, 03/2016    Reviewed Date: 05/2014/ 03/2015 , 06/2017       I. Policy: The Aerosolized Medication Protocol shall by implemented by Respiratory Care              Practitioners (RCP) for patients with orders to receive aerosol therapy with medication. II. Purpose: To open and maintain obstructed airways, the RCP, will utilize the following   protocol to select the indicated aerosolized medication(s) and determine the most effective method of delivery to the patient. III. Patient Type: All patients who are determined to meet aerosolized medication criteria as          outlined in this protocol. IV. Responsibility: Director, [address], registered Respiratory Care                                                     Practitioners (RCPs) with documented competency in the performance of                                     respiratory therapeutic techniques. V. Equipment needed:  A. Stethoscope  B. Pulse oximeter  C. IPPB machine and circuit  D. Aerosol nebulizer  E. MDI Inhaler     VI. Protocol:   A. The following conditions are accepted indications for aerosolized medication therapy. 1. Bronchospasm/wheezing  2. Impaired mucociliary clearance  3. Tracheobronchial mucosal congestion/and laryngeal stridor  4. Diseases which commonly require aerosolized medication therapy include, but are not limited to:  a. Asthma/reactive airway disease  b. Bronchitis/emphysema (COPD)  c. Cystic fibrosis  d. Severe laryngitis/tracheitis  e. Bronchiectasis  f. Smoke inhalation or chemical trauma to the lung or upper airway  g. Physical trauma to the upper airway  h. Laryngotracheobronchitis  i. Bronchiolitis  j. Non-specific wheezing              B. Indications for bronchodilator medications will include:  a.  Bronchospasm/ wheezing  b. Asthma/reactive airway disease  c. Chronic obstructive pulmonary disease  d. Obstructive defect on pulmonary function testing  C. Administration of medications  1. If a bronchodilator or any other type of respiratory medication is needed, a physician order must be indicated in the medication section in the patients EMR. 2. When the physician specifies the medication and dosage at the time of request, the ordered medication will be used as part of the care plan. D. The following guidelines will be utilized in the evaluation and selection of the         appropriate delivery device for indicated medication(s):  1. IPPB  a. Ventilation is inadequate (rapid shallow breathing)  b. Refractory atelectasis has developed, other forms of therapy are unsuccessful  c. Inability to clear secretions  d. Need to improve lung expansion  2. Unassisted aerosol (UA) is the preferred method of aerosol delivery and indicated if  a. Ventilation is inadequate  b. Patient demonstrates wheezing   c. patient is unable to perform MDI effectively   d. Patient preference  3. Metered Dose Inhaler (MDI)   a. Patient is alert/cooperative  b. Medication(s) available in this delivery method. c. Able to perform 3 second breath hold. d. Patient has demonstrated ability to use MDI effectively  e. Patient has used MDI therapy previously, either at home or in the hospital.  f. Note: The only approved inhalers on formulary are albuterol and Spiriva. VII. Guidelines:   Monitor patients vital signs and evaluate patients clinical status. The need to change medication and/or modality may be indicated by:  1. A pulse greater than 120 bpm, or if a pulse increase of 20 bpm occurs with bronchodilator medications. 2. Significant worsening of dyspnea or wheezing occurring during or within 30 minutes of discontinuing therapy.   3. Worsening of patients sensorium (e.g. patient becomes confused or obtunded, and unable to follow directions). 4. Worsening of patients chest x-ray. 5. Change in sputum (e.g. increased pulmonary infiltrate, which might indicate need for volume expansion therapy). 6. Patient has difficulty coughing up secretions, which might indicate need for acetylcysteine and/or bronchial hygiene therapy. 7. Call physician immediately if dyspnea worsens and is not responsive to modifications allowed by protocol. VIII. Clinical Responsibility:  A. The therapy assessment guidelines will be used to evaluate all patients receiving aerosolized medications with the exception of critical care areas. 1. RCPs will perform changes in therapy per protocol. 2. It will be the responsibility of RCP to provide instruction regarding respiratory medications, aerosol therapy and proper MDI technique, as well as, spacer usage to patients ordered MDI therapy. 3. Current therapy that is part of a patients home regimen will not be discontinued. IX. Documentation  A. Document assessment findings in the respiratory assessment section of the patients EMR. B. Document changes in therapy per protocol in the respiratory orders section and in the care plan section of the patients EMR. C. Document patient education in the patient education section of the patients EMR. X. Outcome Criteria:  A. Relief of wheezes and obstruction  B. Improved cough and sputum color and consistency  C. Improved chest x-ray  D. Improved arterial oxygen tension and or SaO2  E. Improved Peak Flow on asthmatic patients        XI. Related Protocols:  A. Respiratory Patient Care Protocols  B. Bronchial Hygiene Therapy  C.  Oxygen Protocol    Reference:                          Respiratory Care Services     Policy Number: -[id number]    Title: Patient Care Assessment Program    Effective Date: 01/1999    Revised Date: 05/2014    Reviewed Date: 06/2013/ 03/2015, 03/2016, 06/2017     Overview  In an effort to improve quality and reduce costs of respiratory care at [facility], the Respiratory Department has developed a number of Patient Care Protocols. These protocols have been developed according to Richa 3 and are utilized for those patients who are ordered respiratory therapy using therapeutic indications and standardized approaches for accomplishing objectives. Patient Care Protocols are intended to improve care by:   Defining the indications and standards of care agreed upon by the Pulmonary Medicine and [address] [place] [address] of [facility].  Training respiratory care practitioners to apply those criteria to individual patients and modify therapy as indicated by the protocols.  Documenting the indication and care plan as part of the initial ordering process.  Tapering or discontinuing treatments once the indication for therapy changes. The Patient Care Protocols shall be universally applied throughout the hospital as determined by   the Pulmonary Medicine and [address] [place] [address]. Rationale for Patient Care Assessment Protocols:   Continuous Quality Improvement   Cost containment   Standardization of care   Enhanced continuity of care   Utilization review   Timely intervention    The following patient care assessment protocols have been developed:   Aerosolized Medication    Bronchial Hygiene    Oxygen Weaning Protocol   Volume Expansion/Secretion Clearance Non-Vented   Ventilator Weaning Protocol   CVRU Fast Track Weaning Protocol   Asthma Treatment Protocol ER   Pediatric Asthma Treatment Protocol ER   Alpha-1 Antitrypsin Deficiency Protocol   Prone Positioning Protocol    The Director of Respiratory Care Services oversees the Patient Care Assessment Program. The Clinical/ is responsible for protocol development and training. The Supervisor is responsible for implementation and  activities.      Each patient with an order for respiratory treatments will receive an evaluation. All Registered Respiratory Care Practitioners (RCPs) will perform the evaluations. The same evaluation tool will be utilized for all initial and follow-up assessments. If the patient does not meet criteria for ordered therapy, the therapy will be discontinued. If the patient demonstrates an adverse response to initially ordered therapy, the therapy will be discontinued and the physician will be contacted. Specific physician's orders that deviate from protocols and are deemed \"inappropriate\" or \"unsafe\" will be addressed with ordering physician and/or medical director as required. Patients of [facility] and [address] will not be assessed for the first 24 hours as the Medical Director of [address] [place] [address] has requested that changes in therapy should not be made during that time frame. Respiratory Patient Care Assessment Protocols                           I.  Policy: In an effort to provide quality patient care and effective utilization of services, physicians who order respiratory therapy will have their patients treated via the protocols established (see attached). All Registered [facility] [address] (RCPs) will complete the initial assessment. This assessment will indicate patient needs, and the care plan developed for the patient and will performed within 24 hours of admission. Frequency of the therapy will be set according to the results of the respiratory therapy evaluation and frequency guidelines policy. Reassessment will be continued done every 48 hours and more frequently as needed for the individual patient. II. Purpose:     A. To provide a process that will allow for ongoing assessment and care plan modification for patients receiving respiratory services based on both objective and or subjective patient responses to interventions.  This process of protocol utilization will assist in patient care progression while eliminating the need for the physician to continually update respiratory therapy orders. B. To assure continuity of respiratory care that meets [facility] Clinical Practice Guidelines. III. Initiation:  Implement Respiratory Care Protocols for patients who are ordered by physician          to receive respiratory therapy procedures or for ventilator management. IV. Protocol:  A. Upon receiving an order for therapy the RCP will review the patients EMR (electronic medical record) for all pertinent information includin. Physicians order for therapy  2. Patient history and physical examination  3. Physician progress notes  4. Diagnostic. X-rays, PFTs, arterial blood gases etc.      B. The RCP will perform a respiratory assessment in the following manner:  1. Perform hand hygiene per hospital policy utilizing Standard Precautions for all patients and following transmission-based isolation as indicated per policy. 2. Identify patient via ID bracelet verifying patient name and birth date. 3. General observations: color, pattern and effort of breathing, chest expansion, (symmetrical and bilateral), level of consciousness and the ability to ambulate. 4. The RCP will assess patients cough ability and determine if Nasotracheal suctioning is needed. If patient is unable to produce sputum, at that time, the RCP should question the patient with regard to their sputum: production, color consistency, frequency and amount. 5. Auscultation: Using a stethoscope, the RCP will listen and note quality of breath sounds and presence or absence of adventitious breath sounds in all lung fields, both anteriorly and posteriorly. 6. Upon completing the EMR review and physical assessment, the RCP will document findings in the RT Assessment section of the EMR. The score level will be provided and will be used to determine the frequency of therapy.     V. Indications:   A. Indications for Bronchial Hygiene Protocol will include:  1. Potential for or presence of atelectasis. 2. Need for hydration and removal of retained secretions. 3. Need for improvement of cough effectiveness. 4. Presence of conditions associated with disorder of pulmonary clearance:  a. Cystic fibrosis  b. Bronchiectasis  B. Indications for Aerosolized Medication(s) Protocol should include:  1. Treatment of bronchospasm/wheezing  2. Improvement of mucociliary clearance  3. Treatment of stridor  4. History of Asthma or COPD             C.  Indications for Oxygen Therapy Protocol should include:  1. Documented hypoxemia  2. Severe trauma  3. Acute myocardial infarction  4. Short-term therapy (e.g. post anesthesia recovery)    VI. Maintenance:    A. Timely patient assessment is an integral part of this protocol therefore the following will be applied:  1. All non- critical care patients will be evaluated upon receiving initial respiratory care orders within 24 hours and re-evaluated within 48 hours (or more as needed). 2. Orders requesting a Respiratory Consult will be responded to in the following manner:  a. In patient emergency situations, the RCP assigned to the floor will respond immediately to the patient, provide an initial respiratory assessment, and contact the patients physician as necessary for appropriate orders. b. In non-emergent situations, the RCP assigned to the floor will respond to the patient within 90 minutes and provide an initial respiratory assessment and contact patients physician as necessary for appropriate orders. c. An RCP will provide a comprehensive assessment as soon as possible. 3. Upon completion of an evaluation, the RCP will complete documentation in the patients EMR in the RT Assessment section. 4. The RCP who completes the assessment will document orders for therapy in the orders section of the patients EMR selecting new order.  Next, per protocol should be selected indicating it is a protocol order and sign orders should be selected to complete the process. 5. The Pharmacy and Therapeutics (P&T) Committee has mandated that the medication Xopenex may be changed to unit dose albuterol without an order, except for those patients receiving Xopenex due to cardiac arrhythmias. The dosage for these patients should be 0.63 mg. and may be changed from 1.25 mg. to 0.63 mg per P & T Committee by the RCP completing the assessment. 6. Patients who are not experiencing cardiac arrhythmias, and are ordered Xopenex and Atrovent may be changed to Duoneb. VII. Safety:        A. The following safety issues shall be monitored:  1. The RCP will perform hand hygiene per hospital policy utilizing Standard Precautions for all patients and following transmission-based isolation as indicated per hospital policy. 2. The RCP must exercise professional judgment in classifying the patient for frequency of therapy. 3. Appropriate classification of the patient will require an evaluation utilizing the Therapy Assessment Protocol Guidelines. 4. The RCP will confer with the physician concerning the care of the patient at any time questions or problems arise. 5. If during therapy, the patient exhibits no improvement or deterioration in clinical status the RCP will notify the physician and the patients nurse. VIII. Interventions:   A. The patients nurse is responsible concerning all items related to his/her care. Ongoing communication with nursing is essential to successful protocol management. B. The RCP recognizes the value of the team approach in meeting the patients needs. Nursing input regarding the patients pulmonary condition will be sought as needed. IX. Reportable conditions: The RCP will inform the physician if:  1. There are acute changes in patients respiratory status. 2. The therapist is unable to determine appropriate care plan upon assessment.   3. The patient fails to reach therapeutic objective. 4. A change or additional medication is needed. X.  Patient Education:    A. Patient will receive instruction on the followin. The treatment modality, including objectives and proper technique of therapy  2. Respiratory medications  B. Documentation shall occur in the patient education section of the patients EMR. XI. Documentation: Record all findings as described above in the patients EMR. Related Protocols: A. Aerosolized Medication Protocol  B. Bronchial Hygiene  C. Oxygen Protocol   D. Volume Expansion/Secretion Clearance  E. Ventilator Weaning Protocols    References:  N   Joint Commission Consolidated [place] Standard   L    Respiratory Care Department Policy, Procedure and Protocol Guideline Manual, , CEE Sanchez. L  Therapist Driven Respiratory Care Protocols - A Practitioners Guide for Criteria-Based Respiratory Care by Ellis Pereyra M.D., and CEE Amos, RRT. L  The rationale for therapist-driven protocols: an update. Respiratory Care 1998; [facility] Guidelines. Respiratory Care Services     Policy Number: -[id number]    Title: Oxygen Protocol    Effective Date: 1996    Revised Date: 2013, 2016    Reviewed Date: 2014, 2015, 2017     I. Policy: The Oxygen Protocol will be initiated for all patients upon written order from physician for administration of oxygen therapy or if patient is found to have an oxygen saturation of 88% or less. II. Purpose: To provide protocol driven respiratory therapy for the administration of oxygen at concentrations greater than that in ambient air with the intent of treating or preventing the symptoms and manifestations of hypoxia. III. Responsibility: Director Respiratory Care Services, all Respiratory Care Practitioners     IV. Indications:   A.  Implement this protocol for all patients when physician orders oxygen to be administered or when patient is found to have an oxygen saturation of 88% or less. B. To assure routine monitoring of patient's oxygen saturation b.i.d. and to make appropriate adjustments in accordance with ordered oxygen saturation parameters. C. To assure continuity of respiratory care that meets [facility] Clinical Practice Guidelines. V. Assessment:  Assess the following parameters to determine the need to adjust oxygen:  A. Measurement of patient's oxygen saturation via pulse oximetry. B. Observation of patient's color, respiratory effort, and responsiveness. C. Measurement of heart rate and respiratory rate. VI. Initiation:  Upon receipt of an order for oxygen, the RCP will:   A. Verify order in the patient's EMR, which should include the desired oxygen saturation to be maintained. B. In the event that no saturation is specified, a saturation of 90% will be maintained for all patients with the exception of cardiac patients who will be maintained at 92%. C. The patient will be placed on oxygen with humidity as ordered by the physician to achieve the prescribed oxygen saturation. D. Patients, who are found to have a SaO2 of 88% or less, may be started on supplemental oxygen as described above. E. The patient will be informed of the \"no smoking policy\" and instructed in the proper use of oxygen therapy. F. Once desired oxygen saturation has been achieved, the RCP will document FIO2 and oxygen saturation in the respiratory section of the patients EMR. VII. Maintenance:   A. 30-second oxygen saturation check will be taken to maintain the saturation ordered by the physician each day. B. Patients will be assessed each shift by pulse oximetry to determine if oxygen needs to be decreased, increased or discontinued. C. If changes in FIO2 are indicated, all changes will be documented in the respiratory section of the patients EMR.    D. If no changes in FIO2 are required, the patient's oxygen flow rate and saturation will be recorded in the respiratory section of the patients EMR. E. Per [facility], patients who are receiving oxygen therapy but are not on oxygen at home, should be weaned off oxygen as soon as possible or when anticipated discharge becomes evident. Raymondo Snare will be discontinued after oxygen saturation has been maintained for 24 hours on room air and documented in the patients EMR. G. Patients on the Inpatient Rehabilitation area on 9th floor will be exempt from having their oxygen discontinued per protocol. Oxygen may be weaned but will be changed to prn to meet the needs of the patient when exercising and participating in physical therapy. VIII. Safety: RCP will address the following safety issues:  A. Identify patient using the two patient identifiers name and birth date via ID bracelet. B. Perform hand hygiene per hospital policy utilizing Standard Precautions for all patients and following transmission-based isolation as indicated per hospital policy. C. For cardiac patients, oxygen will be discontinued by order of physician. D. If a patients FIO2 requirements necessitate changing oxygen delivery devices to a high concentration of oxygen, the ordering physician will be notified. E. If a patient has a hemoglobin level <8 mg. RCP will consult physician before discontinuing oxygen. F. Patients who have an oxygen saturation >95% may be weaned by increments of 10%. G. Patients who have an oxygen saturation <95% may be weaned by increments of 5%. IX. Interventions:   A. RCP will assess patient for signs of respiratory distress or suspicion of CO2 retention. B. An ABG may be obtained for patients exhibiting respiratory distress. C. An order should be entered into patients EMR for ABG under per protocol. X. Documentation  D. Document assessment findings in the respiratory section of the patients EMR.   E. Document changes in therapy per protocol in the respiratory orders section and in the care plan section of the patients EMR. F. Document patient education in the patient education section of the patients EMR. XI. Reportable Conditions:  Report to the physician immediately:  A. Acute changes in patient's respiratory status. B. An oxygen saturation <85%. C. A change in oxygen delivery device to provide a high concentration of oxygen. XII. Patient Instructions: Review with Patient  A. Purpose of oxygen therapy  B. Proper technique for using oxygen  C.  No smoking policy    Approval: Pulmonary Committee (1-25-96)  Revision: Chest Committee (4-28-05)

## 2019-04-16 NOTE — PROGRESS NOTES
Patient received to room 326 as direct admit. Pt was ambulatory from lobby carrying is own O2 tank. Pt is obvious respiratory distress breathing 40-50 times/min. Rasheed Houston NP at the bedside to assist. Wheezes and crackles in all lung fields. 4L O2 continued, although SPO2 was 97%. IVP lasix, morphine, and ativan given to help breathing and anxiety. With these interventions patient does begin to look better. When patient more stable, Patient oriented to room, call light and plan of care. Admission assessment completed. Admission skin assessment completed with second RN and reveals the following: Intact sacrum. Tattoos present. BLE edema with severely hypersensitive sensation.

## 2019-04-17 PROBLEM — N18.30 STAGE 3 CHRONIC KIDNEY DISEASE (HCC): Status: ACTIVE | Noted: 2019-04-17

## 2019-04-17 PROBLEM — I70.1 ATHEROSCLEROTIC RAS (RENAL ARTERY STENOSIS), BILATERAL (HCC): Status: ACTIVE | Noted: 2019-04-17

## 2019-04-17 LAB
ANION GAP SERPL CALC-SCNC: 9 MMOL/L (ref 7–16)
BUN SERPL-MCNC: 26 MG/DL (ref 8–23)
CALCIUM SERPL-MCNC: 7.8 MG/DL (ref 8.3–10.4)
CHLORIDE SERPL-SCNC: 106 MMOL/L (ref 98–107)
CHOLEST SERPL-MCNC: 140 MG/DL
CO2 SERPL-SCNC: 27 MMOL/L (ref 21–32)
CREAT SERPL-MCNC: 2.09 MG/DL (ref 0.8–1.5)
ERYTHROCYTE [DISTWIDTH] IN BLOOD BY AUTOMATED COUNT: 13.3 % (ref 11.9–14.6)
GLUCOSE SERPL-MCNC: 95 MG/DL (ref 65–100)
HCT VFR BLD AUTO: 32.3 % (ref 41.1–50.3)
HDLC SERPL-MCNC: 27 MG/DL (ref 40–60)
HDLC SERPL: 5.2 {RATIO}
HGB BLD-MCNC: 10.2 G/DL (ref 13.6–17.2)
LDLC SERPL CALC-MCNC: 92 MG/DL
LIPID PROFILE,FLP: ABNORMAL
MCH RBC QN AUTO: 30.4 PG (ref 26.1–32.9)
MCHC RBC AUTO-ENTMCNC: 31.6 G/DL (ref 31.4–35)
MCV RBC AUTO: 96.4 FL (ref 79.6–97.8)
NRBC # BLD: 0 K/UL (ref 0–0.2)
PLATELET # BLD AUTO: 165 K/UL (ref 150–450)
PMV BLD AUTO: 11.5 FL (ref 9.4–12.3)
POTASSIUM SERPL-SCNC: 3.1 MMOL/L (ref 3.5–5.1)
RBC # BLD AUTO: 3.35 M/UL (ref 4.23–5.6)
SODIUM SERPL-SCNC: 142 MMOL/L (ref 136–145)
TRIGL SERPL-MCNC: 105 MG/DL (ref 35–150)
TROPONIN I SERPL-MCNC: 0.02 NG/ML (ref 0.02–0.05)
VLDLC SERPL CALC-MCNC: 21 MG/DL (ref 6–23)
WBC # BLD AUTO: 9.7 K/UL (ref 4.3–11.1)

## 2019-04-17 PROCEDURE — 74011250637 HC RX REV CODE- 250/637: Performed by: INTERNAL MEDICINE

## 2019-04-17 PROCEDURE — 80061 LIPID PANEL: CPT

## 2019-04-17 PROCEDURE — 99232 SBSQ HOSP IP/OBS MODERATE 35: CPT | Performed by: INTERNAL MEDICINE

## 2019-04-17 PROCEDURE — 74011250636 HC RX REV CODE- 250/636: Performed by: INTERNAL MEDICINE

## 2019-04-17 PROCEDURE — 80048 BASIC METABOLIC PNL TOTAL CA: CPT

## 2019-04-17 PROCEDURE — 84484 ASSAY OF TROPONIN QUANT: CPT

## 2019-04-17 PROCEDURE — 94760 N-INVAS EAR/PLS OXIMETRY 1: CPT

## 2019-04-17 PROCEDURE — 85027 COMPLETE CBC AUTOMATED: CPT

## 2019-04-17 PROCEDURE — 65660000000 HC RM CCU STEPDOWN

## 2019-04-17 PROCEDURE — 74011636637 HC RX REV CODE- 636/637: Performed by: NURSE PRACTITIONER

## 2019-04-17 PROCEDURE — 36415 COLL VENOUS BLD VENIPUNCTURE: CPT

## 2019-04-17 PROCEDURE — 94640 AIRWAY INHALATION TREATMENT: CPT

## 2019-04-17 PROCEDURE — 77010033678 HC OXYGEN DAILY

## 2019-04-17 PROCEDURE — 74011250637 HC RX REV CODE- 250/637: Performed by: NURSE PRACTITIONER

## 2019-04-17 PROCEDURE — 74011000250 HC RX REV CODE- 250: Performed by: INTERNAL MEDICINE

## 2019-04-17 PROCEDURE — 74011250636 HC RX REV CODE- 250/636: Performed by: NURSE PRACTITIONER

## 2019-04-17 RX ORDER — POTASSIUM CHLORIDE 20 MEQ/1
20 TABLET, EXTENDED RELEASE ORAL 2 TIMES DAILY
Status: DISCONTINUED | OUTPATIENT
Start: 2019-04-17 | End: 2019-04-18

## 2019-04-17 RX ADMIN — NITROGLYCERIN 1 INCH: 20 OINTMENT TOPICAL at 00:43

## 2019-04-17 RX ADMIN — BUDESONIDE 500 MCG: 0.5 INHALANT RESPIRATORY (INHALATION) at 20:16

## 2019-04-17 RX ADMIN — NITROGLYCERIN 1 INCH: 20 OINTMENT TOPICAL at 11:51

## 2019-04-17 RX ADMIN — ALBUTEROL SULFATE 2.5 MG: 2.5 SOLUTION RESPIRATORY (INHALATION) at 08:58

## 2019-04-17 RX ADMIN — FUROSEMIDE 40 MG: 10 INJECTION, SOLUTION INTRAMUSCULAR; INTRAVENOUS at 09:59

## 2019-04-17 RX ADMIN — HYDROCODONE BITARTRATE AND ACETAMINOPHEN 1 TABLET: 7.5; 325 TABLET ORAL at 21:35

## 2019-04-17 RX ADMIN — HEPARIN SODIUM 5000 UNITS: 5000 INJECTION INTRAVENOUS; SUBCUTANEOUS at 09:52

## 2019-04-17 RX ADMIN — CLOPIDOGREL BISULFATE 75 MG: 75 TABLET ORAL at 14:20

## 2019-04-17 RX ADMIN — HEPARIN SODIUM 5000 UNITS: 5000 INJECTION INTRAVENOUS; SUBCUTANEOUS at 00:43

## 2019-04-17 RX ADMIN — Medication 10 ML: at 11:52

## 2019-04-17 RX ADMIN — Medication 5 ML: at 21:37

## 2019-04-17 RX ADMIN — POTASSIUM CHLORIDE 20 MEQ: 20 TABLET, EXTENDED RELEASE ORAL at 17:58

## 2019-04-17 RX ADMIN — BRIMONIDINE TARTRATE 1 DROP: 2 SOLUTION OPHTHALMIC at 21:38

## 2019-04-17 RX ADMIN — Medication 10 ML: at 05:37

## 2019-04-17 RX ADMIN — POTASSIUM CHLORIDE 20 MEQ: 20 TABLET, EXTENDED RELEASE ORAL at 11:51

## 2019-04-17 RX ADMIN — ALBUTEROL SULFATE 2.5 MG: 2.5 SOLUTION RESPIRATORY (INHALATION) at 20:16

## 2019-04-17 RX ADMIN — TAMSULOSIN HYDROCHLORIDE 0.4 MG: 0.4 CAPSULE ORAL at 09:51

## 2019-04-17 RX ADMIN — HEPARIN SODIUM 5000 UNITS: 5000 INJECTION INTRAVENOUS; SUBCUTANEOUS at 23:42

## 2019-04-17 RX ADMIN — NITROGLYCERIN 1 INCH: 20 OINTMENT TOPICAL at 17:59

## 2019-04-17 RX ADMIN — ASPIRIN 81 MG: 81 TABLET, COATED ORAL at 09:51

## 2019-04-17 RX ADMIN — LATANOPROST 1 DROP: 50 SOLUTION OPHTHALMIC at 21:38

## 2019-04-17 RX ADMIN — FUROSEMIDE 40 MG: 10 INJECTION, SOLUTION INTRAMUSCULAR; INTRAVENOUS at 17:59

## 2019-04-17 RX ADMIN — HEPARIN SODIUM 5000 UNITS: 5000 INJECTION INTRAVENOUS; SUBCUTANEOUS at 14:20

## 2019-04-17 RX ADMIN — NITROGLYCERIN 1 INCH: 20 OINTMENT TOPICAL at 05:36

## 2019-04-17 RX ADMIN — BUDESONIDE 500 MCG: 0.5 INHALANT RESPIRATORY (INHALATION) at 08:58

## 2019-04-17 RX ADMIN — PREDNISONE 5 MG: 5 TABLET ORAL at 09:53

## 2019-04-17 RX ADMIN — ALBUTEROL SULFATE 2.5 MG: 2.5 SOLUTION RESPIRATORY (INHALATION) at 14:40

## 2019-04-17 RX ADMIN — TIOTROPIUM BROMIDE 18 MCG: 18 CAPSULE ORAL; RESPIRATORY (INHALATION) at 09:02

## 2019-04-17 NOTE — PROGRESS NOTES
Problem: Falls - Risk of  Goal: *Absence of Falls  Description  Document Meliton Polanco Fall Risk and appropriate interventions in the flowsheet.   Outcome: Progressing Towards Goal  Note:   Fall Risk Interventions:            Medication Interventions: Evaluate medications/consider consulting pharmacy                   Problem: Patient Education: Go to Patient Education Activity  Goal: Patient/Family Education  Outcome: Progressing Towards Goal     Problem: Chronic Obstructive Pulmonary Disease (COPD)  Goal: *Oxygen saturation during activity within specified parameters  Outcome: Progressing Towards Goal  Goal: *Able to remain out of bed as prescribed  Outcome: Progressing Towards Goal  Goal: *Absence of hypoxia  Outcome: Progressing Towards Goal  Goal: *Optimize nutritional status  Outcome: Progressing Towards Goal

## 2019-04-17 NOTE — PROGRESS NOTES
Initial visit to assess pt's spiritual needs. Feeling today? good    Receiving good care?  yes    Needs from Spiritual Care:  Nothing at this time. Pt's  has visited today. Ministry of presence & prayer to demonstrate caring & concern, convey emotional & spiritual support.     Chaplain Mele Sands, MDiv,[facility],PhD

## 2019-04-17 NOTE — PROGRESS NOTES
Verbal bedside report given to aFlguni Walters oncoming RN. Patient's situation, background, assessment and recommendations provided. Opportunity for questions provided. Oncoming RN assumed care of patient.

## 2019-04-17 NOTE — PROGRESS NOTES
[facility] CARDIOLOGY PROGRESS NOTE      4/17/2019 9:39 AM    Admit Date: 4/16/2019    Admit Diagnosis: Volume overload [E87.70]      Subjective:   He is breathing better this am.        Objective:      Vitals:    04/16/19 2222 04/17/19 0038 04/17/19 0451 04/17/19 0536   BP:  125/71 120/75 120/75   Pulse:  90 80 89   Resp:  22 20    Temp:  98.7 °F (37.1 °C) 98.5 °F (36.9 °C)    SpO2: 98% 90% 97%    Weight:   188 lb 11.2 oz (85.6 kg)        Physical Exam:  Neck-   CV- rr+r  1/6SEM  Lungs- no rales  decrease lung sounds   Ext-  Skin- warm and dry        Data Review:   Recent Labs     04/17/19  0305 04/16/19  1443    141   K 3.1* 4.0   BUN 26* 25*   CREA 2.09* 2.12*   GLU 95 107*   WBC 9.7 11.3*   HGB 10.2* 11.8*   HCT 32.3* 37.3*    190   CHOL 140  --    TRIGL 105  --    HDL 27*  --        Assessment and Plan:     Principal Problem:    Volume overload (8/88/5148)    diastolic CHF     will need daily lasix despite CKD. Active Problems:    Coronary artery disease involving native coronary artery of native heart without angina pectoris (1/4/2019)      Overview: 01/2019:  Stable CA\D with Patent LIMA-LAD, SVG-RI, SVG-OM.        COPD (chronic obstructive pulmonary disease) ([address] (1/4/2019)      Hypertension (2/7/2019)      Overview: was taken off BP meds when septic in 10/2018 and hasn't taken since       S/P TAVR (transcatheter aortic valve replacement) (2/12/2019)      Overview: 02/2019:  26mm Thompson Mitchell 3 AVR      Lung nodule (3/21/2019)      SOB (shortness of breath) (4/16/2019)      Hypoxia (4/16/2019)       vascular appt in the am regarding AAA                           replace potassium and switch to po lasix in am   continue to monitor renal status    CKD with severe diffuse bilateral AYAZ   no ACE or ARB        Yris Du MD

## 2019-04-17 NOTE — PROGRESS NOTES
Met with patient and wife at bedside. Patient states that he lives with his wife. Uses walker occasionally. Wife is primary caretaker. Uses home O2 and lately increased to using it all day. Bridget Guerra is the home O2 provider. [facility] but was having difficulty breathing and came in to the ER. PCP: Dr. Justice Schultz following.

## 2019-04-17 NOTE — PROGRESS NOTES
Jozef Oliveros  Admission Date: 4/16/2019             Daily Progress Note: 4/17/2019    The patient's chart is reviewed and the patient is discussed with the staff. Festus Luna is a 69 y. o. year-old gentleman with a 150-pack-year smoking history (quit 2001), rheumatoid arthritis on prednisone 5 mg, AAA, CAD, AS s/p TAVR who was referred by Dr. Pickard [place] evaluation of pulmonary nodule.  A cardiac CT was performed on January 24th in which the radiologist noted 2 adjacent right upper lobe nodules measuring 1.4 and 1.3 cm. He recently underwent TAVR and PPM implantation placement in Feb 2019 and has a plan to undergo ophthalmologic surgery in May 2019.     The patient reports a history notable for COPD for which she has been prescribed 2 L/min of supplemental oxygen at night and currently uses Aurelio Vicky was previously on the [employer] inhaler but had concerns about glaucoma that prompted the switch to Breo.  His primary symptoms are shortness of breath with exertion.  He rarely coughs or wheezes.  Almost any exertion prompts dyspnea.  He was prescribed oxygen after hospitalization for kidney stones last year and continues to use nocturnal oxygen but does not use with exertion.  He has not been hospitalized for pneumonia nor been vaccinated against.  Complete pulmonary function testing was performed in January and was suggestive of mild gold stage II COPD. Patient admitted for decompensated CHF- we are asked to see for his PET CT findings. He was being worked up for a suspected lung cancer in our office. PET CT with pet positive RUL nodule and he will ultimately need a Navigational biopsy and EBUS staging which will be scheduled as OP. His surgical candidacy will be limited by his cardiac problems. He should however be a good candidate for cyber knife Rx and will need fiducial marker placement for that purpose which can be done during navigational bronchoscopy.   His case was discussed in Tumor board today. Subjective:     Patient on 4lpm. He reports he was on 2L at home. He has a productive cough in the morning but denies any cough throughout the day. He has occasional wheezes. Patient waiting for decision from tumor board. He is not sure if he wants any intervention but will wait for plan discussed today.      Current Facility-Administered Medications   Medication Dose Route Frequency    [START ON 4/18/2019] furosemide (LASIX) tablet 60 mg  60 mg Oral DAILY    potassium chloride (K-DUR, KLOR-CON) SR tablet 20 mEq  20 mEq Oral BID    albuterol (PROVENTIL VENTOLIN) nebulizer solution 2.5 mg  2.5 mg Nebulization TID PRN    aspirin delayed-release tablet 81 mg  81 mg Oral DAILY    brimonidine (ALPHAGAN) 0.2 % ophthalmic solution 1 Drop  1 Drop Left Eye QHS    clopidogrel (PLAVIX) tablet 75 mg  75 mg Oral DAILY    HYDROcodone-acetaminophen (NORCO) 7.5-325 mg per tablet 1 Tab  1 Tab Oral QHS PRN    predniSONE (DELTASONE) tablet 5 mg  5 mg Oral DAILY    tamsulosin (FLOMAX) capsule 0.4 mg  0.4 mg Oral DAILY    latanoprost (XALATAN) 0.005 % ophthalmic solution 1 Drop  1 Drop Left Eye QHS    sodium chloride (NS) flush 5-40 mL  5-40 mL IntraVENous Q8H    sodium chloride (NS) flush 5-40 mL  5-40 mL IntraVENous PRN    nitroglycerin (NITROBID) 2 % ointment 1 Inch  1 Inch Topical Q6H    nitroglycerin (NITROSTAT) tablet 0.4 mg  0.4 mg SubLINGual Q5MIN PRN    morphine injection 2 mg  2 mg IntraVENous Q4H PRN    heparin (porcine) injection 5,000 Units  5,000 Units SubCUTAneous Q8H    furosemide (LASIX) injection 40 mg  40 mg IntraVENous BID    tiotropium (SPIRIVA) inhalation capsule 18 mcg  1 Cap Inhalation DAILY    albuterol (PROVENTIL VENTOLIN) nebulizer solution 2.5 mg  2.5 mg Nebulization Q6HWA RT    And    budesonide (PULMICORT) 500 mcg/2 ml nebulizer suspension  500 mcg Nebulization BID RT    hydrALAZINE (APRESOLINE) 20 mg/mL injection 10 mg  10 mg IntraVENous Q6H PRN       Review of Systems  Constitutional: negative for fever, chills, sweats  Cardiovascular: negative for chest pain, palpitations, syncope, edema  Gastrointestinal:  negative for dysphagia, reflux, vomiting, diarrhea, abdominal pain, or melena  Neurologic:  negative for focal weakness, numbness, headache    Objective:     Vitals:    04/17/19 0536 04/17/19 1046 04/17/19 1147 04/17/19 1442   BP: 120/75 146/70 154/78    Pulse: 89 98 99    Resp:  20 22    Temp:  98.2 °F (36.8 °C)     SpO2:  94% 90% 97%   Weight:         Intake and Output:   04/15 1901 - 04/17 0700  In: 480 [P.O.:480]  Out: 2775 [Urine:2775]  04/17 0701 - 04/17 1900  In: -   Out: 650 [Urine:650]    Physical Exam:   Constitution:  the patient is well developed and in no acute distress, [place]  EENMT:  Sclera clear, pupils equal, oral mucosa moist  Respiratory: decreased but clear, no wheezes   Cardiovascular:  RRR without M,G,R  Gastrointestinal: soft and non-tender; with positive bowel sounds. Musculoskeletal: warm without cyanosis. There is no lower leg edema. Skin:  no jaundice or rashes, no open wounds   Neurologic: no gross neuro deficits     Psychiatric:  alert and oriented x 3    CHEST XRAY: 4/16/19        LAB  No results for input(s): GLUCPOC in the last 72 hours. No lab exists for component: [place]   Recent Labs     04/17/19  0305 04/16/19  1443   WBC 9.7 11.3*   HGB 10.2* 11.8*   HCT 32.3* 37.3*    190     Recent Labs     04/17/19  0305 04/16/19  2102 04/16/19  1443     --  141   K 3.1*  --  4.0     --  109*   CO2 27  --  24   GLU 95  --  107*   BUN 26*  --  25*   CREA 2.09*  --  2.12*   CA 7.8*  --  8.8   TROIQ 0.02 <0.02* <0.02*     No results for input(s): PH, PCO2, PO2, HCO3 in the last 72 hours. No results for input(s): LCAD, LAC in the last 72 hours.       Assessment:  (Medical Decision Making)     Patient Active Problem List   Diagnosis Code    Pre-op exam Z01.818    Coronary artery disease involving native coronary artery of native heart without angina pectoris I25.10    COPD (chronic obstructive pulmonary disease) ([facility]) J44.9    Hypertension I10    S/P TAVR (transcatheter aortic valve replacement) Z95.2    AAA (abdominal aortic aneurysm) without rupture ([facility]) I71.4    RA (rheumatoid arthritis) ([facility]) M06.9    High cholesterol E78.00    S/P placement of cardiac pacemaker Z95.0    Lung nodule R91.1    SOB (shortness of breath) R06.02    S/P CABG (coronary artery bypass graft) Z95.1    Hypoxia R09.02    Volume overload E87.70    Stage 3 chronic kidney disease ([facility]) N18.3    Atherosclerotic AYAZ (renal artery stenosis), bilateral ([facility]) I70.1         Plan:  (Medical Decision Making)     Hospital Problems  Date Reviewed: 4/17/2019          Codes Class Noted POA    Stage 3 chronic kidney disease ([facility]) ICD-10-CM: N18.3  ICD-9-CM: 585.3  4/17/2019 Unknown     The descending thoracic aorta as well as the abdominal aorta demonstrates  diffuse atherosclerotic changes. There is a infrarenal abdominal aortic aneurysm  measuring 5.5 x 5.1 cm the celiac artery demonstrates stenosis at its origin. There is a moderate stenosis of the origin of the cyst superior mesenteric  artery. The renal arteries demonstrate diffuse atherosclerotic changes and  significant stenoses at their origins. Both kidneys are atrophic left greater  than right. A decreased nephrogram is noted on the left as compared to the  contralateral side. Creatinine 2.09     Atherosclerotic AYAZ (renal artery stenosis), bilateral ([facility]) ICD-10-CM: I70.1  ICD-9-CM: 440.1  4/17/2019 Unknown     The descending thoracic aorta as well as the abdominal aorta demonstrates  diffuse atherosclerotic changes. There is a infrarenal abdominal aortic aneurysm  measuring 5.5 x 5.1 cm the celiac artery demonstrates stenosis at its origin. There is a moderate stenosis of the origin of the cyst superior mesenteric  artery.  The renal arteries demonstrate diffuse atherosclerotic changes and  significant stenoses at their origins. Both kidneys are atrophic left greater  than right. A decreased nephrogram is noted on the left as compared to the  contralateral side. SOB (shortness of breath) ICD-10-CM: R06.02  ICD-9-CM: 786.05  4/16/2019 Yes    Requiring supplemental oxygen  Improved with diuresis. Wean oxygen as able. Hypoxia ICD-10-CM: R09.02  ICD-9-CM: 799.02  4/16/2019 Yes    On 4L oxygen, wean as able. * (Principal) Volume overload ICD-10-CM: E87.70  ICD-9-CM: 276.69  4/16/2019 Unknown    Net Output 2945 ml since admission    Lung nodule (Chronic) ICD-10-CM: R91.1  ICD-9-CM: 793.11  3/21/2019 Yes    Suspected primary early lung cancer- being worked up. S/P TAVR (transcatheter aortic valve replacement) (Chronic) ICD-10-CM: Z95.2  ICD-9-CM: V43.3  2/12/2019 Yes     02/2019:  26mm Thompson Mitchell 3 AVR         On lasix with diuresis     Hypertension (Chronic) ICD-10-CM: I10  ICD-9-CM: 401.9  2/7/2019 Yes     was taken off BP meds when septic in 10/2018 and hasn't taken since              Coronary artery disease involving native coronary artery of native heart without angina pectoris ICD-10-CM: I25.10  ICD-9-CM: 414.01  1/4/2019 Yes     01/2019:  Stable CA\D with Patent LIMA-LAD, SVG-RI, SVG-OM. COPD (chronic obstructive pulmonary disease) ([facility] ICD-10-CM: J44.9  ICD-9-CM: 634  1/4/2019 Yes    No wheezing. Continue BD, Pulmicort and Spiriva             More than 50% of the time documented was spent in face-to-face contact with the patient and in the care of the patient on the floor/unit where the patient is located. Jose Dawson, ABE     Lungs: no wheezing  Heart:  RRR with no Murmur/Rubs/Gallops    Additional Comments:  Wean O2, cont. BD and Pulmicort and diuretics    I have spoken with and examined the patient. I agree with the above assessment and plan as documented.     Maryse Zhang MD

## 2019-04-17 NOTE — PROGRESS NOTES
Verbal bedside report received from Lucien Moreira, [facility]. Assumed care of patient.

## 2019-04-18 VITALS
DIASTOLIC BLOOD PRESSURE: 92 MMHG | BODY MASS INDEX: 26.03 KG/M2 | TEMPERATURE: 98.6 F | RESPIRATION RATE: 21 BRPM | HEART RATE: 99 BPM | SYSTOLIC BLOOD PRESSURE: 157 MMHG | OXYGEN SATURATION: 90 % | WEIGHT: 184 LBS

## 2019-04-18 LAB
ANION GAP SERPL CALC-SCNC: 10 MMOL/L (ref 7–16)
BUN SERPL-MCNC: 24 MG/DL (ref 8–23)
CALCIUM SERPL-MCNC: 8.2 MG/DL (ref 8.3–10.4)
CHLORIDE SERPL-SCNC: 104 MMOL/L (ref 98–107)
CO2 SERPL-SCNC: 29 MMOL/L (ref 21–32)
CREAT SERPL-MCNC: 2.01 MG/DL (ref 0.8–1.5)
ERYTHROCYTE [DISTWIDTH] IN BLOOD BY AUTOMATED COUNT: 13.5 % (ref 11.9–14.6)
GLUCOSE SERPL-MCNC: 80 MG/DL (ref 65–100)
HCT VFR BLD AUTO: 32.5 % (ref 41.1–50.3)
HGB BLD-MCNC: 10.3 G/DL (ref 13.6–17.2)
MCH RBC QN AUTO: 30.5 PG (ref 26.1–32.9)
MCHC RBC AUTO-ENTMCNC: 31.7 G/DL (ref 31.4–35)
MCV RBC AUTO: 96.2 FL (ref 79.6–97.8)
NRBC # BLD: 0 K/UL (ref 0–0.2)
PLATELET # BLD AUTO: 170 K/UL (ref 150–450)
PMV BLD AUTO: 12 FL (ref 9.4–12.3)
POTASSIUM SERPL-SCNC: 3 MMOL/L (ref 3.5–5.1)
RBC # BLD AUTO: 3.38 M/UL (ref 4.23–5.6)
SODIUM SERPL-SCNC: 143 MMOL/L (ref 136–145)
WBC # BLD AUTO: 10 K/UL (ref 4.3–11.1)

## 2019-04-18 PROCEDURE — 80048 BASIC METABOLIC PNL TOTAL CA: CPT

## 2019-04-18 PROCEDURE — 74011250637 HC RX REV CODE- 250/637: Performed by: INTERNAL MEDICINE

## 2019-04-18 PROCEDURE — 74011250636 HC RX REV CODE- 250/636: Performed by: NURSE PRACTITIONER

## 2019-04-18 PROCEDURE — 94640 AIRWAY INHALATION TREATMENT: CPT

## 2019-04-18 PROCEDURE — 77030020263 HC SOL INJ SOD CL0.9% LFCR 1000ML

## 2019-04-18 PROCEDURE — 36415 COLL VENOUS BLD VENIPUNCTURE: CPT

## 2019-04-18 PROCEDURE — 85027 COMPLETE CBC AUTOMATED: CPT

## 2019-04-18 PROCEDURE — 74011250637 HC RX REV CODE- 250/637: Performed by: NURSE PRACTITIONER

## 2019-04-18 PROCEDURE — 74011000250 HC RX REV CODE- 250: Performed by: INTERNAL MEDICINE

## 2019-04-18 PROCEDURE — 74011636637 HC RX REV CODE- 636/637: Performed by: NURSE PRACTITIONER

## 2019-04-18 PROCEDURE — 94760 N-INVAS EAR/PLS OXIMETRY 1: CPT

## 2019-04-18 RX ORDER — POTASSIUM CHLORIDE 20 MEQ/1
20 TABLET, EXTENDED RELEASE ORAL DAILY
Qty: 30 TAB | Refills: 6 | Status: SHIPPED | OUTPATIENT
Start: 2019-04-18 | End: 2019-04-18

## 2019-04-18 RX ORDER — POTASSIUM CHLORIDE 20 MEQ/1
40 TABLET, EXTENDED RELEASE ORAL DAILY
Qty: 60 TAB | Refills: 6 | Status: SHIPPED | OUTPATIENT
Start: 2019-04-18

## 2019-04-18 RX ORDER — FUROSEMIDE 40 MG/1
60 TABLET ORAL DAILY
Qty: 30 TAB | Refills: 6 | Status: SHIPPED | OUTPATIENT
Start: 2019-04-18

## 2019-04-18 RX ORDER — POTASSIUM CHLORIDE 20 MEQ/1
40 TABLET, EXTENDED RELEASE ORAL
Status: COMPLETED | OUTPATIENT
Start: 2019-04-18 | End: 2019-04-18

## 2019-04-18 RX ORDER — POTASSIUM CHLORIDE 14.9 MG/ML
20 INJECTION INTRAVENOUS ONCE
Status: COMPLETED | OUTPATIENT
Start: 2019-04-18 | End: 2019-04-18

## 2019-04-18 RX ADMIN — Medication 5 ML: at 06:34

## 2019-04-18 RX ADMIN — BUDESONIDE 500 MCG: 0.5 INHALANT RESPIRATORY (INHALATION) at 08:02

## 2019-04-18 RX ADMIN — TIOTROPIUM BROMIDE 18 MCG: 18 CAPSULE ORAL; RESPIRATORY (INHALATION) at 08:13

## 2019-04-18 RX ADMIN — POTASSIUM CHLORIDE 40 MEQ: 20 TABLET, EXTENDED RELEASE ORAL at 10:05

## 2019-04-18 RX ADMIN — POTASSIUM CHLORIDE 20 MEQ: 200 INJECTION, SOLUTION INTRAVENOUS at 10:06

## 2019-04-18 RX ADMIN — CLOPIDOGREL BISULFATE 75 MG: 75 TABLET ORAL at 09:22

## 2019-04-18 RX ADMIN — FUROSEMIDE 60 MG: 40 TABLET ORAL at 06:31

## 2019-04-18 RX ADMIN — TAMSULOSIN HYDROCHLORIDE 0.4 MG: 0.4 CAPSULE ORAL at 09:22

## 2019-04-18 RX ADMIN — NITROGLYCERIN 1 INCH: 20 OINTMENT TOPICAL at 06:33

## 2019-04-18 RX ADMIN — ALBUTEROL SULFATE 2.5 MG: 2.5 SOLUTION RESPIRATORY (INHALATION) at 08:02

## 2019-04-18 RX ADMIN — PREDNISONE 5 MG: 5 TABLET ORAL at 09:22

## 2019-04-18 RX ADMIN — ASPIRIN 81 MG: 81 TABLET, COATED ORAL at 09:22

## 2019-04-18 RX ADMIN — NITROGLYCERIN 1 INCH: 20 OINTMENT TOPICAL at 00:25

## 2019-04-18 NOTE — PROGRESS NOTES
Verbal bedside report received from Alise Metcalf, [address] [place] [address]. Patient's situation, background, assessment and recommendations were provided. Kardex, Mar, and recent results also reviewed. Opportunity for questions provided. Assumed care of patient.

## 2019-04-18 NOTE — PROGRESS NOTES
Verbal bedside report received from [address], [facility]. Assumed care of patient.

## 2019-04-18 NOTE — DISCHARGE SUMMARY
Physician Discharge Summary     Patient ID:  Silvia Bowens  [id number]  24 y.o.  1940    Admit date: 4/16/2019    Discharge date and time: 4/18/19    Admitting Physician: Juan Ramos MD     Primary Cardiologist:Dr. Celeste Chong    Primary Care MD:Constantino Bravo MD    Discharge Physician: Aguila Smith NP    Admission Diagnoses: Volume overload [E87.70]    Discharge Diagnoses:   Patient Active Problem List    Diagnosis Date Noted    Stage 3 chronic kidney disease ([address] 04/17/2019    Atherosclerotic AYAZ (renal artery stenosis), bilateral ([address] 04/17/2019    SOB (shortness of breath) 04/16/2019    S/P CABG (coronary artery bypass graft) 04/16/2019    Hypoxia 04/16/2019    Volume overload 04/16/2019    Lung nodule 03/21/2019    S/P placement of cardiac pacemaker 02/13/2019    S/P TAVR (transcatheter aortic valve replacement) 02/12/2019    AAA (abdominal aortic aneurysm) without rupture ([facility] 02/12/2019    RA (rheumatoid arthritis) ([address] 02/12/2019    High cholesterol 02/12/2019    Hypertension 02/07/2019    Pre-op exam 01/04/2019    Coronary artery disease involving native coronary artery of native heart without angina pectoris 01/04/2019    COPD (chronic obstructive pulmonary disease) ([facility] 01/04/2019           Hospital Course: Silvia Bowens was admitted to the hospital on 4/16/2019 as direct admit from office. He was seen by Dr. Guru Thomas. Joyceann Shock with several day hx of SOB, wt gain and increased lower extremity edema. Upon arrival pt was in acute respiratory distress. He responded well to immediate diuresis. He had approximately 4 liter diuresis with clinical improvement of SOB and edema. Pt recently underwent TAVR. He was seen this am and felt acceptable for discharge. He was seen by Pulmonary regarding below CT finding of probable lung cancer. His case is being discussed in tumor board for pending planned interventions for his lung cancer.  He did become hypokalemic with aggressive diuresis. Supplemental potassium prescribed with follow up BMP in one week with TC 7 appt. HPI--copied from office note: The patient is a 69-year-old male worked in today for severe shortness of breath and swelling of his feet. History of coronary disease previous bypass surgery and recent discovered to have aortic stenosis. He underwent evaluation and his grafts were stable. He had a CT of his aorta and chest which showed some spiculated nodules but he underwent T AVR successfully. Having to have a pacemaker was implanted in the last 2 months which went well. Follow-up chest x-ray showed clear lung fields. He had a follow-up echo about a month after the valve replacement which showed a EF of over 70%. BNP levels have been in the 200 range. He has severe COPD on the CT scan has been to see pulmonary pulmonary. In the last week he has had progressively increased shortness of breath he is on 2 L of oxygen and cannot even lay flat. He can even walk around. He denies any chest pain. Had increased swelling of his legs. He had noticed particular pain in his calf. Here in the office he could not even walking the marquis without his oxygen level dropping from 93 into the upper 80s just with a little bit of movement.               Discharge Exam:     Visit Vitals  /72   Pulse 91   Temp 98.1 °F (36.7 °C)   Resp 18   Wt 83.5 kg (184 lb)   SpO2 91%   BMI 26.03 kg/m²     General Appearance:  Well developed, well nourished,alert and oriented x 3, and individual in no acute distress. Ears/Nose/Mouth/Throat:   Hearing grossly normal.         Neck: Supple. Chest:   Lungs clear to auscultation bilaterally. Cardiovascular:  Regular rate and rhythm, S1, S2 normal, no murmur. Abdomen:   Soft, non-tender, bowel sounds are active. Extremities: No edema bilaterally. Skin: Warm and dry.                Final Laboratory Data:  Recent Results (from the past 24 hour(s))   CBC W/O DIFF    Collection Time: 04/18/19  4:36 AM   Result Value Ref Range    WBC 10.0 4.3 - 11.1 K/uL    RBC 3.38 (L) 4.23 - 5.6 M/uL    HGB 10.3 (L) 13.6 - 17.2 g/dL    HCT 32.5 (L) 41.1 - 50.3 %    MCV 96.2 79.6 - 97.8 FL    MCH 30.5 26.1 - 32.9 PG    MCHC 31.7 31.4 - 35.0 g/dL    RDW 13.5 11.9 - 14.6 %    PLATELET 859 616 - 900 K/uL    MPV 12.0 9.4 - 12.3 FL    ABSOLUTE NRBC 0.00 0.0 - 0.2 K/uL       Disposition: home    Patient Instructions:   Current Discharge Medication List      START taking these medications    Details   furosemide (LASIX) 40 mg tablet Take 1.5 Tabs by mouth daily. Qty: 30 Tab, Refills: 6      potassium chloride (K-DUR, KLOR-CON) 20 mEq tablet Take 2 Tabs by mouth daily. Qty: 60 Tab, Refills: 6         CONTINUE these medications which have NOT CHANGED    Details   OXYGEN-AIR DELIVERY SYSTEMS by Does Not Apply route. 2lpm on excertion      umeclidinium-vilanterol (ANORO ELLIPTA) 62.5-25 mcg/actuation inhaler Take 1 Puff by inhalation daily. Qty: 1 Inhaler, Refills: 11    Associated Diagnoses: Stage 2 moderate COPD by GOLD classification ([facility])      clopidogrel (PLAVIX) 75 mg tab Take 1 Tab by mouth daily. Qty: 30 Tab, Refills: 11      aspirin delayed-release 81 mg tablet Take 81 mg by mouth daily. predniSONE (DELTASONE) 5 mg tablet Take 5 mg by mouth daily. infliximab (REMICADE IV) by IntraVENous route. Every 7-8 weeks; last infusion 12/19/18      acetaminophen (TYLENOL) 325 mg cap Take 1,000 mg by mouth nightly as needed for Pain. HYDROcodone-acetaminophen (NORCO) 7.5-325 mg per tablet Take 1 Tab by mouth nightly as needed for Pain. travoprost (TRAVATAN Z) 0.004 % ophthalmic solution Administer 1 Drop to left eye nightly. brimonidine (ALPHAGAN P) 0.1 % ophthalmic solution Administer 1 Drop to left eye nightly. albuterol (PROVENTIL VENTOLIN) 2.5 mg /3 mL (0.083 %) nebulizer solution 2.5 mg by Nebulization route three (3) times daily as needed for Wheezing or Shortness of Breath.  Take / use AM day of surgery  per anesthesia protocols if needed. tamsulosin (FLOMAX) 0.4 mg capsule Take 0.4 mg by mouth daily. albuterol (PROAIR HFA) 90 mcg/actuation inhaler Take 2 Puffs by inhalation every four (4) hours as needed for Wheezing or Shortness of Breath. Take / use AM day of surgery  per anesthesia protocols. cholecalciferol (VITAMIN D3) 1,000 unit tablet Take 1,000 Units by mouth daily. ATTENDING ADDENDUM:    Patient seen and examined by me. Agree with above note by physician extender. Key findings are:  No CP or SANCHEZ, no edema and no orthopnea. Wants to go home today. CV- RRR without murmur  Lungs- Clear bilaterally  Abd- soft, nontender, nondistended  Ext- no edema    Plan: As above. Home on DAPT and current meds, check BMP 1 week. Continue 60mg lasix, call with increased SOB, orthopnea, edema, weight gain>5lbs.     Siomara Hanna MD  [facility]  Pager [phone]

## 2019-04-18 NOTE — PROGRESS NOTES
START taking these medications     Details   furosemide (LASIX) 40 mg tablet Take 1.5 Tabs by mouth daily. Qty: 30 Tab, Refills: 6       potassium chloride (K-DUR, KLOR-CON) 20 mEq tablet Take 2 Tabs by mouth daily. Qty: 60 Tab, Refills: 6             Discharge instructions reviewed with patient. Prescriptions given for see above and med info sheets provided for all new medications. Opportunity for questions provided. Patient voiced understanding of all discharge instructions.      IV and monitor removed upon d/c

## 2019-04-18 NOTE — DISCHARGE INSTRUCTIONS
Patient Education        Shortness of Breath: Care Instructions  Your Care Instructions  Shortness of breath has many causes. Sometimes conditions such as anxiety can lead to shortness of breath. Some people get mild shortness of breath when they exercise. Trouble breathing also can be a symptom of a serious problem, such as asthma, lung disease, emphysema, heart problems, and pneumonia. If your shortness of breath continues, you may need tests and treatment. Watch for any changes in your breathing and other symptoms. Follow-up care is a key part of your treatment and safety. Be sure to make and go to all appointments, and call your doctor if you are having problems. It's also a good idea to know your test results and keep a list of the medicines you take. How can you care for yourself at home? · Do not smoke or allow others to smoke around you. If you need help quitting, talk to your doctor about stop-smoking programs and medicines. These can increase your chances of quitting for good. · Get plenty of rest and sleep. · Take your medicines exactly as prescribed. Call your doctor if you think you are having a problem with your medicine. · Find healthy ways to deal with stress. ? Exercise daily. ? Get plenty of sleep. ? Eat regularly and well. When should you call for help? Call 911 anytime you think you may need emergency care. For example, call if:    · You have severe shortness of breath.     · You have symptoms of a heart attack. These may include:  ? Chest pain or pressure, or a strange feeling in the chest.  ? Sweating. ? Shortness of breath. ? Nausea or vomiting. ? Pain, pressure, or a strange feeling in the back, neck, jaw, or upper belly or in one or both shoulders or arms. ? Lightheadedness or sudden weakness. ? A fast or irregular heartbeat. After you call 911, the  may tell you to chew 1 adult-strength or 2 to 4 low-dose aspirin. Wait for an ambulance.  Do not try to drive yourself.    Call your doctor now or seek immediate medical care if:    · Your shortness of breath gets worse or you start to wheeze. Wheezing is a high-pitched sound when you breathe.     · You wake up at night out of breath or have to prop your head up on several pillows to breathe.     · You are short of breath after only light activity or while at rest.    Watch closely for changes in your health, and be sure to contact your doctor if:    · You do not get better over the next 1 to 2 days. Where can you learn more? Go to http://sebastian-ran.info/. Enter S780 in the search box to learn more about \"Shortness of Breath: Care Instructions. \"  Current as of: September 5, 2018  Content Version: 11.9  © 6137-8694 [employer] Care instructions adapted under license by [employer] (which disclaims liability or warranty for this information). If you have questions about a medical condition or this instruction, always ask your healthcare professional. [place] [address] any warranty or liability for your use of this information. Patient Education        Learning About Fluid Overload  What is fluid overload? Fluid overload means that your body has too much water. The extra fluid in your body can raise your blood pressure and force your heart to work harder. It can also make it hard for you to breathe. Most of your body is made up of water. The body uses minerals like sodium and potassium to help organs such as your heart, kidneys, and liver balance how much water you need. For example, the heart pumps blood to move water around the body. And the kidneys work to get rid of the water that the body doesn't need. Health conditions like kidney disease, heart failure, and cirrhosis can cause fluid overload. Other things can cause extra fluid to build up.  IV fluids, some medicines, too much salt (sodium) from food, and certain medical treatments can sometimes cause this fluid increase. What are the symptoms? Some of the most common symptoms are:  · Gaining weight over a short period of time. · Swelling in the ankles or legs. · Shortness of breath. How is it treated? The goal of treatment is to remove the extra fluid in your body. Your treatment will depend on the cause. Your doctor may:  · Give you medicines, such as diuretics (also called \"water pills\"). They help your body get rid of the extra fluid. · Restrict your fluid or salt intake. Follow-up care is a key part of your treatment and safety. Be sure to make and go to all appointments, and call your doctor if you are having problems. It's also a good idea to know your test results and keep a list of the medicines you take. Where can you learn more? Go to http://sebastian-ran.info/. Enter O110 in the search box to learn more about \"Learning About Fluid Overload. \"  Current as of: July 22, 2018  Content Version: 11.9  © 5378-9168 [employer]. Care instructions adapted under license by [employer] (which disclaims liability or warranty for this information). If you have questions about a medical condition or this instruction, always ask your healthcare professional. [address] any warranty or liability for your use of this information. Patient Education        Limiting Sodium and Fluids With Heart Failure: Care Instructions  Your Care Instructions    Sodium causes your body to hold on to extra water. This may cause your heart failure symptoms to get worse. Limiting sodium may help you feel better and lower your risk of having to go to the hospital.  People get most of their sodium from processed foods. Fast food and restaurant meals also tend to be very high in sodium. Your doctor may suggest that you limit sodium to 2,000 milligrams (mg) a day or less.  That is less than 1 teaspoon of salt a day, including all the salt you eat in cooked or packaged foods. Usually, you have to limit the amount of liquids you drink only if your heart failure is severe. Limiting sodium alone often is enough to help your body get rid of extra fluids. However, your doctor may tell you to limit your fluid intake to a set amount each day. Follow-up care is a key part of your treatment and safety. Be sure to make and go to all appointments, and call your doctor if you are having problems. It's also a good idea to know your test results and keep a list of the medicines you take. How can you care for yourself at home? Read food labels  · Read food labels on cans and food packages. The labels tell you how much sodium is in each serving. Make sure that you look at the serving size. If you eat more than the serving size, you have eaten more sodium than is listed for one serving. · Food labels also tell you the Percent Daily Value. If the Percent Daily Value says 50%, it means that you will get at least 50% of all the sodium you need for the entire day in one serving. Choose products with low Percent Daily Values for sodium. · Be aware that sodium can come in forms other than salt, including monosodium glutamate (MSG), sodium citrate, and sodium bicarbonate (baking soda). MSG is often added to Asian food. You can sometimes ask for food without MSG or salt. Buy low-sodium foods  · Buy foods that are labeled \"unsalted\" (no salt added), \"sodium-free\" (less than 5 mg of sodium per serving), or \"low-sodium\" (less than 140 mg of sodium per serving). A food labeled \"light sodium\" has less than half of the full-sodium version of that food. Foods labeled \"reduced-sodium\" may still have too much sodium. · Buy fresh vegetables or plain, frozen vegetables. Buy low-sodium versions of canned vegetables, soups, and other canned goods. Prepare low-sodium meals  · Use less salt each day when cooking. Reducing salt in this way will help you adjust to the taste.  Do not add salt after cooking. Take the salt shaker off the table. · Flavor your food with garlic, lemon juice, onion, vinegar, herbs, and spices instead of salt. Do not use soy sauce, steak sauce, onion salt, garlic salt, mustard, or ketchup on your food. · Make your own salad dressings, sauces, and ketchup without adding salt. · Use less salt (or none) when recipes call for it. You can often use half the salt a recipe calls for without losing flavor. Other dishes like rice, pasta, and grains do not need added salt. · Rinse canned vegetables. This removes some--but not all--of the salt. · Avoid water that has a naturally high sodium content or that has been treated with water softeners, which add sodium. Call your local water company to find out the sodium content of your water supply. If you buy bottled water, read the label and choose a sodium-free brand. Avoid high-sodium foods, such as:  · Smoked, cured, salted, and canned meat, fish, and poultry. · Ham, scherer, hot dogs, and luncheon meats. · Regular, hard, and processed cheese and regular peanut butter. · Crackers with salted tops. · Frozen prepared meals. · Canned and dried soups, broths, and bouillon, unless labeled sodium-free or low-sodium. · Canned vegetables, unless labeled sodium-free or low-sodium. · Salted snack foods such as chips and pretzels. · [employer] fries, pizza, tacos, and other fast foods. · Pickles, olives, ketchup, and other condiments, especially soy sauce, unless labeled sodium-free or low-sodium. If you cannot cook for yourself  · Have family members or friends help you, or have someone cook low-sodium meals. · Check with your local senior nutrition program to find out where meals are served and whether they offer a low-sodium option. You can often find these programs through your local health department or hospital.  · Have meals delivered to your home.  Most [facility] have a Meals on [employer] These programs provide one hot meal a day for older adults, delivered to their homes. Ask whether these meals are low-sodium. Let them know that you are on a low-sodium diet. Limiting fluid intake  · Find a method that works for you. You might simply write down how much you drink every time you do. Some people keep a container filled with the amount of fluid allowed for that day. If they drink from a source other than the container, then they pour out that amount. · Measure your regular drinking glasses to find out how much fluid each one holds. Once you know this, you will not have to measure every time. · Besides water, milk, juices, and other drinks, some foods have a lot of fluid. Count any foods that will melt (such as ice cream or gelatin dessert) or liquid foods (such as soup) as part of your fluid intake for the day. Where can you learn more? Go to http://sebastian-ran.info/. Enter A166 in the search box to learn more about \"Limiting Sodium and Fluids With Heart Failure: Care Instructions. \"  Current as of: July 22, 2018  Content Version: 11.9  © 9354-0223 [employer]. Care instructions adapted under license by [employer] (which disclaims liability or warranty for this information). If you have questions about a medical condition or this instruction, always ask your healthcare professional. [place] [address] any warranty or liability for your use of this information. Patient Education        Heart-Healthy Diet: Care Instructions  Your Care Instructions    A heart-healthy diet has lots of vegetables, fruits, nuts, beans, and whole grains, and is low in salt. It limits foods that are high in saturated fat, such as meats, cheeses, and fried foods. It may be hard to change your diet, but even small changes can lower your risk of heart attack and heart disease. Follow-up care is a key part of your treatment and safety.  Be sure to make and go to all appointments, and call your doctor if you are having problems. It's also a good idea to know your test results and keep a list of the medicines you take. How can you care for yourself at home? Watch your portions  · Learn what a serving is. A \"serving\" and a \"portion\" are not always the same thing. Make sure that you are not eating larger portions than are recommended. For example, a serving of pasta is ½ cup. A serving size of meat is 2 to 3 ounces. A 3-ounce serving is about the size of a deck of cards. Measure serving sizes until you are good at [place]" them. Keep in mind that restaurants often serve portions that are 2 or 3 times the size of one serving. · To keep your energy level up and keep you from feeling hungry, eat often but in smaller portions. · Eat only the number of calories you need to stay at a healthy weight. If you need to lose weight, eat fewer calories than your body burns (through exercise and other physical activity). Eat more fruits and vegetables  · Eat a variety of fruit and vegetables every day. Dark green, deep orange, red, or yellow fruits and vegetables are especially good for you. Examples include spinach, carrots, peaches, and berries. · Keep carrots, celery, and other veggies handy for snacks. Buy fruit that is in season and store it where you can see it so that you will be tempted to eat it. · Cook dishes that have a lot of veggies in them, such as stir-fries and soups. Limit saturated and trans fat  · Read food labels, and try to avoid saturated and trans fats. They increase your risk of heart disease. Trans fat is found in many processed foods such as cookies and crackers. · Use olive or canola oil when you cook. Try cholesterol-lowering spreads, such as Benecol or Take Control. · Bake, broil, grill, or steam foods instead of frying them. · Choose lean meats instead of high-fat meats such as hot dogs and sausages. Cut off all visible fat when you prepare meat.   · Eat fish, skinless poultry, and meat alternatives such as soy products instead of high-fat meats. Soy products, such as tofu, may be especially good for your heart. · Choose low-fat or fat-free milk and dairy products. Eat fish  · Eat at least two servings of fish a week. Certain fish, such as salmon and tuna, contain omega-3 fatty acids, which may help reduce your risk of heart attack. Eat foods high in fiber  · Eat a variety of grain products every day. Include whole-grain foods that have lots of fiber and nutrients. Examples of whole-grain foods include oats, whole wheat bread, and brown rice. · Buy whole-grain breads and cereals, instead of white bread or pastries. Limit salt and sodium  · Limit how much salt and sodium you eat to help lower your blood pressure. · Taste food before you salt it. Add only a little salt when you think you need it. With time, your taste buds will adjust to less salt. · Eat fewer snack items, fast foods, and other high-salt, processed foods. Check food labels for the amount of sodium in packaged foods. · Choose low-sodium versions of canned goods (such as soups, vegetables, and beans). Limit sugar  · Limit drinks and foods with added sugar. These include candy, desserts, and soda pop. Limit alcohol  · Limit alcohol to no more than 2 drinks a day for men and 1 drink a day for women. Too much alcohol can cause health problems. When should you call for help? Watch closely for changes in your health, and be sure to contact your doctor if:    · You would like help planning heart-healthy meals. Where can you learn more? Go to http://sebastian-ran.info/. Enter V137 in the search box to learn more about \"Heart-Healthy Diet: Care Instructions. \"  Current as of: July 22, 2018  Content Version: 11.9  © 9500-8151 [employer] Care instructions adapted under license by [employer] (which disclaims liability or warranty for this information).  If you have questions about a medical condition or this instruction, always ask your healthcare professional. [address] any warranty or liability for your use of this information. Patient Education        Avoiding Triggers With Heart Failure: Care Instructions  Your Care Instructions    Triggers are anything that make your heart failure flare up. A flare-up is also called \"sudden heart failure\" or \"acute heart failure. \" When you have a flare-up, fluid builds up in your lungs, and you have problems breathing. You might need to go to the hospital. By watching for changes in your condition and avoiding triggers, you can prevent heart failure flare-ups. Follow-up care is a key part of your treatment and safety. Be sure to make and go to all appointments, and call your doctor if you are having problems. It's also a good idea to know your test results and keep a list of the medicines you take. How can you care for yourself at home? Watch for changes in your weight and condition  · Weigh yourself without clothing at the same time each day. Record your weight. Call your doctor if you have sudden weight gain, such as more than 2 to 3 pounds in a day or 5 pounds in a week. (Your doctor may suggest a different range of weight gain.) A sudden weight gain may mean that your heart failure is getting worse. · Keep a daily record of your symptoms. Write down any changes in how you feel, such as new shortness of breath, cough, or problems eating. Also record if your ankles are more swollen than usual and if you feel more tired than usual. Note anything that you ate or did that could have triggered these changes. Limit sodium  Sodium causes your body to hold on to extra water. This may cause your heart failure symptoms to get worse. People get most of their sodium from processed foods. Fast food and restaurant meals also tend to be very high in sodium.   · Your doctor may suggest that you limit sodium to 2,000 milligrams (mg) a day or less. That is less than 1 teaspoon of salt a day, including all the salt you eat in cooking or in packaged foods. · Read food labels on cans and food packages. They tell you how much sodium you get in one serving. Check the serving size. If you eat more than one serving, you are getting more sodium. · Be aware that sodium can come in forms other than salt, including monosodium glutamate (MSG), sodium citrate, and sodium bicarbonate (baking soda). MSG is often added to Asian food. You can sometimes ask for food without MSG or salt. · Slowly reducing salt will help you adjust to the taste. Take the salt shaker off the table. · Flavor your food with garlic, lemon juice, onion, vinegar, herbs, and spices instead of salt. Do not use soy sauce, steak sauce, onion salt, garlic salt, mustard, or ketchup on your food, unless it is labeled \"low-sodium\" or \"low-salt. \"  · Make your own salad dressings, sauces, and ketchup without adding salt. · Use fresh or frozen ingredients, instead of canned ones, whenever you can. Choose low-sodium canned goods. · Eat less processed food and food from restaurants, including fast food. Exercise as directed  Moderate, regular exercise is very good for your heart. It improves your blood flow and helps control your weight. But too much exercise can stress your heart and cause a heart failure flare-up. · Check with your doctor before you start an exercise program.  · Walking is an easy way to get exercise. Start out slowly. Gradually increase the length and pace of your walk. Swimming, riding a bike, and using a treadmill are also good forms of exercise. · When you exercise, watch for signs that your heart is working too hard. You are pushing yourself too hard if you cannot talk while you are exercising. If you become short of breath or dizzy or have chest pain, stop, sit down, and rest.  · Do not exercise when you do not feel well.   Take medicines correctly  · Take your medicines exactly as prescribed. Call your doctor if you think you are having a problem with your medicine. · Make a list of all the medicines you take. Include those prescribed to you by other doctors and any over-the-counter medicines, vitamins, or supplements you take. Take this list with you when you go to any doctor. · Take your medicines at the same time every day. It may help you to post a list of all the medicines you take every day and what time of day you take them. · Make taking your medicine as simple as you can. Plan times to take your medicines when you are doing other things, such as eating a meal or getting ready for bed. This will make it easier to remember to take your medicines. · Get organized. Use helpful tools, such as daily or weekly pill containers. When should you call for help? Call 911 if you have symptoms of sudden heart failure such as:    · You have severe trouble breathing.     · You cough up pink, foamy mucus.     · You have a new irregular or rapid heartbeat.    Call your doctor now or seek immediate medical care if:    · You have new or increased shortness of breath.     · You are dizzy or lightheaded, or you feel like you may faint.     · You have sudden weight gain, such as more than 2 to 3 pounds in a day or 5 pounds in a week. (Your doctor may suggest a different range of weight gain.)     · You have increased swelling in your legs, ankles, or feet.     · You are suddenly so tired or weak that you cannot do your usual activities.    Watch closely for changes in your health, and be sure to contact your doctor if you develop new symptoms. Where can you learn more? Go to http://sebastian-ran.info/. Enter E648 in the search box to learn more about \"Avoiding Triggers With Heart Failure: Care Instructions. \"  Current as of: July 22, 2018  Content Version: 11.9  © 6387-7206 [employer].  Care instructions adapted under license by [facility] (which disclaims liability or warranty for this information). If you have questions about a medical condition or this instruction, always ask your healthcare professional. [place] [address] any warranty or liability for your use of this information. Patient Education   Furosemide (By mouth)   Furosemide (jpax-IN-rj-mide)  Treats fluid retention (edema) and high blood pressure. This medicine is a diuretic (water pill). Brand Name(s): Active-Medicated Specimen Collection Kit, Diuscreen Multi-Drug Medicated Test Kit, Lasix, RX-Specimen Collection Kit, Specimen Collection Kit   There may be other brand names for this medicine. When This Medicine Should Not Be Used: This medicine is not right for everyone. Do not use it if you had an allergic reaction to furosemide. How to Use This Medicine:   Liquid, Tablet  · Take your medicine as directed. Your dose may need to be changed several times to find what works best for you. · You may take this medicine with food if it upsets your stomach. · Oral liquid: Measure the oral liquid medicine with a marked measuring spoon, oral syringe, or medicine cup. · Tablet: Swallow the tablet whole. Do not crush, break, or chew it. · Missed dose: Take a dose as soon as you remember. If it is almost time for your next dose, wait until then and take a regular dose. Do not take extra medicine to make up for a missed dose. · Store the medicine in a closed container at room temperature, away from heat, moisture, and direct light. Drugs and Foods to Avoid:   Ask your doctor or pharmacist before using any other medicine, including over-the-counter medicines, vitamins, and herbal products. · Some medicines can affect how furosemide works.  Tell your doctor if you are also using any of the following:  ¨ Cisplatin, cyclosporine, digoxin, ethacrynic acid, licorice, lithium, methotrexate, or phenytoin  ¨ Adrenocorticotropic hormone (ACTH)  ¨ Laxative  ¨ Medicine to treat an infection  ¨ NSAID pain or arthritis medicine (including aspirin, diclofenac, ibuprofen, indomethacin, naproxen)  ¨ Other blood pressure medicines  ¨ Steroid medicine (including dexamethasone, hydrocortisone, methylprednisolone, prednisolone, prednisone)  ¨ Thyroid medicine  · If you also take sucralfate, allow at least 2 hours between the time you take furosemide and the time you take sucralfate. · Alcohol, narcotic pain medicine, or sleeping pills may cause you to feel more lightheaded, dizzy, or faint when used with this medicine. Warnings While Using This Medicine:   · Tell your doctor if you are pregnant or breastfeeding, or if you have kidney disease, liver disease (including cirrhosis), diabetes, gout, low blood pressure, lupus, an enlarged prostate, trouble urinating, or an allergy to sulfa drugs. Tell your doctor if you are on a low-salt diet. · This medicine may cause the following problems:   ¨ Low levels of minerals in your blood, such as potassium and sodium  ¨ Blood sugar level changes  ¨ Hearing problems  · Make sure any doctor or dentist who treats you knows that you are using this medicine. · This medicine could lower your blood pressure too much, especially when you first use it or if you are dehydrated. Stand or sit up slowly if you feel lightheaded or dizzy. · This medicine may make your skin more sensitive to sunlight. Wear sunscreen. Do not use sunlamps or tanning beds. · Your doctor will do lab tests at regular visits to check on the effects of this medicine. Keep all appointments. · Keep all medicine out of the reach of children. Never share your medicine with anyone.   Possible Side Effects While Using This Medicine:   Call your doctor right away if you notice any of these side effects:  · Allergic reaction: Itching or hives, swelling in your face or hands, swelling or tingling in your mouth or throat, chest tightness, trouble breathing  · Blistering, peeling, red skin rash  · Confusion, weakness, muscle twitching  · Dry mouth, increased thirst, muscle cramps, uneven heartbeat  · Sudden and severe stomach pain, nausea, vomiting, fever, lightheadedness  · Hearing loss, ringing in the ears  · Lightheadedness, dizziness, fainting  · Severe diarrhea  · Unusual bleeding or bruising  · Yellow skin or eyes  If you notice these less serious side effects, talk with your doctor:   · Loss of appetite, stomach cramps  If you notice other side effects that you think are caused by this medicine, tell your doctor. Call your doctor for medical advice about side effects. You may report side effects to FDA at 4-861-MGT-5243  © 2017 [facility] [place] Information is for End User's use only and may not be sold, redistributed or otherwise used for commercial purposes. The above information is an  only. It is not intended as medical advice for individual conditions or treatments. Talk to your doctor, nurse or pharmacist before following any medical regimen to see if it is safe and effective for you. DISCHARGE SUMMARY from Nurse    PATIENT INSTRUCTIONS:    After general anesthesia or intravenous sedation, for 24 hours or while taking prescription Narcotics:  · Limit your activities  · Do not drive and operate hazardous machinery  · Do not make important personal or business decisions  · Do  not drink alcoholic beverages  · If you have not urinated within 8 hours after discharge, please contact your surgeon on call.     Report the following to your surgeon:  · Excessive pain, swelling, redness or odor of or around the surgical area  · Temperature over 100.5  · Nausea and vomiting lasting longer than 4 hours or if unable to take medications  · Any signs of decreased circulation or nerve impairment to extremity: change in color, persistent  numbness, tingling, coldness or increase pain  · Any questions    What to do at Home:  Recommended activity: Activity as tolerated,     If you experience any of the following symptoms chest pain, shortness of breath, please follow up with [facility]. *  Please give a list of your current medications to your Primary Care Provider. *  Please update this list whenever your medications are discontinued, doses are      changed, or new medications (including over-the-counter products) are added. *  Please carry medication information at all times in case of emergency situations. These are general instructions for a healthy lifestyle:    No smoking/ No tobacco products/ Avoid exposure to second hand smoke  Surgeon General's Warning:  Quitting smoking now greatly reduces serious risk to your health. Obesity, smoking, and sedentary lifestyle greatly increases your risk for illness    A healthy diet, regular physical exercise & weight monitoring are important for maintaining a healthy lifestyle    You may be retaining fluid if you have a history of heart failure or if you experience any of the following symptoms:  Weight gain of 3 pounds or more overnight or 5 pounds in a week, increased swelling in our hands or feet or shortness of breath while lying flat in bed. Please call your doctor as soon as you notice any of these symptoms; do not wait until your next office visit. Recognize signs and symptoms of STROKE:    F-face looks uneven    A-arms unable to move or move unevenly    S-speech slurred or non-existent    T-time-call 911 as soon as signs and symptoms begin-DO NOT go       Back to bed or wait to see if you get better-TIME IS BRAIN. Warning Signs of HEART ATTACK     Call 911 if you have these symptoms:   Chest discomfort. Most heart attacks involve discomfort in the center of the chest that lasts more than a few minutes, or that goes away and comes back. It can feel like uncomfortable pressure, squeezing, fullness, or pain.    Discomfort in other areas of the upper body. Symptoms can include pain or discomfort in one or both arms, the back, neck, jaw, or stomach.  Shortness of breath with or without chest discomfort.  Other signs may include breaking out in a cold sweat, nausea, or lightheadedness. Don't wait more than five minutes to call 911 - MINUTES MATTER! Fast action can save your life. Calling 911 is almost always the fastest way to get lifesaving treatment. Emergency Medical Services staff can begin treatment when they arrive -- up to an hour sooner than if someone gets to the hospital by car. The discharge information has been reviewed with the patient. The spouse verbalized understanding. Discharge medications reviewed with the patient and appropriate educational materials and side effects teaching were provided.   ___________________________________________________________________________________________________________________________________

## 2019-04-18 NOTE — PROGRESS NOTES
Verbal bedside report given to Nayely, oncoming RN. Patient's situation, background, assessment and recommendations provided. Opportunity for questions provided. Oncoming RN assumed care of patient.

## 2019-04-18 NOTE — PROGRESS NOTES
K=3.0. Discussed replacement with Lasandra Moritz, NP. IV and Po replacement ordered. Patient can d/c after IV completed. Pt up ad [place] up no distress. Steady gait observed. Pt completes full lap of unit once without problems. Has been seen in hallway a few other times as well this morning. Reporting no difficulties. Feels okay to d/c home after lunch.

## 2019-04-18 NOTE — PROGRESS NOTES
Care Management Interventions  PCP Verified by CM: Yes  Transition of Care Consult (CM Consult): Discharge Planning  Discharge Durable Medical Equipment: No  Physical Therapy Consult: No  Occupational Therapy Consult: No  Speech Therapy Consult: No  Current Support Network: Lives with Spouse, Own Home  Confirm Follow Up Transport: Family  Plan discussed with Pt/Family/Caregiver: Yes  Freedom of Choice Offered: Yes  Discharge Location  Discharge Placement: Home      DC home, no CM needs noted.

## 2019-04-23 ENCOUNTER — HOSPITAL ENCOUNTER (OUTPATIENT)
Dept: LAB | Age: 79
Discharge: HOME | End: 2019-04-23
Payer: MEDICARE

## 2019-04-23 PROBLEM — I50.33 ACUTE ON CHRONIC DIASTOLIC CONGESTIVE HEART FAILURE (HCC): Status: ACTIVE | Noted: 2019-04-23

## 2019-04-23 LAB
ANION GAP SERPL CALC-SCNC: 7 MMOL/L
BUN SERPL-MCNC: 25 MG/DL (ref 8–23)
CALCIUM SERPL-MCNC: 9 MG/DL (ref 8.3–10.4)
CHLORIDE SERPL-SCNC: 105 MMOL/L (ref 98–107)
CO2 SERPL-SCNC: 28 MMOL/L (ref 21–32)
CREAT SERPL-MCNC: 2.2 MG/DL (ref 0.8–1.5)
GLUCOSE SERPL-MCNC: 98 MG/DL (ref 65–100)
POTASSIUM SERPL-SCNC: 4 MMOL/L (ref 3.5–5.1)
SODIUM SERPL-SCNC: 140 MMOL/L (ref 136–145)

## 2019-04-23 PROCEDURE — 80048 BASIC METABOLIC PNL TOTAL CA: CPT

## 2019-04-23 PROCEDURE — 36415 COLL VENOUS BLD VENIPUNCTURE: CPT

## 2019-04-23 NOTE — H&P (VIEW-ONLY)
Dr. Elida Nascimento MD 
3 0528 [facility] Luann Boyd. [address] 
[phone] 
  
Patient Name:  Grace Garcia YOB: 1940 
  
Date of Visit: 3/28/2019 
  
CHIEF COMPLAINT:   
   
Chief Complaint Patient presents with  New Patient  Lung Nodule  
  
  
HISTORY OF PRESENT ILLNESS:   
Grace Garcia is a 69y.o. year-old gentleman with a 150-pack-year smoking history (quit 2001), rheumatoid arthritis on prednisone 5 mg, AAA, CAD, AS s/p TAVR who was referred by Dr. Cindy Pantoja for evaluation of pulmonary nodule. A cardiac CT was performed on January 24th in which the radiologist noted 2 adjacent right upper lobe nodules measuring 1.4 and 1.3 cm. He recently underwent TAVR and PPM implantation placement in Feb 2019 and has a plan to undergo ophthalmologic surgery in May 2019. 
  
The patient reports a history notable for COPD for which she has been prescribed 2 L/min of supplemental oxygen at night and currently uses Breo 200mcg. He was previously on the Anoro Ellipta inhaler but had concerns about glaucoma that prompted the switch to [facility]. His primary symptoms are shortness of breath with exertion. He rarely coughs or wheezes. Almost any exertion prompts dyspnea. He was prescribed oxygen after hospitalization for kidney stones last year and continues to use nocturnal oxygen but does not use with exertion. He has not been hospitalized for pneumonia nor been vaccinated against.  Complete pulmonary function testing was performed in January and was suggestive of mild gold stage II COPD. ASSESSMENT & PLAN:  (Medical Decision Making) José calculator suggests risks of this nodule are as high as 29%, intermediate risk category. Rheumatoid nodule also possible. Intermediate risk category requires further workup including PET and/or biopsy. 
  
    ICD-10-CM ICD-9-CM    
1. Pulmonary nodule:  PET- CT needed and will arrange.   Malignancy risk is intermediate and there is also rheumatoid nodule in the differential diagnosis. We discussed options of biopsy vs directly to resection depending upon PET result. R91.1 793.11    
2. Tobacco use: increasing likelihood of malignancy. Z72.0 305.1 AMB POC SPIROMETRY 3. Encounter for immunization: Prevnar 13 administered today Z23 V03.89 PNEUMOCOCCAL CONJ VACCINE 13 VALENT IM 4. Stage 2 moderate COPD by GOLD classification ([facility]) J44.9 496 umeclidinium-vilanterol (ANORO ELLIPTA) 62.5-25 mcg/actuation inhaler The patient was educated about the pathophysiology of COPD. We also discussed several treatment concepts listed below: 1. Smoking cessation-the vital impact of smoking cessation/abstinence was emphasized. 2. Pneumococcal vaccination (Pneumovax and [id number]) as well as influenza vaccination were recommended. 3. Supplemental oxygen and its role in hypoxemia were discussed. Ambulating oximetry today 4. Alpha-1 antitrypsin testing was recommended and was performed. 5.  Pulmonary rehab and its benefits improving exercise capacity were discussed. Will refer. 6.  Bronchodilator therapy- Educated the patient about the role of bronchodilators for long-term improvement in symptoms, exercise capacity and airflow limitation. The specific uses of short-term & long-term bronchodilators were reviewed, as well as the practical importance of choosing agents covered by insurance (large cost difference). Educational materials were provided to reinforce what was learned today. 
      REFERRAL TO PULMONARY REHAB Recommend Anoro and prn albuterol     AMB POC PULSE OXIMETRY, MULTIPLE  
      ALPHA-1-ANTITRYPSIN, TOTAL  
  
F/u in one month at [facility] Pulmonary with NP. 
  
   
Orders Placed This Encounter  AMB POC SPIROMETRY  PET/CT TUMOR IMAGE SKULL THIGH W (INI)  PNEUMOCOCCAL CONJ VACCINE 13 VALENT IM (Age 48 and over)  ALPHA-1-ANTITRYPSIN, TOTAL  REFERRAL TO PULMONARY REHAB  
  AMB POC PULSE OXIMETRY, MULTIPLE  umeclidinium-vilanterol [facility] ELLIPTA) 62.5-25 mcg/actuation inhaler  
  
Today's visit April 23, 2019: This is a hospital follow-up, patient was admitted recently for volume overload and decompensated congestive heart failure for which she was treated with diuretics. We had seen the patient in the hospital due to his PET positive nodule the patient has already as noted below plans for further work-up. His case was recently discussed in multidisciplinary thoracic tumor board and the conclusion, due to his cardiac pathology he is not a surgical candidate and therefore we will plan on navigational bronchoscopy with fiducial marker placement at the same time along with endobronchial ultrasound-guided fine-needle aspiration of mediastinal lymph nodes for staging. Since his hospital discharge his CHF is now compensated he does not have peripheral edema no orthopnea and his shortness of breath is at baseline. 
 
 
  
    
Past Medical History:  
Diagnosis Date  AAA (abdominal aortic aneurysm) without rupture ([facility])    
  5.1 cm infrarenal abdominal aortic aneurysm; followed by=Aidan Price MD    
 Adverse effect of anesthesia    
  stopped breathing during gallbladder surgery in [facility]; had another surgery right after gallbladder surgery at [facility] and they all most lost him per patient's wife; no complications with recent surgeries in 2018 at [facility]  CAD (coronary artery disease)    
  denies MI; no stents; hx. of CABG;  
 Chronic kidney disease    
  no nephrologist; stage 3- pt denies any kidney failure, only kidney stones  Chronic pain    
  due to neuropathy  COPD (chronic obstructive pulmonary disease) ([facility])    
  nebulizer prn; rescue inhaler prn  SANCHEZ (dyspnea on exertion)    
 Emphysema lung ([facility])    
 Former smoker    
 GERD (gastroesophageal reflux disease)    
  tums prn   
 Heart murmur    
   \"I was diagnosed at long time ago\";   
 Hemorrhoid    
 High cholesterol    
  no meds  History of kidney stones    
  multiple-surgical intervention and naturally pass  History of sepsis 10/2018  
  related to kidney stone  History of skin cancer    
 Hx of CABG 2005  Hypertension    
  was taken off BP meds when septic in 10/2018 and hasn't taken since  Long term (current) use of systemic steroids    
  Prednisone  Lung nodule    
 MRSA (methicillin resistant staph aureus) culture positive    
  positive nasal swab collected on PAT  MSSA (methicillin susceptible Staphylococcus aureus)    
  positive MSSA nasal swab collected at PAT  Nephrolithiasis    
 Neuropathy    
  feet and hands  On home oxygen therapy    
  2L/NC at bedtime and during the day prn for sob  RA (rheumatoid arthritis) ([address]    
  managed with Remicade  Severe aortic stenosis    
 Tattoo    
  Left FA  
  
  
     
Past Surgical History:  
Procedure Laterality Date  HX APPENDECTOMY   13years old  HX CATARACT REMOVAL Bilateral    
 HX CHOLECYSTECTOMY      
 HX COLONOSCOPY      
 HX CORONARY ARTERY BYPASS GRAFT   2005?  
  3 vessel  HX HEART CATHETERIZATION   05/06/2014  
  no stents  HX HEENT Right 04/2018  
  eye surgery for glaucoma  HX UROLOGICAL   10/24/2018  
  CYSTOURETHROSCOPY,W/URETEROSCOPY/PYELOSCOPY; W/LITHOTRIPSY INCLUDING INSERTION INDWELLING URETERAL STENT** CYSTOSCOPY, BILATERAL URETEROSCOPY, LASER LITHOTRIPSY, BILATERAL RETROGRADE PYELOGRAM, WITH BILATERAL URETERAL STENT EXCHANGE**  
 HX UROLOGICAL   10/03/2018  
  CYSTOURETHROSCOPY, W/INSERTION INDWELLING URETERAL STENT ([facility])  HX UROLOGICAL   09/19/2018  
  [place] CYSTO/URETERO W/LITHOTRIPSY &INDWELL STENT INSRT    
  
  
Social History  
  
     
Socioeconomic History  Marital status:   
    Spouse name: Not on file  Number of children: Not on file  Years of education: Not on file  Highest education level: Not on file Occupational History  Not on file Social Needs  Financial resource strain: Not on file  Food insecurity:  
    Worry: Not on file  
    Inability: Not on file  Transportation needs:  
    Medical: Not on file  
    Non-medical: Not on file Tobacco Use  Smoking status: Former Smoker  
    Packs/day: 3.00  
    Years: 50.00  
    Pack years: 150.00  
    Types: Cigarettes  
    Last attempt to quit: 1/3/2004  
    Years since quitting: 15.2  Smokeless tobacco: Never Used Substance and Sexual Activity  Alcohol use: No  
    Frequency: Never  Drug use: No  
 Sexual activity: Not on file Lifestyle  Physical activity:  
    Days per week: Not on file  
    Minutes per session: Not on file  Stress: Not on file Relationships  Social connections:  
    Talks on phone: Not on file  
    Gets together: Not on file  
    Attends [employer] service: Not on file  
    Active member of club or organization: Not on file  
    Attends meetings of clubs or organizations: Not on file  
    Relationship status: Not on file  Intimate partner violence:  
    Fear of current or ex partner: Not on file  
    Emotionally abused: Not on file  
    Physically abused: Not on file  
    Forced sexual activity: Not on file Other Topics Concern  Not on file Social History Narrative  Not on file  
  
  
     
Family History Problem Relation Age of Onset  Kidney Disease Mother    
 Diabetes Mother    
 Heart Attack Father    
 Liver Disease Sister    
 Heart Disease Brother    
 Heart Attack Brother    
  
  
Allergies Allergen Reactions  Methotrexate Other (comments)  
    \"hemorrhage\" per wife  
  
  
    
Current Outpatient Medications Medication Sig  
 OXYGEN-AIR DELIVERY SYSTEMS by Does Not Apply route. 2lpm on excertion  umeclidinium-vilanterol (ANORO ELLIPTA) 62.5-25 mcg/actuation inhaler Take 1 Puff by inhalation daily.  clopidogrel (PLAVIX) 75 mg tab Take 1 Tab by mouth daily.  amLODIPine (NORVASC) 10 mg tablet Take 1 Tab by mouth daily.  aspirin delayed-release 81 mg tablet Take 81 mg by mouth daily.  predniSONE (DELTASONE) 5 mg tablet Take 5 mg by mouth daily.  infliximab (REMICADE IV) by IntraVENous route. Every 7-8 weeks; last infusion 12/19/18  travoprost (TRAVATAN Z) 0.004 % ophthalmic solution Administer 1 Drop to left eye nightly.  brimonidine (ALPHAGAN P) 0.1 % ophthalmic solution Administer 1 Drop to left eye nightly.  fluticasone-vilanterol (BREO ELLIPTA) 200-25 mcg/dose inhaler Take 1 Puff by inhalation daily. .  Indications: Controller Medication for Asthma  
 albuterol (PROVENTIL VENTOLIN) 2.5 mg /3 mL (0.083 %) nebulizer solution 2.5 mg by Nebulization route three (3) times daily as needed for Wheezing or Shortness of Breath. Take / use AM day of surgery  per anesthesia protocols if needed.  tamsulosin (FLOMAX) 0.4 mg capsule Take 0.4 mg by mouth daily.  albuterol (PROAIR HFA) 90 mcg/actuation inhaler Take 2 Puffs by inhalation every four (4) hours as needed for Wheezing or Shortness of Breath. Take / use AM day of surgery  per anesthesia protocols.  cholecalciferol (VITAMIN D3) 1,000 unit tablet Take 1,000 Units by mouth daily.  acetaminophen (TYLENOL) 325 mg cap Take 1,000 mg by mouth nightly as needed for Pain.  HYDROcodone-acetaminophen (NORCO) 7.5-325 mg per tablet Take 1 Tab by mouth nightly as needed for Pain.  
  
No current facility-administered medications for this visit.   
  
 Review of Systems Constitutional: Negative for chills, diaphoresis, fever, malaise/fatigue and weight loss. Cardiovascular: Positive for leg swelling. Negative for chest pain, palpitations, claudication and PND. Gastrointestinal: Negative for abdominal pain, blood in stool, constipation, diarrhea, heartburn, nausea and vomiting. Neurological: Negative for dizziness, tremors, seizures, weakness and headaches.  
 
 
 
  
  
  
PHYSICAL EXAM: 
   
Vitals:  
  03/28/19 1020 BP: 158/60 Pulse: 78 Resp: 16 Temp: 99.5 °F (37.5 °C) TempSrc: Temporal  
SpO2: 100% Comment: r/a Weight: 187 lb (84.8 kg) Height: 5' 10.5\" (1.791 m)  
  Body mass index is 26.45 kg/m². 
  
  
General Appearance: The patient is pleasant and in no respiratory distress. HEENT: PERRLA. Conjunctivae unremarkable. Nasal mucosa is without epistaxis, exudate, or polyps. Gums and dentition are unremarkable. There is no oropharyngeal narrowing. TMs are clear. Neck/Lymphatic:  Symmetrical with no elevation of jugular venous pulsation. Trachea midline. No thyroid enlargement. No cervical adenopathy. Lungs:  Normal respiratory effort with symmetrical lung expansion. Breath sounds diminished bilaterally. Heart: Regular rate and rhythm no peripheral edema Abdomen:  Soft and non-tender. No hepatosplenomegaly. Bowel sounds are normal.   
Extremity: Trace bilateral lower extremity edema, no clubbing Musculoskeletal:  No weakness, normal muscle tone and bulk. Neuro: The patient is alert and oriented to person, place, and time. Memory appears intact and mood is normal.  No gross sensorimotor deficits are present. 
  
DIAGNOSTIC TESTS: 
  
Spirometry: Mild Gold stage I COPD, could likely tolerate RU lobectomy if needed. 
  
  1/24/2019 3/28/2019    
FVC  3.35 (80%)  3.68 (87%)    
FEV1  1.93 (71%)  2.16 (71%)    
FEV1/FVC  0.58  0.59    
TLC  5.89 (92%)      
FRC  3.49 (95%)      
RV  2.49 (93%)      
DLCO  11.4 (57%)      
  
Exercise oximetry:   
CT coronaries 1/24/19 I PETCT: 
 
IMPRESSION Impression: 1. Unchanged small spiculated right upper lobe nodules, the more superior is 
associated with focal intense FDG uptake, suspicious for a small primary 
bronchogenic carcinoma. 2.  New mild mediastinal lymphadenopathy, without significant FDG uptake, 
nonspecific. New trace bilateral pleural effusions. 3.  No evidence of distant metastatic disease. 4.  Other chronic findings as above. NTERPRETATION:   
  
Coronary Angiography:  
  
1. The left main arises from the left sinus of Valsalva and has high-grade 
calcified plaque with 80% stenosis. Katelin Barrio 2. The left anterior descending branches from the left main and is occluded in 
the proximal segment. The ramus intermedius has a 50% narrowing. Katelin Barrio 3. The left circumflex branches from the left main and is occluded. 4. The dominant right coronary artery arises from the right sinus of Valsalva 
and is patent, giving rise to a PDA and PLB. . 
  
1. The LIMA to the LAD is patent. 2. The vein graft to the obtuse marginal is patent. 3. The vein graft to the ramus intermedius is patent. 
  
Left Ventricle: Normal size, LVH. Katelin Barrio Left Atrium: Moderately enlarged; there are 3 right pulmonary veins and 2 left 
pulmonary veins. The left atrial appendage is free of thrombus. There is a small 
PFO. Katelin Barrio Right Ventricle: Normal size. Right Atrium: Normal size. Katelin Barrio Pericardium: Normal.. Aorta: Normal dimensions with moderate plaque. Pulmonary Artery: Normal dimensions without filling defects. Non aortic valves: Moderate mitral annular calcification. Mediastinum / Lung Fields: Emphysematous changes with nodules in the right upper 
lobe. See radiology over read. Please see radiology over read.  
  
 Aorto-annular complex: 
  
1. The aortic annulus is best visualized in the 35% phase interval.  
    Area: 467 sq mm sq mm. Minimum diameter: 20.4 mm, maximum diameter: 27.9 
    Equivalent diameter: 24.4 mm. Perimeter: 79.1 mm. 
    Annulus calcification: None. Aortic valve calcification: Moderate 2. LVOT area: 474 sq mm. 
    LVOT calcification: None. 3. SOV diameters: right 32.8 mm, left 33.5 mm, non 34.0 mm. 
4. SOV heights:  non 24.9 mm. 5. STJ diameter: 29.5 x 30.4 mm. 6. Ascending aorta dimensions: 32.6 x 32.7 mm. 
    Calcification: Moderate. 7. Left main height: 10.1 mm. RCA height: 17.0 mm. 
8. Coaxial planes: Greek 2, caudal 3. 
  
IMPRESSION:  
  
1. Severe aortic stenosis. Annulus area: 467 sq mm LVOT area: 474 sq mm 2. LM height: 10.1 mm. RCA height: 17.0 mm. 
3. Recommend size 26 valve 4. Severe native coronary artery disease with 3 out of 3 patent grafts and a 
patent RCA. 5. Small PFO. 
  
Cole Martinez MD 
 
 
 
AMBULATING OXIMETRY: 3/28/2019 O2 sat HR Room air at Rest  100 %  102 bpm  
Room air ambulating  93 %  122 bpm  
  
  
ASSESSMENT & PLAN:  (Medical Decision Making) José calculator suggests risks of this nodule are as high as 29%, intermediate risk category. Rheumatoid nodule also possible. Intermediate risk category requires further workup including PET and/or biopsy. 
  
    ICD-10-CM ICD-9-CM    
1. Pulmonary nodule:   
Nodule is spiculated and PET positive, his case was discussed in tumor board and the decision was to perform navigation bronchoscopy with biopsy and at the same time to place fiducial markers as the patient is a poor surgical candidate due to high cardiac risk. The patient is agreeable to this, with navigation bronchoscopy, EBUS and fiducial marker placement was discussed with him and his wife and they are both agreeable. He will need to stop his Plavix 5 days prior to the procedure which has been scheduled for 9 May 2019 R91.1 793.11    
2. Tobacco use: increasing likelihood of malignancy. Z72.0 305.1 AMB POC SPIROMETRY 3. Stage 2 moderate COPD by GOLD classification ([facility]) J44.9 496 umeclidinium-vilanterol (ANORO ELLIPTA) 62.5-25 mcg/actuation inhaler T on appropriate therapy and not exacerbated 
      REFERRAL TO PULMONARY REHAB Recommend Anoro and prn albuterol     AMB POC PULSE OXIMETRY, MULTIPLE  
      ALPHA-1-ANTITRYPSIN, TOTAL  
  
 
  
   
Orders Placed This Encounter  AMB POC SPIROMETRY  PET/CT TUMOR IMAGE SKULL THIGH W (INI)  PNEUMOCOCCAL CONJ VACCINE 13 VALENT IM (Age 48 and over)  ALPHA-1-ANTITRYPSIN, TOTAL  REFERRAL TO PULMONARY REHAB  
 AMB POC PULSE OXIMETRY, MULTIPLE  umeclidinium-vilanterol (ANORO ELLIPTA) 62.5-25 mcg/actuation inhaler  
  
              
 
  
  
  
 
 navigational bronchoscopy with fiducial marker placement and biopsy along with EBUS scheduled for May 8. Follow-up in the office in 3 months for COPD.  
 
Darlene Hartley MD.

## 2019-05-02 ENCOUNTER — HOSPITAL ENCOUNTER (OUTPATIENT)
Dept: LAB | Age: 79
Discharge: HOME | End: 2019-05-02
Attending: INTERNAL MEDICINE
Payer: MEDICARE

## 2019-05-02 DIAGNOSIS — I50.32 CHRONIC DIASTOLIC CONGESTIVE HEART FAILURE (HCC): Chronic | ICD-10-CM

## 2019-05-02 LAB
ANION GAP SERPL CALC-SCNC: 9 MMOL/L (ref 7–16)
BUN SERPL-MCNC: 27 MG/DL (ref 8–23)
CALCIUM SERPL-MCNC: 9.2 MG/DL (ref 8.3–10.4)
CHLORIDE SERPL-SCNC: 107 MMOL/L (ref 98–107)
CO2 SERPL-SCNC: 26 MMOL/L (ref 21–32)
CREAT SERPL-MCNC: 2 MG/DL (ref 0.8–1.5)
GLUCOSE SERPL-MCNC: 101 MG/DL (ref 65–100)
MAGNESIUM SERPL-MCNC: 2.4 MG/DL (ref 1.8–2.4)
POTASSIUM SERPL-SCNC: 3.9 MMOL/L (ref 3.5–5.1)
SODIUM SERPL-SCNC: 142 MMOL/L (ref 136–145)

## 2019-05-02 PROCEDURE — 83735 ASSAY OF MAGNESIUM: CPT

## 2019-05-02 PROCEDURE — 80048 BASIC METABOLIC PNL TOTAL CA: CPT

## 2019-05-02 PROCEDURE — 36415 COLL VENOUS BLD VENIPUNCTURE: CPT

## 2019-05-08 ENCOUNTER — HOSPITAL ENCOUNTER (OUTPATIENT)
Dept: SURGERY | Age: 79
Discharge: HOME | End: 2019-05-08
Attending: OPHTHALMOLOGY

## 2019-05-08 VITALS
HEART RATE: 79 BPM | DIASTOLIC BLOOD PRESSURE: 106 MMHG | SYSTOLIC BLOOD PRESSURE: 184 MMHG | OXYGEN SATURATION: 97 % | WEIGHT: 180 LBS | HEIGHT: 70 IN | BODY MASS INDEX: 25.77 KG/M2 | TEMPERATURE: 97.5 F

## 2019-05-08 NOTE — PERIOP NOTES
Patient verified name and     Type 1B surgery, modified abbreviated walk in assessment completed only through PTA med list.     Dr. Peter Douglas with anesthesia called due to elevated BP today and multiple chronic medical conditions including: recent TAVR surgery with pacemaker placement in 2019, recent hospitalization for CHF in 2019, AAA measuring 5.5x5.1 on CT scan from 2019, recent stage 2 lung cancer diagnosis with Bronchoscopy scheduled for 19. Dr. Peter Douglas came to see patient in person today. He suggests that surgery is cancelled due to size of AAA and recent lung cancer diagnosis. He wants patient to be seen and evaluated by vascular surgery as soon as possible. Patient had vascular surgery appointment in 2019 with [facility] but was unable to go due to hospitalization for CHF. [facility] called per Dr. Carlotta Melton request and spoke with Shahida Summers. Appointment scheduled for 19 at 9:30 am. Patient instructed per Carlos's instructions not to eat or drink anything after midnight the night before. Patient and wife aware of appointment time and location.    Dr. Alexandre Chapin's office called and spoke with her assistant Emil Lipscomb to inform them that surgery once again will need to be cancelled due to medical conditions (lung cancer, AAA)  that need prior attention at this time per anesthesia's request.

## 2019-05-09 ENCOUNTER — APPOINTMENT (OUTPATIENT)
Dept: GENERAL RADIOLOGY | Age: 79
End: 2019-05-09
Attending: INTERNAL MEDICINE
Payer: MEDICARE

## 2019-05-09 ENCOUNTER — HOSPITAL ENCOUNTER (OUTPATIENT)
Age: 79
Setting detail: OUTPATIENT SURGERY
Discharge: HOME | End: 2019-05-09
Attending: INTERNAL MEDICINE | Admitting: INTERNAL MEDICINE
Payer: MEDICARE

## 2019-05-09 ENCOUNTER — APPOINTMENT (OUTPATIENT)
Dept: CT IMAGING | Age: 79
End: 2019-05-09
Attending: INTERNAL MEDICINE
Payer: MEDICARE

## 2019-05-09 VITALS
BODY MASS INDEX: 26.05 KG/M2 | SYSTOLIC BLOOD PRESSURE: 159 MMHG | OXYGEN SATURATION: 90 % | HEART RATE: 99 BPM | TEMPERATURE: 98.7 F | RESPIRATION RATE: 20 BRPM | HEIGHT: 70 IN | DIASTOLIC BLOOD PRESSURE: 74 MMHG | WEIGHT: 182 LBS

## 2019-05-09 DIAGNOSIS — R91.1 PULMONARY NODULE: ICD-10-CM

## 2019-05-09 DIAGNOSIS — C34.11 MALIGNANT NEOPLASM OF UPPER LOBE OF RIGHT LUNG (HCC): ICD-10-CM

## 2019-05-09 PROCEDURE — 31628 BRONCHOSCOPY/LUNG BX EACH: CPT | Performed by: INTERNAL MEDICINE

## 2019-05-09 PROCEDURE — 77030028571 HC CATH ENDOBRNCH DISP BIOP KT SPMD -G1: Performed by: INTERNAL MEDICINE

## 2019-05-09 PROCEDURE — 76040000027: Performed by: INTERNAL MEDICINE

## 2019-05-09 PROCEDURE — A4648 IMPLANTABLE TISSUE MARKER: HCPCS | Performed by: INTERNAL MEDICINE

## 2019-05-09 PROCEDURE — 88342 IMHCHEM/IMCYTCHM 1ST ANTB: CPT

## 2019-05-09 PROCEDURE — 88173 CYTOPATH EVAL FNA REPORT: CPT

## 2019-05-09 PROCEDURE — 71250 CT THORAX DX C-: CPT

## 2019-05-09 PROCEDURE — 99152 MOD SED SAME PHYS/QHP 5/>YRS: CPT | Performed by: INTERNAL MEDICINE

## 2019-05-09 PROCEDURE — 77030003406 HC NDL ASPIR BIOP OCOA -C: Performed by: INTERNAL MEDICINE

## 2019-05-09 PROCEDURE — 31627 NAVIGATIONAL BRONCHOSCOPY: CPT | Performed by: INTERNAL MEDICINE

## 2019-05-09 PROCEDURE — 31654 BRONCH EBUS IVNTJ PERPH LES: CPT | Performed by: INTERNAL MEDICINE

## 2019-05-09 PROCEDURE — 88341 IMHCHEM/IMCYTCHM EA ADD ANTB: CPT

## 2019-05-09 PROCEDURE — 77030031404 HC PTCH SENS SD DISP SPDM -A: Performed by: INTERNAL MEDICINE

## 2019-05-09 PROCEDURE — 77030012699 HC VLV SUC CNTRL OCOA -A: Performed by: INTERNAL MEDICINE

## 2019-05-09 PROCEDURE — 77030021922 HC FCPS BIOP SD DISP SPDM -D: Performed by: INTERNAL MEDICINE

## 2019-05-09 PROCEDURE — 88172 CYTP DX EVAL FNA 1ST EA SITE: CPT

## 2019-05-09 PROCEDURE — 77030009046 HC CATH BRNCH BLLN OCOA -B: Performed by: INTERNAL MEDICINE

## 2019-05-09 PROCEDURE — 88177 CYTP FNA EVAL EA ADDL: CPT

## 2019-05-09 PROCEDURE — 74011250636 HC RX REV CODE- 250/636: Performed by: INTERNAL MEDICINE

## 2019-05-09 PROCEDURE — 31652 BRONCH EBUS SAMPLNG 1/2 NODE: CPT | Performed by: INTERNAL MEDICINE

## 2019-05-09 PROCEDURE — 99153 MOD SED SAME PHYS/QHP EA: CPT | Performed by: INTERNAL MEDICINE

## 2019-05-09 PROCEDURE — 31626 BRONCHOSCOPY W/MARKERS: CPT | Performed by: INTERNAL MEDICINE

## 2019-05-09 PROCEDURE — 74011000250 HC RX REV CODE- 250: Performed by: INTERNAL MEDICINE

## 2019-05-09 PROCEDURE — 74011250636 HC RX REV CODE- 250/636

## 2019-05-09 PROCEDURE — 88305 TISSUE EXAM BY PATHOLOGIST: CPT

## 2019-05-09 RX ORDER — SODIUM CHLORIDE 0.9 % (FLUSH) 0.9 %
5-40 SYRINGE (ML) INJECTION AS NEEDED
Status: DISCONTINUED | OUTPATIENT
Start: 2019-05-09 | End: 2019-05-09 | Stop reason: HOSPADM

## 2019-05-09 RX ORDER — SODIUM CHLORIDE 0.9 % (FLUSH) 0.9 %
5-40 SYRINGE (ML) INJECTION EVERY 8 HOURS
Status: DISCONTINUED | OUTPATIENT
Start: 2019-05-09 | End: 2019-05-09 | Stop reason: HOSPADM

## 2019-05-09 RX ORDER — IPRATROPIUM BROMIDE AND ALBUTEROL SULFATE 2.5; .5 MG/3ML; MG/3ML
3 SOLUTION RESPIRATORY (INHALATION)
Status: COMPLETED | OUTPATIENT
Start: 2019-05-09 | End: 2019-05-09

## 2019-05-09 RX ORDER — FLUMAZENIL 0.1 MG/ML
0.2 INJECTION INTRAVENOUS
Status: DISCONTINUED | OUTPATIENT
Start: 2019-05-09 | End: 2019-05-09 | Stop reason: HOSPADM

## 2019-05-09 RX ORDER — HYDRALAZINE HYDROCHLORIDE 20 MG/ML
10 INJECTION INTRAMUSCULAR; INTRAVENOUS
Status: DISCONTINUED | OUTPATIENT
Start: 2019-05-09 | End: 2019-05-09 | Stop reason: HOSPADM

## 2019-05-09 RX ORDER — LIDOCAINE HYDROCHLORIDE 40 MG/ML
SOLUTION TOPICAL ONCE
Status: COMPLETED | OUTPATIENT
Start: 2019-05-09 | End: 2019-05-09

## 2019-05-09 RX ORDER — LIDOCAINE HYDROCHLORIDE 20 MG/ML
JELLY TOPICAL ONCE
Status: COMPLETED | OUTPATIENT
Start: 2019-05-09 | End: 2019-05-09

## 2019-05-09 RX ORDER — MIDAZOLAM HYDROCHLORIDE 1 MG/ML
.25-5 INJECTION, SOLUTION INTRAMUSCULAR; INTRAVENOUS
Status: DISCONTINUED | OUTPATIENT
Start: 2019-05-09 | End: 2019-05-09 | Stop reason: HOSPADM

## 2019-05-09 RX ORDER — FENTANYL CITRATE 50 UG/ML
50 INJECTION, SOLUTION INTRAMUSCULAR; INTRAVENOUS
Status: DISCONTINUED | OUTPATIENT
Start: 2019-05-09 | End: 2019-05-09 | Stop reason: HOSPADM

## 2019-05-09 RX ORDER — ONDANSETRON 2 MG/ML
4 INJECTION INTRAMUSCULAR; INTRAVENOUS
Status: DISCONTINUED | OUTPATIENT
Start: 2019-05-09 | End: 2019-05-09 | Stop reason: HOSPADM

## 2019-05-09 RX ORDER — NALOXONE HYDROCHLORIDE 0.4 MG/ML
0.2 INJECTION, SOLUTION INTRAMUSCULAR; INTRAVENOUS; SUBCUTANEOUS
Status: DISCONTINUED | OUTPATIENT
Start: 2019-05-09 | End: 2019-05-09 | Stop reason: HOSPADM

## 2019-05-09 RX ADMIN — FENTANYL CITRATE 50 MCG: 50 INJECTION, SOLUTION INTRAMUSCULAR; INTRAVENOUS at 09:10

## 2019-05-09 RX ADMIN — LIDOCAINE HYDROCHLORIDE: 40 SOLUTION TOPICAL at 09:00

## 2019-05-09 RX ADMIN — FENTANYL CITRATE 50 MCG: 50 INJECTION, SOLUTION INTRAMUSCULAR; INTRAVENOUS at 08:55

## 2019-05-09 RX ADMIN — HYDRALAZINE HYDROCHLORIDE 10 MG: 20 INJECTION INTRAMUSCULAR; INTRAVENOUS at 08:55

## 2019-05-09 RX ADMIN — PROMETHAZINE HYDROCHLORIDE 6.25 MG: 25 INJECTION INTRAMUSCULAR; INTRAVENOUS at 09:26

## 2019-05-09 RX ADMIN — FENTANYL CITRATE 50 MCG: 50 INJECTION, SOLUTION INTRAMUSCULAR; INTRAVENOUS at 08:48

## 2019-05-09 RX ADMIN — FENTANYL CITRATE 50 MCG: 50 INJECTION, SOLUTION INTRAMUSCULAR; INTRAVENOUS at 09:25

## 2019-05-09 RX ADMIN — MIDAZOLAM HYDROCHLORIDE 1 MG: 1 INJECTION, SOLUTION INTRAMUSCULAR; INTRAVENOUS at 09:26

## 2019-05-09 RX ADMIN — FENTANYL CITRATE 50 MCG: 50 INJECTION, SOLUTION INTRAMUSCULAR; INTRAVENOUS at 08:51

## 2019-05-09 RX ADMIN — FENTANYL CITRATE 50 MCG: 50 INJECTION, SOLUTION INTRAMUSCULAR; INTRAVENOUS at 09:17

## 2019-05-09 RX ADMIN — MIDAZOLAM HYDROCHLORIDE 2 MG: 1 INJECTION, SOLUTION INTRAMUSCULAR; INTRAVENOUS at 08:48

## 2019-05-09 RX ADMIN — LIDOCAINE HYDROCHLORIDE: 20 JELLY TOPICAL at 09:00

## 2019-05-09 RX ADMIN — IPRATROPIUM BROMIDE AND ALBUTEROL SULFATE 3 ML: .5; 3 SOLUTION RESPIRATORY (INHALATION) at 10:00

## 2019-05-09 NOTE — PROGRESS NOTES
05/09/19 0855   Vital Signs   BP (!) 183/94   MAP (Calculated) 124   Pulse (Heart Rate) 85   Resp Rate 12   Level of Consciousness Responds to Voice   Oxygen Therapy   O2 Sat (%) 98 %   O2 Device Nasal cannula   O2 Flow Rate (L/min) 4 l/min     HYDRALAZINE 10 mg IV given intr-op (see MAR).

## 2019-05-09 NOTE — PROCEDURES
PROCEDURE  Bronchoscopy with Endobronchial Ultrasound Guided Fine Needle Aspiration Of Medistinal Lymph nodes and airway inspection and EMN aided biopsy of peripheral lung nodule. EQUIPMENT:  Olympus T180 bronchoscope, Super Dimension EMN system, 180 deg steareble sheath, Olympus [id number] EBUS scope, UM 17 Radial probe, Super D forceps, Fluoroscopy. INDICATION   Peripheral nodule suspicious for malignancy/staging of presumed malignancy        POST OP DIAGNOSIS:  Super Dimension EMN system was employed to identify and biopsy the RUL nodule seen on CT/PET. 5 forceps biopsies were obtained and evaluated via touch prep and LINO. Touch preps revealed non small cell cancer. 3 additional dedicated samples were sent in tot for histology and IHC    Histology from obtained tissue is currently pending. Following EMN procedure, linear EBUS was performed for mediastinal staging. Stations 7 and 11Rs, were biopsied and negative for malignancy on LINO. Based on CT chest/PET CT / and EBUS pt is a STAGE [T1b,N0,M0] IA2 NSC lung Cancer. Patient is not a surgical candidate due to cardiac and lung disease- fiducial markers x 4 were placed as discussed in tumor board- he will be referred to oncology and radiation oncology. ANESTHESIA  Concious sedation with: Fentanyl 300 mcg IV; Versed 3 mg IV; Lidocaine 220 mg to tracheo-bronchial tree and vocal cords; Phenergan 6.25 mg IV  for nausea and vomiting prophylaxis. Hydralazine 10 mg IV x 1 for intra procedural HTN despite adequate sedation. AIRWAY INSPECTION  After obtaining informed consent, using a bite block, an Olympus T 180 video bronchoscope was  introduced into the trachea through the vocal cords without complication.     RIGHT  LOCATION NORM/ABNORMAL DESCRIPTION   VOCAL CORDS NL    TRACHEA NL    SHAUN NL    RMSB NL    RUL NL    BI NL    RML NL    SUP SEGM RLL NL    MED BASAL NL    ANTERIOR BASAL NL    LATERAL BASAL NL    POSTERIOR BASAL NL LEFT  LOCATION NORMAL/ABNORMAL TYPE   LMSB NL    SUZY NL    LINGULA NL    SUPERIOR DIVISION NL    SUPERIOR SEG LLL NL    RODRIGO-MEDIAL LLL NL    LATERAL LLL NL    POSTERIOR LLL NL                         EBUS  After completing the airway inspection an Olympus  F EBUS bronchoscope was introduced into the trachea through the vocal chords without complication. The balloon was inflated with saline and a mediastinal inspection commenced:      STATION SIZE IN CM   11R sup 1.0/1.0   11R inf No tgt   10R 0.2/0.2   7 1.2/1.0   4R No tgt   11L No tgt   10L No tgt   4L 0.2/0.3   2L No tgt   2R No tgt         After identifying targets the following samples were obtained:    STATION PASS# LYMPHOCYTES MALIGNANT ATYPICAL GRANULOMA NONDGNSTC   7 1 - - - - blood    2 + - - -     3 - - - - blood            11Rs 1 - - - - blood    2 - - - - \"    3 - - - - \"       © 2018 [employer]. and/or its affiliates. All Rights Reserved. T, N, and M descriptors for the eighth edition of TNM classification for lung cancer    T: Primary tumor   Tx Primary tumor cannot be assessed or tumor proven by presence of malignant cells in sputum or bronchial washings but not visualized by imaging or bronchoscopy   T0 No evidence of primary tumor   Tis Carcinoma in situ   T1 Tumor ? 3 cm in greatest dimension surrounded by lung or visceral pleura without bronchoscopic evidence of invasion more proximal than the lobar bronchus (ie, not in the main bronchus)*   T1a(mi) Minimally invasive adenocarcinoma¶   T1a Tumor ? 1 cm in greatest dimension*   T1b Tumor >1 cm but ?2 cm in greatest dimension*   T1c Tumor >2 cm but ?3 cm in greatest dimension*   T2 Tumor >3 cm but ?5 cm or tumor with any of the following features:?  · Involves main bronchus regardless of distance from the enma but without involvement of the enma  · Invades visceral pleura  · Associated with atelectasis or obstructive pneumonitis that extends to the hilar region, involving part or all of the lung   T2a Tumor >3 cm but ?4 cm in greatest dimension   T2b Tumor >4 cm but ?5 cm in greatest dimension   T3 Tumor >5 cm but ?7 cm in greatest dimension or associated with separate tumor nodule(s) in the same lobe as the primary tumor or directly invades any of the following structures: chest wall (including the parietal pleura and superior sulcus tumors), phrenic nerve, parietal pericardium   T4 Tumor >7 cm in greatest dimension or associated with separate tumor nodule(s) in a different ipsilateral lobe than that of the primary tumor or invades any of the following structures: diaphragm, mediastinum, heart, great vessels, trachea, recurrent laryngeal nerve, esophagus, vertebral body, and enma   N: Regional lymph node involvement   Nx Regional lymph nodes cannot be assessed   N0 No regional lymph node metastasis   N1 Metastasis in ipsilateral peribronchial and/or ipsilateral hilar lymph nodes and intrapulmonary nodes, including involvement by direct extension   N2 Metastasis in ipsilateral mediastinal and/or subcarinal lymph node(s)   N3 Metastasis in contralateral mediastinal, contralateral hilar, ipsilateral or contralateral scalene, or supraclavicular lymph node(s)   M: Distant metastasis   M0 No distant metastasis   M1 Distant metastasis present   M1a Separate tumor nodule(s) in a contralateral lobe; tumor with pleural or pericardial nodule(s) or malignant pleural or pericardial effusion?    M1b Single extrathoracic metastasis§   M1c Multiple extrathoracic metastases in one or more organs   Stage groupings   Occult carcinoma TX N0 M0   Stage 0 Tis N0 M0   Stage IA1 T1a(mi) N0 M0    T1a N0 M0   Stage IA2 T1b N0 M0   Stage IA3 T1c N0 M0   Stage IB T2a N0 M0   Stage IIA T2b N0 M0   Stage IIB T1a to c N1 M0    T2a N1 M0    T2b N1 M0    T3 N0 M0   Stage IIIA T1a to c N2 M0    T2a to b N2 M0    T3 N1 M0    T4 N0 M0    T4 N1 M0   Stage IIIB T1a to c N3 M0    T2a to b N3 M0    T3 N2 M0 T4 N2 M0   Stage IIIC T3 N3 M0    T4 N3 M0   Stage KODY Any T Any N M1a    Any T Any N M1b   Stage IVB Any T Any N M1c   NOTE: Changes to the seventh edition are in bold. TNM: tumor, node, metastasis; Tis: carcinoma in situ; T1a(mi): minimally invasive adenocarcinoma. * The uncommon superficial spreading tumor of any size with its invasive component limited to the bronchial wall, which may extend proximal to the main bronchus, is also classified as T1a. ¶ Solitary adenocarcinoma, ?3 cm with a predominately lepidic pattern and ?5 mm invasion in any one focus. ? T2 tumors with these features are classified as T2a if ?4 cm in greatest dimension or if size cannot be determined, and T2b if >4 cm but ?5 cm in greatest dimension. ? Most pleural (pericardial) effusions with lung cancer are due to tumor. In a few patients, however, multiple microscopic examinations of pleural (pericardial) fluid are negative for tumor and the fluid is nonbloody and not an exudate. When these elements and clinical judgment dictate that the effusion is not related to the tumor, the effusion should be excluded as a staging descriptor. § This includes involvement of a single distant (nonregional) lymph node. Reproduced from: Shahram Wood et al. The IASLC Lung Cancer Staging Project: Proposals for Revision of the TNM Stage Groupings in the Forthcoming (Eighth) Edition of the TNM Classification for Lung Cancer. J Thorac Oncol 2016; 11:39. Table used with the permission of [employer]. All rights reserved. Graphic 169954 Version 1.0                                         Olympus T 180   bronchoscope wasintroduced into the airways to identify and biopsy the RUL nodule with the aid of Super D EMN system and radial probe US. CT image was acquired on with Super D protocol were used and the pathway to target  planned using super D planning software.     Planing data was then used to navigate to the nodule, after verification with radial US:      Media Information                        Document Information     Procedure/Surgery   Bronchoscopy Photos   05/08/2019   Attached To: Hospital Encounter on 5/8/19 with SFE ASSESSMENT RM 04     Source Information     Javier-Wilbert Palomares, RT  [facility]              5 forceps biopsies were obtained and evaluated via touch prep and LINO. Touch preps revealed non small cell cancer. 3 additional dedicated samples were sent in tot for histology and IHC    Histology from obtained tissue is currently pending. TOUCH PREP BIOPSY# MALIGNANT SUSPICIOUS ATYPIA NONDGNSTC   RUL nodule forceps 1 ++++ - -     2 - - - blood            3 -- -- -- In toto for histo and ihc    4 -- -- -- \"    5 -- -- -- \"     EBL: 15 ml    Complications: NONE with no evidence of pneumothorax on post-op flouroscopy.     Diane Baez MD.

## 2019-05-09 NOTE — PROCEDURES
PROCEDURE  Bronchoscopy with ENB and  Endobronchial Ultrasound Guided placement of fiducial markers for stereotactic radiation/cyberknife therapy. INDICATION   Peripheral RUL non small cell lung cancer    EQUIPEMENT:  Olympus T180 bronchoscope  Super Dimension EMN system  180 deg steareble sheath  UM 17 Radial probe  SuperLock Cobra fiducial marker x 4  Fluoroscopy    ANESTHESIA  See Navigation /EBUS procedure note    Summary    After obtaining informed consent, using a bite block, an Olympus T 180 video bronchoscope was  introduced into the trachea through the vocal cords without complication. CT images  with Super D protocol were used and the pathways to target and fiducial marker placement locations were  planned using super D planning software. With the aid of Super D EMN system and radial probe US planing data was then used to navigate to the nodule, after verification with radial US. Fiducial markers were then inserted in the usual fashion using deployment sheath under fluoroscopic guidance, in 3 separate planes with one marker placed within the lesion. After excluding PTX with the aid of fluoroscopy , the procedure was completed.       EBL: none    Coplications: none    Norberto Tracy MD.

## 2019-05-09 NOTE — DISCHARGE INSTRUCTIONS
RESPIRATORY CARE - BRONCHOSCOPY - DISCHARGE INSTRUCTIONS      You received a lot of numbing medication for your throat and nose, and you also received medication to make you sleepy during your procedure. Because of this and because the bronchoscopy may have irritated your airways, we ask that you follow these directions:    1. Do not eat or drink until  11:00 . After that, you may have what you please. You may want to try some liquids first, because your throat may be a little sore. 2. Medication may cause drowsiness for several hours, therefore:  · Do not drive or operate machinery for remainder of the day. · No alcohol today  · Do not make any important or legal decisions for 24 hours  · Do not sign any legal documents for 24 hours    3. You may cough up more mucus than usual and you may see some blood, but this is expected and should subside by the following day. 4. If severe throat irritation, coughing, or bleeding continue, call your doctor. 5.         If you run a fever greater than 102, call [facility] at [phone]. 6.         Dr. Mar Hernandez has asked that you:                A. Call the doctor's office at [phone] for follow up appointment with [facility]. B. We will be referring you to oncology (radiation). Someone will be in contact with you in regard to the appointments and test to follow. Discharge Medications: Resume medications as ordered. Resume Plavix tomorrow.        Instructions given to Jose Pereyra and other family members

## 2019-05-09 NOTE — INTERVAL H&P NOTE
H&P Update:  Jose Pereyra was seen and examined. History and physical has been reviewed. The patient has been examined.  There have been no significant clinical changes since the completion of the originally dated History and Physical.

## 2019-05-23 ENCOUNTER — HOSPITAL ENCOUNTER (OUTPATIENT)
Dept: LAB | Age: 79
Discharge: HOME | End: 2019-05-23
Payer: MEDICARE

## 2019-05-23 ENCOUNTER — HOSPITAL ENCOUNTER (OUTPATIENT)
Dept: CT IMAGING | Age: 79
Discharge: HOME | End: 2019-05-23
Attending: SURGERY
Payer: MEDICARE

## 2019-05-23 DIAGNOSIS — C34.11 MALIGNANT NEOPLASM OF UPPER LOBE OF RIGHT LUNG (HCC): ICD-10-CM

## 2019-05-23 DIAGNOSIS — I71.40 ABDOMINAL AORTIC ANEURYSM (AAA) WITHOUT RUPTURE: ICD-10-CM

## 2019-05-23 LAB
ALBUMIN SERPL-MCNC: 3.9 G/DL (ref 3.2–4.6)
ALBUMIN/GLOB SERPL: 1.1 {RATIO} (ref 1.2–3.5)
ALP SERPL-CCNC: 95 U/L (ref 50–136)
ALT SERPL-CCNC: 13 U/L (ref 12–65)
ANION GAP SERPL CALC-SCNC: 7 MMOL/L (ref 7–16)
AST SERPL-CCNC: 16 U/L (ref 15–37)
BASOPHILS # BLD: 0.1 K/UL (ref 0–0.2)
BASOPHILS NFR BLD: 1 % (ref 0–2)
BILIRUB SERPL-MCNC: 0.7 MG/DL (ref 0.2–1.1)
BUN SERPL-MCNC: 40 MG/DL (ref 8–23)
CALCIUM SERPL-MCNC: 9.3 MG/DL (ref 8.3–10.4)
CHLORIDE SERPL-SCNC: 109 MMOL/L (ref 98–107)
CO2 SERPL-SCNC: 23 MMOL/L (ref 21–32)
CREAT BLD-MCNC: 2.6 MG/DL (ref 0.8–1.5)
CREAT SERPL-MCNC: 2.55 MG/DL (ref 0.8–1.5)
DIFFERENTIAL METHOD BLD: ABNORMAL
EOSINOPHIL # BLD: 1 K/UL (ref 0–0.8)
EOSINOPHIL NFR BLD: 9 % (ref 0.5–7.8)
ERYTHROCYTE [DISTWIDTH] IN BLOOD BY AUTOMATED COUNT: 13.5 % (ref 11.9–14.6)
GLOBULIN SER CALC-MCNC: 3.7 G/DL (ref 2.3–3.5)
GLUCOSE SERPL-MCNC: 95 MG/DL (ref 65–100)
HCT VFR BLD AUTO: 41.2 % (ref 41.1–50.3)
HGB BLD-MCNC: 13.1 G/DL (ref 13.6–17.2)
IMM GRANULOCYTES # BLD AUTO: 0 K/UL (ref 0–0.5)
IMM GRANULOCYTES NFR BLD AUTO: 0 % (ref 0–5)
LYMPHOCYTES # BLD: 2.2 K/UL (ref 0.5–4.6)
LYMPHOCYTES NFR BLD: 20 % (ref 13–44)
MCH RBC QN AUTO: 30.3 PG (ref 26.1–32.9)
MCHC RBC AUTO-ENTMCNC: 31.8 G/DL (ref 31.4–35)
MCV RBC AUTO: 95.2 FL (ref 79.6–97.8)
MONOCYTES # BLD: 1 K/UL (ref 0.1–1.3)
MONOCYTES NFR BLD: 9 % (ref 4–12)
NEUTS SEG # BLD: 6.6 K/UL (ref 1.7–8.2)
NEUTS SEG NFR BLD: 61 % (ref 43–78)
NRBC # BLD: 0 K/UL (ref 0–0.2)
PLATELET # BLD AUTO: 255 K/UL (ref 150–450)
PMV BLD AUTO: 10.5 FL (ref 9.4–12.3)
POTASSIUM SERPL-SCNC: 4.7 MMOL/L (ref 3.5–5.1)
PROT SERPL-MCNC: 7.6 G/DL (ref 6.3–8.2)
RBC # BLD AUTO: 4.33 M/UL (ref 4.23–5.67)
SODIUM SERPL-SCNC: 139 MMOL/L (ref 136–145)
WBC # BLD AUTO: 10.8 K/UL (ref 4.3–11.1)

## 2019-05-23 PROCEDURE — 36415 COLL VENOUS BLD VENIPUNCTURE: CPT

## 2019-05-23 PROCEDURE — 85025 COMPLETE CBC W/AUTO DIFF WBC: CPT

## 2019-05-23 PROCEDURE — 82565 ASSAY OF CREATININE: CPT

## 2019-05-23 PROCEDURE — 80053 COMPREHEN METABOLIC PANEL: CPT

## 2019-05-23 PROCEDURE — 74176 CT ABD & PELVIS W/O CONTRAST: CPT

## 2019-05-30 DIAGNOSIS — I71.40 AAA (ABDOMINAL AORTIC ANEURYSM) WITHOUT RUPTURE: Primary | Chronic | ICD-10-CM

## 2019-06-12 ENCOUNTER — ANESTHESIA EVENT (OUTPATIENT)
Dept: SURGERY | Age: 79
DRG: 269 | End: 2019-06-12
Payer: MEDICARE

## 2019-06-12 ENCOUNTER — HOSPITAL ENCOUNTER (OUTPATIENT)
Dept: SURGERY | Age: 79
Discharge: HOME | End: 2019-06-12
Payer: MEDICARE

## 2019-06-12 VITALS
HEART RATE: 16 BPM | SYSTOLIC BLOOD PRESSURE: 145 MMHG | RESPIRATION RATE: 18 BRPM | TEMPERATURE: 98.3 F | DIASTOLIC BLOOD PRESSURE: 77 MMHG | WEIGHT: 178.06 LBS | BODY MASS INDEX: 26.37 KG/M2 | HEIGHT: 69 IN

## 2019-06-12 LAB — CREAT SERPL-MCNC: 2.49 MG/DL (ref 0.8–1.5)

## 2019-06-12 PROCEDURE — 82565 ASSAY OF CREATININE: CPT

## 2019-06-12 NOTE — ANESTHESIA PREPROCEDURE EVALUATION
Relevant Problems   No relevant active problems       Anesthetic History   No history of anesthetic complications            Review of Systems / Medical History  Patient summary reviewed and pertinent labs reviewed    Pulmonary    COPD (Moderate to severe - O2 2L qhs and with activity)               Neuro/Psych   Within defined limits           Cardiovascular    Hypertension: well controlled  Valvular problems/murmurs (s/p TAVR)    CHF  Dysrhythmias   Pacemaker (Pacemaker placed POD 1 TAVR due to complete heart block), CAD, CABG (2005) and hyperlipidemia    Exercise tolerance: <4 METS  Comments: S/p TAVR 2/19 - postop echo shows normal EF and normal AV prosthesis and mild MR    Cath prior to TAVR showed patent grafts   GI/Hepatic/Renal     GERD: well controlled    Renal disease (baseline Cr 2.0): CRI       Endo/Other        Arthritis     Other Findings              Physical Exam    Airway  Mallampati: I  TM Distance: 4 - 6 cm  Neck ROM: normal range of motion   Mouth opening: Normal     Cardiovascular  Regular rate and rhythm,  S1 and S2 normal,  no murmur, click, rub, or gallop  Rhythm: regular  Rate: normal         Dental    Dentition: Full lower dentures     Pulmonary  Breath sounds clear to auscultation               Abdominal  Abdominal exam normal       Other Findings            Anesthetic Plan    ASA: 4  Anesthesia type: general    Monitoring Plan: Arterial line      Induction: Intravenous  Anesthetic plan and risks discussed with: Patient and Spouse      AF 6/12: Moderate but acceptable risk per Cardiology due to pulmonary dz. Ok to hold Plavix 5d but will remain on bASA.

## 2019-06-12 NOTE — PERIOP NOTES
Patient confirms name and . Order to obtain consent  found in EHR and  matches case posting. Type 3 surgery,  assessment complete. Labs per surgeon: creatinine with GFR, type and screen signed and held dos. CBC, BMP drawn on 19  Labs per anesthesia protocol: none  EK19, echo 2019, pacer interrogation 19, cardiology note 19 all viewed and approved by Dr. Nery Chávez while assessing patient. Medication list  Rx bottles visualized today. Hibiclens and instructions given per hospital policy. Patient provided with and instructed on educational handouts including Guide to Surgery, Pain Management, Hand Hygiene, Blood Transfusion Education, and [facility] Brochure. Patient answered medical/surgical history questions at their best of ability. All prior to admission medications documented in [facility]. Patient instructed to continue previous medications as prescribed prior to surgery and to take the following medications the day of surgery according to anesthesia guidelines with a small sip of water: tylenol or hydrocodone if needed, asa 81 mg, prednisone, tamsulosin. Patient instructed to hold all vitamins 7 days prior to surgery and NSAIDS 5 days prior to surgery, patient verbalized understanding. Medications to be held: plavix per MD instructions - hold note 19. Patient instructed on the following:  Arrive at [facility], time of arrival to be called the day before by 1700  NPO after midnight including gum, mints, and ice chips. Responsible adult must drive patient to the hospital, stay during surgery, and patient will require supervision 24 hours after anesthesia. Use hibiclens in shower the night before surgery and on the morning of surgery. Leave all valuables (money and jewelry) at home but bring insurance card and ID on       DOS. Do not wear make-up, nail polish, lotions, cologne, perfumes, powders, or oil on skin.     Patient teach back successful and patient demonstrates knowledge of instruction.

## 2019-06-18 ENCOUNTER — HOSPITAL ENCOUNTER (INPATIENT)
Age: 79
LOS: 2 days | Discharge: HOME | DRG: 269 | End: 2019-06-20
Attending: SURGERY | Admitting: SURGERY
Payer: MEDICARE

## 2019-06-18 PROBLEM — I71.40 ABDOMINAL AORTIC ANEURYSM (AAA) WITHOUT RUPTURE: Status: ACTIVE | Noted: 2019-06-18

## 2019-06-18 PROCEDURE — 94760 N-INVAS EAR/PLS OXIMETRY 1: CPT

## 2019-06-18 PROCEDURE — 74011250637 HC RX REV CODE- 250/637: Performed by: SURGERY

## 2019-06-18 PROCEDURE — 77030020263 HC SOL INJ SOD CL0.9% LFCR 1000ML

## 2019-06-18 PROCEDURE — 74011250636 HC RX REV CODE- 250/636: Performed by: PHYSICIAN ASSISTANT

## 2019-06-18 PROCEDURE — 77030032490 HC SLV COMPR SCD KNE COVD -B

## 2019-06-18 PROCEDURE — 65270000029 HC RM PRIVATE

## 2019-06-18 PROCEDURE — 74011000302 HC RX REV CODE- 302: Performed by: PHYSICIAN ASSISTANT

## 2019-06-18 PROCEDURE — 94640 AIRWAY INHALATION TREATMENT: CPT

## 2019-06-18 PROCEDURE — 74011000250 HC RX REV CODE- 250: Performed by: PHYSICIAN ASSISTANT

## 2019-06-18 PROCEDURE — 86580 TB INTRADERMAL TEST: CPT | Performed by: PHYSICIAN ASSISTANT

## 2019-06-18 RX ORDER — SODIUM CHLORIDE 0.9 % (FLUSH) 0.9 %
5-40 SYRINGE (ML) INJECTION AS NEEDED
Status: DISCONTINUED | OUTPATIENT
Start: 2019-06-18 | End: 2019-06-20 | Stop reason: HOSPADM

## 2019-06-18 RX ORDER — AMOXICILLIN 250 MG
1 CAPSULE ORAL DAILY
Status: DISCONTINUED | OUTPATIENT
Start: 2019-06-19 | End: 2019-06-20 | Stop reason: HOSPADM

## 2019-06-18 RX ORDER — MORPHINE SULFATE 2 MG/ML
1 INJECTION, SOLUTION INTRAMUSCULAR; INTRAVENOUS
Status: DISCONTINUED | OUTPATIENT
Start: 2019-06-18 | End: 2019-06-19 | Stop reason: SDUPTHER

## 2019-06-18 RX ORDER — HYDROCODONE BITARTRATE AND ACETAMINOPHEN 5; 325 MG/1; MG/1
1 TABLET ORAL
Status: DISCONTINUED | OUTPATIENT
Start: 2019-06-18 | End: 2019-06-18 | Stop reason: SDUPTHER

## 2019-06-18 RX ORDER — CEFAZOLIN SODIUM/WATER 2 G/20 ML
2 SYRINGE (ML) INTRAVENOUS
Status: COMPLETED | OUTPATIENT
Start: 2019-06-19 | End: 2019-06-19

## 2019-06-18 RX ORDER — ALBUTEROL SULFATE 0.83 MG/ML
2.5 SOLUTION RESPIRATORY (INHALATION)
Status: DISCONTINUED | OUTPATIENT
Start: 2019-06-18 | End: 2019-06-18

## 2019-06-18 RX ORDER — ALBUTEROL SULFATE 0.83 MG/ML
2.5 SOLUTION RESPIRATORY (INHALATION)
Status: DISCONTINUED | OUTPATIENT
Start: 2019-06-19 | End: 2019-06-20 | Stop reason: HOSPADM

## 2019-06-18 RX ORDER — ONDANSETRON 4 MG/1
4 TABLET, ORALLY DISINTEGRATING ORAL
Status: DISCONTINUED | OUTPATIENT
Start: 2019-06-18 | End: 2019-06-20 | Stop reason: HOSPADM

## 2019-06-18 RX ORDER — ACETAMINOPHEN 325 MG/1
650 TABLET ORAL
Status: DISCONTINUED | OUTPATIENT
Start: 2019-06-18 | End: 2019-06-20 | Stop reason: HOSPADM

## 2019-06-18 RX ORDER — SODIUM CHLORIDE 0.9 % (FLUSH) 0.9 %
5-40 SYRINGE (ML) INJECTION EVERY 8 HOURS
Status: DISCONTINUED | OUTPATIENT
Start: 2019-06-18 | End: 2019-06-20 | Stop reason: HOSPADM

## 2019-06-18 RX ORDER — TAMSULOSIN HYDROCHLORIDE 0.4 MG/1
0.4 CAPSULE ORAL DAILY
Status: DISCONTINUED | OUTPATIENT
Start: 2019-06-19 | End: 2019-06-19 | Stop reason: SDUPTHER

## 2019-06-18 RX ORDER — HYDROCODONE BITARTRATE AND ACETAMINOPHEN 5; 325 MG/1; MG/1
1 TABLET ORAL
Status: DISCONTINUED | OUTPATIENT
Start: 2019-06-18 | End: 2019-06-20 | Stop reason: HOSPADM

## 2019-06-18 RX ORDER — VANCOMYCIN HYDROCHLORIDE
1250
Status: COMPLETED | OUTPATIENT
Start: 2019-06-19 | End: 2019-06-19

## 2019-06-18 RX ORDER — SODIUM CHLORIDE 9 MG/ML
80 INJECTION, SOLUTION INTRAVENOUS CONTINUOUS
Status: DISCONTINUED | OUTPATIENT
Start: 2019-06-18 | End: 2019-06-19

## 2019-06-18 RX ORDER — NALOXONE HYDROCHLORIDE 0.4 MG/ML
0.4 INJECTION, SOLUTION INTRAMUSCULAR; INTRAVENOUS; SUBCUTANEOUS AS NEEDED
Status: DISCONTINUED | OUTPATIENT
Start: 2019-06-18 | End: 2019-06-20 | Stop reason: HOSPADM

## 2019-06-18 RX ADMIN — TUBERCULIN PURIFIED PROTEIN DERIVATIVE 5 UNITS: 5 INJECTION, SOLUTION INTRADERMAL at 18:14

## 2019-06-18 RX ADMIN — SODIUM CHLORIDE 80 ML/HR: 900 INJECTION, SOLUTION INTRAVENOUS at 18:14

## 2019-06-18 RX ADMIN — HYDROCODONE BITARTRATE AND ACETAMINOPHEN 1 TABLET: 5; 325 TABLET ORAL at 22:18

## 2019-06-18 RX ADMIN — ALBUTEROL SULFATE 2.5 MG: 2.5 SOLUTION RESPIRATORY (INHALATION) at 19:35

## 2019-06-18 RX ADMIN — Medication 10 ML: at 21:12

## 2019-06-18 RX ADMIN — Medication 10 ML: at 18:18

## 2019-06-18 NOTE — PROGRESS NOTES
06/18/19 1806   Dual Skin Pressure Injury Assessment   Dual Skin Pressure Injury Assessment WDL   Second Care Provider (Based on [address] [place] [address]) Blake Millan RN   Skin Integumentary   Skin Integumentary (WDL) X   Skin Integrity Tattoos (comment); Scars (comment)

## 2019-06-19 ENCOUNTER — APPOINTMENT (OUTPATIENT)
Dept: INTERVENTIONAL RADIOLOGY/VASCULAR | Age: 79
DRG: 269 | End: 2019-06-19
Attending: SURGERY
Payer: MEDICARE

## 2019-06-19 ENCOUNTER — ANESTHESIA (OUTPATIENT)
Dept: SURGERY | Age: 79
DRG: 269 | End: 2019-06-19
Payer: MEDICARE

## 2019-06-19 LAB
ABO + RH BLD: NORMAL
ACT BLD: 219 SECS (ref 70–128)
ANION GAP SERPL CALC-SCNC: 9 MMOL/L (ref 7–16)
BLOOD GROUP ANTIBODIES SERPL: NORMAL
BUN SERPL-MCNC: 32 MG/DL (ref 8–23)
CALCIUM SERPL-MCNC: 8.4 MG/DL (ref 8.3–10.4)
CHLORIDE SERPL-SCNC: 113 MMOL/L (ref 98–107)
CO2 SERPL-SCNC: 22 MMOL/L (ref 21–32)
CREAT SERPL-MCNC: 2.22 MG/DL (ref 0.8–1.5)
ERYTHROCYTE [DISTWIDTH] IN BLOOD BY AUTOMATED COUNT: 14.1 % (ref 11.9–14.6)
GLUCOSE SERPL-MCNC: 85 MG/DL (ref 65–100)
HCT VFR BLD AUTO: 37 % (ref 41.1–50.3)
HGB BLD-MCNC: 11.6 G/DL (ref 13.6–17.2)
MCH RBC QN AUTO: 30.4 PG (ref 26.1–32.9)
MCHC RBC AUTO-ENTMCNC: 31.4 G/DL (ref 31.4–35)
MCV RBC AUTO: 96.9 FL (ref 79.6–97.8)
MM INDURATION POC: 0 MM (ref 0–5)
NRBC # BLD: 0 K/UL (ref 0–0.2)
PLATELET # BLD AUTO: 130 K/UL (ref 150–450)
PMV BLD AUTO: 11.4 FL (ref 9.4–12.3)
POTASSIUM SERPL-SCNC: 3.8 MMOL/L (ref 3.5–5.1)
PPD POC: NEGATIVE NEGATIVE
RBC # BLD AUTO: 3.82 M/UL (ref 4.23–5.6)
SODIUM SERPL-SCNC: 144 MMOL/L (ref 136–145)
SPECIMEN EXP DATE BLD: NORMAL
WBC # BLD AUTO: 9.3 K/UL (ref 4.3–11.1)

## 2019-06-19 PROCEDURE — 77030003629 HC NDL PERC VASC COOK -A

## 2019-06-19 PROCEDURE — 74011000250 HC RX REV CODE- 250: Performed by: SURGERY

## 2019-06-19 PROCEDURE — 94640 AIRWAY INHALATION TREATMENT: CPT

## 2019-06-19 PROCEDURE — 77030037088 HC TUBE ENDOTRACH ORAL NSL COVD-A: Performed by: ANESTHESIOLOGY

## 2019-06-19 PROCEDURE — 74011636320 HC RX REV CODE- 636/320: Performed by: SURGERY

## 2019-06-19 PROCEDURE — 77030021532 HC CATH ANGI DX IMPRS MRTM -B: Performed by: SURGERY

## 2019-06-19 PROCEDURE — 76010000207 HC CV SURG 2.5 TO 3 HR INTENSV-TIER 1: Performed by: SURGERY

## 2019-06-19 PROCEDURE — C1768 GRAFT, VASCULAR: HCPCS | Performed by: SURGERY

## 2019-06-19 PROCEDURE — 36415 COLL VENOUS BLD VENIPUNCTURE: CPT

## 2019-06-19 PROCEDURE — 74011250637 HC RX REV CODE- 250/637: Performed by: SURGERY

## 2019-06-19 PROCEDURE — 77030013794 HC KT TRNSDUC BLD EDWD -B: Performed by: ANESTHESIOLOGY

## 2019-06-19 PROCEDURE — 77030032490 HC SLV COMPR SCD KNE COVD -B

## 2019-06-19 PROCEDURE — 77030020782 HC GWN BAIR PAWS FLX 3M -B: Performed by: ANESTHESIOLOGY

## 2019-06-19 PROCEDURE — 77030034888 HC SUT PROL 2 J&J -B: Performed by: SURGERY

## 2019-06-19 PROCEDURE — C1887 CATHETER, GUIDING: HCPCS | Performed by: SURGERY

## 2019-06-19 PROCEDURE — 74011250636 HC RX REV CODE- 250/636

## 2019-06-19 PROCEDURE — 77030018836 HC SOL IRR NACL ICUM -A: Performed by: SURGERY

## 2019-06-19 PROCEDURE — C1769 GUIDE WIRE: HCPCS | Performed by: SURGERY

## 2019-06-19 PROCEDURE — C1769 GUIDE WIRE: HCPCS

## 2019-06-19 PROCEDURE — 77030039425 HC BLD LARYNG TRULITE DISP TELE -A: Performed by: ANESTHESIOLOGY

## 2019-06-19 PROCEDURE — 77030005401 HC CATH RAD ARRO -A: Performed by: ANESTHESIOLOGY

## 2019-06-19 PROCEDURE — 77030020263 HC SOL INJ SOD CL0.9% LFCR 1000ML

## 2019-06-19 PROCEDURE — 77030013292 HC BOWL MX PRSM J&J -A: Performed by: ANESTHESIOLOGY

## 2019-06-19 PROCEDURE — 77030013519 HC DEV INFL BASIX MRTM -B

## 2019-06-19 PROCEDURE — 85027 COMPLETE CBC AUTOMATED: CPT

## 2019-06-19 PROCEDURE — C1725 CATH, TRANSLUMIN NON-LASER: HCPCS | Performed by: SURGERY

## 2019-06-19 PROCEDURE — 76000 FLUOROSCOPY <1 HR PHYS/QHP: CPT

## 2019-06-19 PROCEDURE — C1753 CATH, INTRAVAS ULTRASOUND: HCPCS

## 2019-06-19 PROCEDURE — C1894 INTRO/SHEATH, NON-LASER: HCPCS | Performed by: SURGERY

## 2019-06-19 PROCEDURE — 77030004561 HC CATH ANGI DX COBRA ANGI -B

## 2019-06-19 PROCEDURE — 77030039266 HC ADH SKN EXOFIN S2SG -A: Performed by: SURGERY

## 2019-06-19 PROCEDURE — 77030031139 HC SUT VCRL2 J&J -A: Performed by: SURGERY

## 2019-06-19 PROCEDURE — 74011250637 HC RX REV CODE- 250/637: Performed by: ANESTHESIOLOGY

## 2019-06-19 PROCEDURE — 77030010512 HC APPL CLP LIG J&J -C: Performed by: SURGERY

## 2019-06-19 PROCEDURE — 77030033147 HC GRFT ENDOVSC TRNK IPSLAT WLGO -K5: Performed by: SURGERY

## 2019-06-19 PROCEDURE — 74011250636 HC RX REV CODE- 250/636: Performed by: PHYSICIAN ASSISTANT

## 2019-06-19 PROCEDURE — 65610000006 HC RM INTENSIVE CARE

## 2019-06-19 PROCEDURE — 76060000037 HC ANESTHESIA 3 TO 3.5 HR: Performed by: SURGERY

## 2019-06-19 PROCEDURE — 77030018673: Performed by: SURGERY

## 2019-06-19 PROCEDURE — 77030004561 HC CATH ANGI DX COBRA ANGI -B: Performed by: SURGERY

## 2019-06-19 PROCEDURE — 77030002996 HC SUT SLK J&J -A: Performed by: SURGERY

## 2019-06-19 PROCEDURE — 74011250636 HC RX REV CODE- 250/636: Performed by: ANESTHESIOLOGY

## 2019-06-19 PROCEDURE — 77030003629 HC NDL PERC VASC COOK -A: Performed by: SURGERY

## 2019-06-19 PROCEDURE — 04V03DZ RESTRICTION OF ABDOMINAL AORTA WITH INTRALUMINAL DEVICE, PERCUTANEOUS APPROACH: ICD-10-PCS | Performed by: SURGERY

## 2019-06-19 PROCEDURE — 86900 BLOOD TYPING SEROLOGIC ABO: CPT

## 2019-06-19 PROCEDURE — 77030005518 HC CATH URETH FOL 2W BARD -B: Performed by: SURGERY

## 2019-06-19 PROCEDURE — 94760 N-INVAS EAR/PLS OXIMETRY 1: CPT

## 2019-06-19 PROCEDURE — 85347 COAGULATION TIME ACTIVATED: CPT

## 2019-06-19 PROCEDURE — 74011000258 HC RX REV CODE- 258

## 2019-06-19 PROCEDURE — 74011000250 HC RX REV CODE- 250

## 2019-06-19 PROCEDURE — 80048 BASIC METABOLIC PNL TOTAL CA: CPT

## 2019-06-19 PROCEDURE — 77010033678 HC OXYGEN DAILY

## 2019-06-19 PROCEDURE — 74011250636 HC RX REV CODE- 250/636: Performed by: SURGERY

## 2019-06-19 DEVICE — EXCLUDER AAA ENDO CONTRA LEG 16MMX16MMX9.5CM 12FR
Type: IMPLANTABLE DEVICE | Site: AORTA | Status: FUNCTIONAL
Brand: GORE EXCLUDER AAA ENDOPROSTHESIS

## 2019-06-19 DEVICE — EXCLUDER AAA ENDO TRNK IPSI 31MMX14.5MMX15CM 18FR
Type: IMPLANTABLE DEVICE | Site: AORTA | Status: FUNCTIONAL
Brand: GORE EXCLUDER AAA ENDOPROSTHESIS WITH C3 DELIVERY

## 2019-06-19 RX ORDER — TAMSULOSIN HYDROCHLORIDE 0.4 MG/1
0.4 CAPSULE ORAL DAILY
Status: DISCONTINUED | OUTPATIENT
Start: 2019-06-19 | End: 2019-06-20 | Stop reason: HOSPADM

## 2019-06-19 RX ORDER — NEOSTIGMINE METHYLSULFATE 1 MG/ML
INJECTION INTRAVENOUS AS NEEDED
Status: DISCONTINUED | OUTPATIENT
Start: 2019-06-19 | End: 2019-06-19 | Stop reason: HOSPADM

## 2019-06-19 RX ORDER — PROTAMINE SULFATE 10 MG/ML
INJECTION, SOLUTION INTRAVENOUS AS NEEDED
Status: DISCONTINUED | OUTPATIENT
Start: 2019-06-19 | End: 2019-06-19 | Stop reason: HOSPADM

## 2019-06-19 RX ORDER — ROCURONIUM BROMIDE 10 MG/ML
INJECTION, SOLUTION INTRAVENOUS AS NEEDED
Status: DISCONTINUED | OUTPATIENT
Start: 2019-06-19 | End: 2019-06-19 | Stop reason: HOSPADM

## 2019-06-19 RX ORDER — LIDOCAINE HYDROCHLORIDE 10 MG/ML
0.1 INJECTION INFILTRATION; PERINEURAL AS NEEDED
Status: DISCONTINUED | OUTPATIENT
Start: 2019-06-19 | End: 2019-06-20 | Stop reason: HOSPADM

## 2019-06-19 RX ORDER — SODIUM CHLORIDE 0.9 % (FLUSH) 0.9 %
5-40 SYRINGE (ML) INJECTION AS NEEDED
Status: DISCONTINUED | OUTPATIENT
Start: 2019-06-19 | End: 2019-06-20 | Stop reason: HOSPADM

## 2019-06-19 RX ORDER — DIPHENHYDRAMINE HYDROCHLORIDE 50 MG/ML
12.5 INJECTION, SOLUTION INTRAMUSCULAR; INTRAVENOUS ONCE
Status: ACTIVE | OUTPATIENT
Start: 2019-06-19 | End: 2019-06-20

## 2019-06-19 RX ORDER — GLYCOPYRROLATE 0.2 MG/ML
INJECTION INTRAMUSCULAR; INTRAVENOUS AS NEEDED
Status: DISCONTINUED | OUTPATIENT
Start: 2019-06-19 | End: 2019-06-19 | Stop reason: HOSPADM

## 2019-06-19 RX ORDER — OXYCODONE AND ACETAMINOPHEN 5; 325 MG/1; MG/1
1 TABLET ORAL AS NEEDED
Status: DISCONTINUED | OUTPATIENT
Start: 2019-06-19 | End: 2019-06-20 | Stop reason: ALTCHOICE

## 2019-06-19 RX ORDER — SODIUM CHLORIDE, SODIUM LACTATE, POTASSIUM CHLORIDE, CALCIUM CHLORIDE 600; 310; 30; 20 MG/100ML; MG/100ML; MG/100ML; MG/100ML
100 INJECTION, SOLUTION INTRAVENOUS CONTINUOUS
Status: DISCONTINUED | OUTPATIENT
Start: 2019-06-19 | End: 2019-06-19

## 2019-06-19 RX ORDER — HYDROMORPHONE HYDROCHLORIDE 1 MG/ML
0.5 INJECTION, SOLUTION INTRAMUSCULAR; INTRAVENOUS; SUBCUTANEOUS
Status: DISCONTINUED | OUTPATIENT
Start: 2019-06-19 | End: 2019-06-20 | Stop reason: ALTCHOICE

## 2019-06-19 RX ORDER — SODIUM CHLORIDE 0.9 % (FLUSH) 0.9 %
5-40 SYRINGE (ML) INJECTION EVERY 8 HOURS
Status: DISCONTINUED | OUTPATIENT
Start: 2019-06-19 | End: 2019-06-19

## 2019-06-19 RX ORDER — HEPARIN SODIUM 5000 [USP'U]/ML
INJECTION, SOLUTION INTRAVENOUS; SUBCUTANEOUS AS NEEDED
Status: DISCONTINUED | OUTPATIENT
Start: 2019-06-19 | End: 2019-06-19 | Stop reason: HOSPADM

## 2019-06-19 RX ORDER — PROPOFOL 10 MG/ML
INJECTION, EMULSION INTRAVENOUS AS NEEDED
Status: DISCONTINUED | OUTPATIENT
Start: 2019-06-19 | End: 2019-06-19 | Stop reason: HOSPADM

## 2019-06-19 RX ORDER — DEXAMETHASONE SODIUM PHOSPHATE 4 MG/ML
INJECTION, SOLUTION INTRA-ARTICULAR; INTRALESIONAL; INTRAMUSCULAR; INTRAVENOUS; SOFT TISSUE AS NEEDED
Status: DISCONTINUED | OUTPATIENT
Start: 2019-06-19 | End: 2019-06-19 | Stop reason: HOSPADM

## 2019-06-19 RX ORDER — HEPARIN SODIUM 1000 [USP'U]/ML
INJECTION, SOLUTION INTRAVENOUS; SUBCUTANEOUS AS NEEDED
Status: DISCONTINUED | OUTPATIENT
Start: 2019-06-19 | End: 2019-06-19 | Stop reason: HOSPADM

## 2019-06-19 RX ORDER — SODIUM CHLORIDE 9 MG/ML
50 INJECTION, SOLUTION INTRAVENOUS CONTINUOUS
Status: DISCONTINUED | OUTPATIENT
Start: 2019-06-19 | End: 2019-06-19

## 2019-06-19 RX ORDER — SODIUM CHLORIDE, SODIUM LACTATE, POTASSIUM CHLORIDE, CALCIUM CHLORIDE 600; 310; 30; 20 MG/100ML; MG/100ML; MG/100ML; MG/100ML
INJECTION, SOLUTION INTRAVENOUS
Status: DISCONTINUED | OUTPATIENT
Start: 2019-06-19 | End: 2019-06-19 | Stop reason: HOSPADM

## 2019-06-19 RX ORDER — LIDOCAINE HYDROCHLORIDE 20 MG/ML
INJECTION, SOLUTION EPIDURAL; INFILTRATION; INTRACAUDAL; PERINEURAL AS NEEDED
Status: DISCONTINUED | OUTPATIENT
Start: 2019-06-19 | End: 2019-06-19 | Stop reason: HOSPADM

## 2019-06-19 RX ORDER — FENTANYL CITRATE 50 UG/ML
25 INJECTION, SOLUTION INTRAMUSCULAR; INTRAVENOUS ONCE
Status: ACTIVE | OUTPATIENT
Start: 2019-06-19 | End: 2019-06-19

## 2019-06-19 RX ORDER — FAMOTIDINE 20 MG/1
20 TABLET, FILM COATED ORAL ONCE
Status: COMPLETED | OUTPATIENT
Start: 2019-06-19 | End: 2019-06-19

## 2019-06-19 RX ORDER — SODIUM CHLORIDE 9 MG/ML
75 INJECTION, SOLUTION INTRAVENOUS CONTINUOUS
Status: ACTIVE | OUTPATIENT
Start: 2019-06-19 | End: 2019-06-19

## 2019-06-19 RX ORDER — MIDAZOLAM HYDROCHLORIDE 1 MG/ML
2 INJECTION, SOLUTION INTRAMUSCULAR; INTRAVENOUS ONCE
Status: ACTIVE | OUTPATIENT
Start: 2019-06-19 | End: 2019-06-19

## 2019-06-19 RX ORDER — ONDANSETRON 2 MG/ML
INJECTION INTRAMUSCULAR; INTRAVENOUS AS NEEDED
Status: DISCONTINUED | OUTPATIENT
Start: 2019-06-19 | End: 2019-06-19 | Stop reason: HOSPADM

## 2019-06-19 RX ORDER — FENTANYL CITRATE 50 UG/ML
INJECTION, SOLUTION INTRAMUSCULAR; INTRAVENOUS AS NEEDED
Status: DISCONTINUED | OUTPATIENT
Start: 2019-06-19 | End: 2019-06-19 | Stop reason: HOSPADM

## 2019-06-19 RX ORDER — ESMOLOL HYDROCHLORIDE 10 MG/ML
INJECTION INTRAVENOUS AS NEEDED
Status: DISCONTINUED | OUTPATIENT
Start: 2019-06-19 | End: 2019-06-19 | Stop reason: HOSPADM

## 2019-06-19 RX ORDER — DIPHENHYDRAMINE HYDROCHLORIDE 50 MG/ML
12.5 INJECTION, SOLUTION INTRAMUSCULAR; INTRAVENOUS
Status: DISCONTINUED | OUTPATIENT
Start: 2019-06-19 | End: 2019-06-20 | Stop reason: HOSPADM

## 2019-06-19 RX ORDER — MIDAZOLAM HYDROCHLORIDE 1 MG/ML
2 INJECTION, SOLUTION INTRAMUSCULAR; INTRAVENOUS
Status: ACTIVE | OUTPATIENT
Start: 2019-06-19 | End: 2019-06-20

## 2019-06-19 RX ORDER — OXYCODONE HYDROCHLORIDE 5 MG/1
5 TABLET ORAL
Status: DISCONTINUED | OUTPATIENT
Start: 2019-06-19 | End: 2019-06-20 | Stop reason: ALTCHOICE

## 2019-06-19 RX ORDER — MORPHINE SULFATE 2 MG/ML
1 INJECTION, SOLUTION INTRAMUSCULAR; INTRAVENOUS
Status: DISCONTINUED | OUTPATIENT
Start: 2019-06-19 | End: 2019-06-20 | Stop reason: HOSPADM

## 2019-06-19 RX ORDER — BUPIVACAINE HYDROCHLORIDE 2.5 MG/ML
INJECTION, SOLUTION EPIDURAL; INFILTRATION; INTRACAUDAL AS NEEDED
Status: DISCONTINUED | OUTPATIENT
Start: 2019-06-19 | End: 2019-06-19 | Stop reason: HOSPADM

## 2019-06-19 RX ADMIN — HEPARIN SODIUM 8000 UNITS: 1000 INJECTION, SOLUTION INTRAVENOUS; SUBCUTANEOUS at 11:19

## 2019-06-19 RX ADMIN — PROTAMINE SULFATE 40 MG: 10 INJECTION, SOLUTION INTRAVENOUS at 12:41

## 2019-06-19 RX ADMIN — LIDOCAINE HYDROCHLORIDE 80 MG: 20 INJECTION, SOLUTION EPIDURAL; INFILTRATION; INTRACAUDAL; PERINEURAL at 10:29

## 2019-06-19 RX ADMIN — ALBUTEROL SULFATE 2.5 MG: 2.5 SOLUTION RESPIRATORY (INHALATION) at 07:40

## 2019-06-19 RX ADMIN — FENTANYL CITRATE 25 MCG: 50 INJECTION, SOLUTION INTRAMUSCULAR; INTRAVENOUS at 11:03

## 2019-06-19 RX ADMIN — ALBUTEROL SULFATE 2.5 MG: 2.5 SOLUTION RESPIRATORY (INHALATION) at 20:50

## 2019-06-19 RX ADMIN — NEOSTIGMINE METHYLSULFATE 3 MG: 1 INJECTION INTRAVENOUS at 12:50

## 2019-06-19 RX ADMIN — ONDANSETRON 4 MG: 2 INJECTION INTRAMUSCULAR; INTRAVENOUS at 12:38

## 2019-06-19 RX ADMIN — GLYCOPYRROLATE 0.4 MG: 0.2 INJECTION INTRAMUSCULAR; INTRAVENOUS at 12:50

## 2019-06-19 RX ADMIN — ALBUTEROL SULFATE 2.5 MG: 2.5 SOLUTION RESPIRATORY (INHALATION) at 15:55

## 2019-06-19 RX ADMIN — FENTANYL CITRATE 25 MCG: 50 INJECTION, SOLUTION INTRAMUSCULAR; INTRAVENOUS at 12:37

## 2019-06-19 RX ADMIN — Medication 10 ML: at 21:51

## 2019-06-19 RX ADMIN — PROPOFOL 150 MG: 10 INJECTION, EMULSION INTRAVENOUS at 10:29

## 2019-06-19 RX ADMIN — ROCURONIUM BROMIDE 50 MG: 10 INJECTION, SOLUTION INTRAVENOUS at 10:29

## 2019-06-19 RX ADMIN — SODIUM CHLORIDE, SODIUM LACTATE, POTASSIUM CHLORIDE, AND CALCIUM CHLORIDE 100 ML/HR: 600; 310; 30; 20 INJECTION, SOLUTION INTRAVENOUS at 09:17

## 2019-06-19 RX ADMIN — HYDROCODONE BITARTRATE AND ACETAMINOPHEN 1 TABLET: 5; 325 TABLET ORAL at 21:50

## 2019-06-19 RX ADMIN — FENTANYL CITRATE 25 MCG: 50 INJECTION, SOLUTION INTRAMUSCULAR; INTRAVENOUS at 11:06

## 2019-06-19 RX ADMIN — HEPARIN SODIUM 4000 UNITS: 1000 INJECTION, SOLUTION INTRAVENOUS; SUBCUTANEOUS at 11:32

## 2019-06-19 RX ADMIN — Medication 10 ML: at 15:51

## 2019-06-19 RX ADMIN — TAMSULOSIN HYDROCHLORIDE 0.4 MG: 0.4 CAPSULE ORAL at 06:31

## 2019-06-19 RX ADMIN — HYDROCODONE BITARTRATE AND ACETAMINOPHEN 1 TABLET: 5; 325 TABLET ORAL at 14:34

## 2019-06-19 RX ADMIN — SODIUM CHLORIDE 75 ML/HR: 900 INJECTION, SOLUTION INTRAVENOUS at 14:25

## 2019-06-19 RX ADMIN — FAMOTIDINE 20 MG: 20 TABLET ORAL at 05:56

## 2019-06-19 RX ADMIN — Medication 10 ML: at 05:04

## 2019-06-19 RX ADMIN — FENTANYL CITRATE 50 MCG: 50 INJECTION, SOLUTION INTRAMUSCULAR; INTRAVENOUS at 10:29

## 2019-06-19 RX ADMIN — SODIUM CHLORIDE, SODIUM LACTATE, POTASSIUM CHLORIDE, CALCIUM CHLORIDE: 600; 310; 30; 20 INJECTION, SOLUTION INTRAVENOUS at 10:40

## 2019-06-19 RX ADMIN — Medication 2 G: at 10:45

## 2019-06-19 RX ADMIN — ESMOLOL HYDROCHLORIDE 30 MG: 10 INJECTION INTRAVENOUS at 13:01

## 2019-06-19 RX ADMIN — DEXAMETHASONE SODIUM PHOSPHATE 4 MG: 4 INJECTION, SOLUTION INTRA-ARTICULAR; INTRALESIONAL; INTRAMUSCULAR; INTRAVENOUS; SOFT TISSUE at 10:35

## 2019-06-19 RX ADMIN — VANCOMYCIN HYDROCHLORIDE 1250 MG: 10 INJECTION, POWDER, LYOPHILIZED, FOR SOLUTION INTRAVENOUS at 09:06

## 2019-06-19 NOTE — PROGRESS NOTES
Problem: Falls - Risk of  Goal: *Absence of Falls  Description  Document Jrndaravind Barros Fall Risk and appropriate interventions in the flowsheet.   Outcome: Progressing Towards Goal     Problem: Patient Education: Go to Patient Education Activity  Goal: Patient/Family Education  Outcome: Progressing Towards Goal

## 2019-06-19 NOTE — PROGRESS NOTES
The documentation for this period is being entered following the guidelines as defined in the [facility] downtime policy by Clementina Pinto.       ([id number])

## 2019-06-19 NOTE — PROGRESS NOTES
TRANSFER - IN REPORT:    Verbal report received from [facility]) on Babak Urban  being received from OR(unit) for routine post - op      Report consisted of patients Situation, Background, Assessment and   Recommendations(SBAR). Information from the following report(s) OR Summary, Procedure Summary and Intake/Output was reviewed with the receiving nurse. Opportunity for questions and clarification was provided. Assessment completed upon patients arrival to unit and care assumed.      Primary RN is Ashley Daniel

## 2019-06-19 NOTE — ANESTHESIA PROCEDURE NOTES
Arterial Line Placement    Start time: 6/19/2019 10:32 AM  End time: 6/19/2019 10:35 AM  Performed by: Adele Guillen MD  Authorized by: Adele Guillen MD     Pre-Procedure  Indications:  Arterial pressure monitoring and blood sampling  Preanesthetic Checklist: patient identified, risks and benefits discussed, anesthesia consent, site marked, patient being monitored, timeout performed and patient being monitored    Timeout Time: 10:32        Procedure:   Prep:  Chlorhexidine  Orientation:  Right  Location:  Radial artery  Catheter size:  20 G  Number of attempts:  1  Cont Cardiac Output Sensor: No      Assessment:   Post-procedure:  Line secured and sterile dressing applied  Patient Tolerance:  Patient tolerated the procedure well with no immediate complications

## 2019-06-19 NOTE — ANESTHESIA POSTPROCEDURE EVALUATION
Procedure(s):  AORTIC ABDOMINAL ANEURYSM REPAIR ENDOVASCULAR (EVAR) . general    Anesthesia Post Evaluation      Multimodal analgesia: multimodal analgesia used between 6 hours prior to anesthesia start to PACU discharge  Patient location during evaluation: PACU  Patient participation: complete - patient participated  Level of consciousness: awake and alert  Pain management: adequate  Airway patency: patent  Anesthetic complications: no  Cardiovascular status: acceptable  Respiratory status: acceptable  Hydration status: acceptable  Post anesthesia nausea and vomiting:  none      Vitals Value Taken Time   BP     Temp     Pulse 79 6/19/2019  2:25 PM   Resp 13 6/19/2019  2:25 PM   SpO2 97 % 6/19/2019  2:25 PM   Vitals shown include unvalidated device data.

## 2019-06-19 NOTE — ROUTINE PROCESS
TRANSFER - OUT REPORT: 
 
Verbal report given to Mark Tony(name) on Olivia Solis  being transferred to [facility](unit) for routine progression of care Report consisted of patients Situation, Background, Assessment and  
Recommendations(SBAR). Information from the following report(s) SBAR and OR Summary was reviewed with the receiving nurse. Lines:  
Peripheral IV 06/19/19 Posterior;Right Hand (Active) Site Assessment Clean, dry, & intact 6/19/2019  9:02 AM  
Phlebitis Assessment 0 6/19/2019  9:02 AM  
Infiltration Assessment 0 6/19/2019  9:02 AM  
Dressing Status Clean, dry, & intact 6/19/2019  9:02 AM  
Dressing Type Transparent;Tape 6/19/2019  9:02 AM  
Hub Color/Line Status Infusing;Green 6/19/2019  9:02 AM  
   
Peripheral IV 06/19/19 Left Forearm (Active) Arterial Line 06/19/19 Right Radial artery (Active) Opportunity for questions and clarification was provided. Patient transported with: 
 Monitor O2 @ 4 liters Registered Nurse CRNA

## 2019-06-19 NOTE — PERIOP NOTES
TRANSFER - IN REPORT:    Verbal report received from [place] jacobsen on Scottie Uriostegui  being received from 7th floor, rm [phone] for ordered procedure      Report consisted of patients Situation, Background, Assessment and   Recommendations(SBAR). Information from the following report(s) SBAR, Kardex, [facility], Recent Results and Procedure Verification was reviewed with the receiving nurse. Opportunity for questions and clarification was provided. Assessment completed upon patients arrival to unit and care assumed.

## 2019-06-19 NOTE — H&P
[address] [facility]  [phone]    History and Physical   Jose Pereyra    Admit date: 2019    MRN: [id number]     : 1940     Age: 67 y.o.          2019 8:42 AM    Subjective/HPI:   This patient is a 67 y.o. white male seen prior to surgical repair of infrarenal abdominal aortic aneurysm; he was admitted yesterday evening for pre-operative hydration overnight. He was seen in the office 2019 by vascular surgeon Noble Mckinley MD following an abdominal ultrasound that demonstrated AAA measuring 5.2 x 5.6 cm in maximum AP and transverse diameters. Abdominal CT (2019, non-contrast) with AAA measuring 5.3 x 5.4cm essentially unchanged from IV contrast-enhanced CTA done 2019. Patient reports AAA was identified several years prior at a [employer] screening but that he failed to follow up. Today he has no complaints. He denies interval fever, chills, palpitations, or dyspnea. He denies any new chest or back pain. He admits to long-standing bilateral foot neuropathy but denies pain with ambulation. He denies abdominal pain, changes in bowel function or changes in appetite. Patient's past medical, surgical, family and social histories were reviewed as noted here and below. He is s/p TAVR procedure (2019, Khalida Arvizu MD) followed by pacemaker placement. He also had coronary revascularization about 3 years ago. He reports smoking history of 3ppd x50y but reports he quit 15y ago. Review of Systems  Pertinent items are noted in HPI. Past Medical History:   Diagnosis Date    AAA (abdominal aortic aneurysm) without rupture (HCC)     per CT scan dated 19=There is a infrarenal abdominal aortic aneurysm measuring 5.5 x 5.1 cm the celiac artery demonstrates stenosis at its origin. ; previous followed by=Ruel Price MD ; has a new pt. appt.  with Dr. Oz Livingston on 19    Adverse effect of anesthesia stopped breathing during gallbladder surgery in [facility]; had another surgery right after gallbladder surgery at [facility] and they all most lost him per patient's wife; no complications with recent surgeries in 2018 at [facility] CAD (coronary artery disease)     denies MI; no stents; hx. of CABG    CHF (congestive heart failure) ([address]     hospitalized 04/16/19; on Lasix     Chronic kidney disease     no nephrologist; stage 3- pt denies any kidney failure, only kidney stones; last creatinine=2.0 on 05/02/19     Chronic kidney disease, stage 3 (HCC)     Chronic pain     due to neuropathy    Complete heart block ([address] 02/13/2019    pacemaker     COPD (chronic obstructive pulmonary disease) ([address]     nebulizer prn; rescue inhaler prn; daily inhaler     Diastolic congestive heart failure (HCC)     SANCHEZ (dyspnea on exertion)     Emphysema (subcutaneous) (surgical) resulting from a procedure     Emphysema lung ([address]     Former smoker     GERD (gastroesophageal reflux disease)     tums prn     Heart murmur     \"I was diagnosed at long time ago\"; last echo 03/21/19; s/p TAVR    Hemorrhoid     High cholesterol     no meds    History of kidney stones     multiple-surgical intervention and naturally pass    History of sepsis 10/2018    related to kidney stone    History of skin cancer     Hx of CABG 2005    Hypertension     no BP meds since TAVR    Hypoxia     Long term (current) use of systemic steroids     Prednisone    Lung cancer (HCC)     stage 2; [facility]; Bronchoscopy 05/09/19    Lung nodule     MRSA (methicillin resistant staph aureus) culture positive     positive nasal swab collected on PAT     MSSA (methicillin susceptible Staphylococcus aureus)     positive MSSA nasal swab collected at [facility]    Nephrolithiasis     Neuropathy     feet and hands    On home oxygen therapy     2L/NC at bedtime and during the day prn for sob     Oxygen dependent     2 liters as needed  Pacemaker 02/13/2019    Biotronik     RA (rheumatoid arthritis) (HCC)     managed with Remicade    Renal artery stenosis (HCC)     Severe aortic stenosis     S/P TAVR on 02/12/19    Tattoo     Left FA      Past Surgical History:   Procedure Laterality Date    CABG, ARTERY-VEIN, THREE  2004    HX AORTIC VALVE REPLACEMENT  02/12/2019    TAVR    HX APPENDECTOMY  13years old    HX CATARACT REMOVAL Bilateral     HX CHOLECYSTECTOMY      HX COLONOSCOPY      HX CORONARY ARTERY BYPASS GRAFT  2005?    3 vessel    HX HEART CATHETERIZATION  05/06/2014, 01/18/19    no stents     HX HEENT Right 04/2018    eye surgery for glaucoma     HX PACEMAKER  02/2019    HX PACEMAKER PLACEMENT Left 02/13/2019    Biotronik     HX UROLOGICAL  10/24/2018    CYSTOURETHROSCOPY,W/URETEROSCOPY/PYELOSCOPY; W/LITHOTRIPSY INCLUDING INSERTION INDWELLING URETERAL STENT** CYSTOSCOPY, BILATERAL URETEROSCOPY, LASER LITHOTRIPSY, BILATERAL RETROGRADE PYELOGRAM, WITH BILATERAL URETERAL STENT EXCHANGE**    HX UROLOGICAL  10/03/2018    CYSTOURETHROSCOPY, W/INSERTION INDWELLING URETERAL STENT (C-ARM)    HX UROLOGICAL  09/19/2018    RI CYSTO/URETERO W/LITHOTRIPSY &INDWELL STENT INSRT        Allergies   Allergen Reactions    Methotrexate Other (comments)     \"hemorrhage\" per wife      Social History     Tobacco Use    Smoking status: Former Smoker     Packs/day: 3.00     Years: 50.00     Pack years: 150.00     Types: Cigarettes     Last attempt to quit: 1/3/2004     Years since quitting: 15.4    Smokeless tobacco: Never Used   Substance Use Topics    Alcohol use: No     Frequency: Never      Family History   Problem Relation Age of Onset    Kidney Disease Mother     Diabetes Mother     Heart Attack Father     Liver Disease Sister     Heart Disease Brother     Heart Attack Brother     Heart Surgery Brother     Heart Failure Brother       Prior to Admission Medications   Prescriptions Last Dose Informant Patient Reported? Taking? HYDROcodone-acetaminophen (NORCO) 7.5-325 mg per tablet 2019 at Unknown time  Yes Yes   Sig: Take 1 Tab by mouth nightly. Indications: Pain   acetaminophen (TYLENOL) 325 mg cap Not Taking at Unknown time  Yes No   Sig: Take 1,000 mg by mouth every six (6) hours as needed for Pain. Take / use AM day of surgery  per anesthesia protocols if needed. Indications: Pain   albuterol (PROAIR HFA) 90 mcg/actuation inhaler 2019 at Unknown time  Yes Yes   Sig: Take 2 Puffs by inhalation every four (4) hours as needed for Wheezing or Shortness of Breath. Take / use AM day of surgery  per anesthesia protocol if needed. albuterol (PROVENTIL VENTOLIN) 2.5 mg /3 mL (0.083 %) nebulizer solution Unknown at Unknown time  Yes No   Si.5 mg by Nebulization route three (3) times daily as needed for Wheezing or Shortness of Breath. Take / use AM day of surgery  per anesthesia protocols if needed. aspirin delayed-release 81 mg tablet 2019 at Unknown time  Yes Yes   Sig: Take 81 mg by mouth daily. Take / use AM day of surgery  per anesthesia protocols. brimonidine (ALPHAGAN P) 0.1 % ophthalmic solution 2019 at Unknown time  Yes Yes   Sig: Administer 1 Drop to left eye nightly. cholecalciferol (VITAMIN D3) 1,000 unit tablet 2019 at Unknown time  Yes Yes   Sig: Take 1,000 Units by mouth daily. clopidogrel (PLAVIX) 75 mg tab 2019  No No   Sig: Take 1 Tab by mouth daily. furosemide (LASIX) 40 mg tablet 2019 at Unknown time  No Yes   Sig: Take 1.5 Tabs by mouth daily. Patient taking differently: Take 40 mg by mouth daily. infliximab (REMICADE IV) 2019  Yes No   Sig: by IntraVENous route. Every 7 weeks; last infusion 2019   potassium chloride (K-DUR, KLOR-CON) 20 mEq tablet 2019 at Unknown time  No Yes   Sig: Take 2 Tabs by mouth daily. Patient taking differently: Take 20 mEq by mouth daily.    predniSONE (DELTASONE) 5 mg tablet 2019 at Unknown time  Yes Yes   Sig: Take 5 mg by mouth daily. Take / use AM day of surgery  per anesthesia protocols. tamsulosin (FLOMAX) 0.4 mg capsule 6/18/2019 at Unknown time  Yes Yes   Sig: Take 0.4 mg by mouth daily. Take / use AM day of surgery  per anesthesia protocols. travoprost (TRAVATAN Z) 0.004 % ophthalmic solution 6/17/2019 at Unknown time  Yes Yes   Sig: Administer 1 Drop to left eye nightly.       Facility-Administered Medications: None     Current Facility-Administered Medications   Medication Dose Route Frequency    tamsulosin (FLOMAX) capsule 0.4 mg  0.4 mg Oral DAILY    sodium chloride (NS) flush 5-40 mL  5-40 mL IntraVENous Q8H    sodium chloride (NS) flush 5-40 mL  5-40 mL IntraVENous PRN    tuberculin injection 5 Units  5 Units IntraDERMal ONCE    acetaminophen (TYLENOL) tablet 650 mg  650 mg Oral Q4H PRN    morphine injection 1 mg  1 mg IntraVENous Q4H PRN    naloxone (NARCAN) injection 0.4 mg  0.4 mg IntraVENous PRN    ondansetron (ZOFRAN ODT) tablet 4 mg  4 mg Oral Q4H PRN    senna-docusate (PERICOLACE) 8.6-50 mg per tablet 1 Tab  1 Tab Oral DAILY    0.9% sodium chloride infusion  80 mL/hr IntraVENous CONTINUOUS    ceFAZolin (ANCEF) 2 g/20 mL in sterile water IV syringe  2 g IntraVENous ON CALL TO OR    vancomycin (VANCOCIN) 1250 mg in  ml infusion  1,250 mg IntraVENous ON CALL TO OR    HYDROcodone-acetaminophen (NORCO) 5-325 mg per tablet 1 Tab  1 Tab Oral Q4H PRN    albuterol (PROVENTIL VENTOLIN) nebulizer solution 2.5 mg  2.5 mg Nebulization Q6HWA RT     Objective:     Vitals:    06/18/19 2329 06/19/19 0334 06/19/19 0741 06/19/19 0801   BP: 109/57 125/66  147/72   Pulse: 73 70  76   Resp: 18 18 18   Temp: 98.3 °F (36.8 °C) 98 °F (36.7 °C)  97.9 °F (36.6 °C)   SpO2: 96% 97% 98% 96%   Weight:         Physical Exam:   General well-developed, in no acute distress  Skin  warm, moist with texture of chronic sun exposure, no jaundice or rashes  HEENT normocephalic, no lesions to the scalp; extraocular muscles intact, nares patent, tongue midline, mouth with dentures, oral mucosa moist  Neck  supple without adenopathy, JVD or bruits; trachea midline  Lungs  respirations unlabored, lungs clear to auscultation bilaterally  Heart  regular rate and rhythm, soft systolic murmur  Abdomen soft, non-tender, protuberant but non-distended, bowel sounds normoactive  Extremities warm without cyanosis, normal ROM; groins without rash  Pulses  2+ palpable and symmetric at radial, brachial, DP; right PT 2+, left PT 1+  Neurological alert and oriented; no gross sensorimotor deficits     Data Review   Recent Labs     06/19/19  0405   WBC 9.3   HGB 11.6*   HCT 37.0*   *     Recent Labs     06/19/19  0405      K 3.8   *   CO2 22   GLU 85   BUN 32*   CREA 2.22*       Assessment / Plan / Recommendations / Medical Decision Making:     Hospital Problems  Date Reviewed: 6/18/2019          Codes Class Noted POA    * (Principal) Abdominal aortic aneurysm (AAA) without rupture ([facility] ICD-10-CM: I71.4  ICD-9-CM: 441.4  6/18/2019 Yes              Patient Active Problem List    Diagnosis Date Noted    Abdominal aortic aneurysm (AAA) without rupture ([facility] 06/18/2019    Malignant neoplasm of upper lobe of right lung ([address] 05/09/2019    Chronic diastolic congestive heart failure ([address] 04/23/2019    Stage 3 chronic kidney disease ([address] 04/17/2019    Atherosclerotic AYAZ (renal artery stenosis), bilateral ([address] 04/17/2019    SOB (shortness of breath) 04/16/2019    S/P CABG (coronary artery bypass graft) 04/16/2019    Hypoxia 04/16/2019    Volume overload 04/16/2019    Lung nodule 03/21/2019    S/P placement of cardiac pacemaker 02/13/2019    S/P TAVR (transcatheter aortic valve replacement) 02/12/2019    AAA (abdominal aortic aneurysm) without rupture ([address] 02/12/2019    RA (rheumatoid arthritis) ([facility] 02/12/2019    High cholesterol 02/12/2019    Hypertension 02/07/2019    Pre-op exam 01/04/2019    Coronary artery disease involving native coronary artery of native heart without angina pectoris 01/04/2019    COPD (chronic obstructive pulmonary disease) ([facility] 01/04/2019         Silvia Bowens is a 67 y.o. male with >5cm infrarenal abdominal aortic aneurysm amenable to endovascular repair. He was admitted yesterday for overnight hydration based on GFR in high 20s, creatinine in mid-2s. To OR today for endovascular abdominal aortic aneurysm repair (EVAR) by Taz Alvarado MD.  - remains NPO  - consent  - IV ABX on-call to [facility], [place]-C  Physician Assistant with [facility] Vascular Surgery  [facility].  Madeline Reyes MD / Janes Joel MD

## 2019-06-19 NOTE — BRIEF OP NOTE
BRIEF OPERATIVE NOTE    Date of Procedure: 6/19/2019   Preoperative Diagnosis: AAA (abdominal aortic aneurysm) without rupture (HCC) [I71.4]  Postoperative Diagnosis: AAA (abdominal aortic aneurysm) without rupture ([address] [I71.4]    Procedure(s):  AORTIC ABDOMINAL ANEURYSM REPAIR ENDOVASCULAR (EVAR), IVUS, Claudy Fem cutdown  Surgeon(s) and Role:     * Kyle Cruz MD - Primary     * Wilmar Soto MD - Assisting         Surgical Assistant:     Surgical Staff:  Circ-1: Marty Arias RN  Circ-Relief: Marc Song RN; Misti Salgado, RN  Radiology Technician: Dylan Tolbert; Emir Yu, RT, R, CT  Scrub Tech-1: Victor Hugo Campos  Scrub Tech-2: Ran CABRERA  Event Time In Time Out   Incision Start 06/19/2019 1046    Incision Close 06/19/2019 1259      Anesthesia: General   Estimated Blood Loss: 150cc  Specimens: * No specimens in log *   Findings:    Complications: None  Implants:   Implant Name Type Inv. Item Serial No.  Lot No. LRB No. Used Action   GRAFT ENDV C3 [id number]. [id number] -- AAA EXCLUDER - [id number]  GRAFT ENDV C3 [id number]. [id number] -- AAA EXCLUDER [id number] [employer]  N/A 1 Implanted   GRAFT ENDV EXCLUDER [id number] --  - [id number]  GRAFT ENDV EXCLUDER [id number] --  [id number] [employer]  Right 1 Implanted   GRAFT ENDV EXCLUDER 16MMX9. 5CM --  - [id number]  GRAFT ENDV EXCLUDER 16MMX9. 5CM --  [id number] [employer]  Left 1 Implanted

## 2019-06-19 NOTE — PROGRESS NOTES
TRANSFER - OUT REPORT:    Verbal report given to Donaldosandee Rosey on Geralynn Givens  being transferred to Preop for ordered procedure       Report consisted of patients Situation, Background, Assessment and   Recommendations(SBAR). Information from the following report(s) SBAR and MAR was reviewed with the receiving nurse. Lines:   Peripheral IV 06/18/19 Right Forearm (Active)   Site Assessment Clean, dry, & intact 6/18/2019  7:44 PM   Phlebitis Assessment 0 6/18/2019  7:44 PM   Infiltration Assessment 0 6/18/2019  7:44 PM   Dressing Status Clean, dry, & intact 6/18/2019  7:44 PM   Dressing Type Transparent;Tape 6/18/2019  7:44 PM   Hub Color/Line Status Patent 6/18/2019  7:44 PM        Opportunity for questions and clarification was provided.

## 2019-06-20 VITALS
RESPIRATION RATE: 26 BRPM | SYSTOLIC BLOOD PRESSURE: 144 MMHG | WEIGHT: 184.08 LBS | OXYGEN SATURATION: 98 % | TEMPERATURE: 97.8 F | BODY MASS INDEX: 27.18 KG/M2 | HEART RATE: 80 BPM | DIASTOLIC BLOOD PRESSURE: 65 MMHG

## 2019-06-20 PROCEDURE — 94760 N-INVAS EAR/PLS OXIMETRY 1: CPT

## 2019-06-20 PROCEDURE — 74011000250 HC RX REV CODE- 250: Performed by: SURGERY

## 2019-06-20 PROCEDURE — 74011250637 HC RX REV CODE- 250/637: Performed by: SURGERY

## 2019-06-20 PROCEDURE — 94640 AIRWAY INHALATION TREATMENT: CPT

## 2019-06-20 RX ADMIN — HYDROCODONE BITARTRATE AND ACETAMINOPHEN 1 TABLET: 5; 325 TABLET ORAL at 06:24

## 2019-06-20 RX ADMIN — TAMSULOSIN HYDROCHLORIDE 0.4 MG: 0.4 CAPSULE ORAL at 08:30

## 2019-06-20 RX ADMIN — ALBUTEROL SULFATE 2.5 MG: 2.5 SOLUTION RESPIRATORY (INHALATION) at 08:18

## 2019-06-20 RX ADMIN — HYDROCODONE BITARTRATE AND ACETAMINOPHEN 1 TABLET: 5; 325 TABLET ORAL at 02:32

## 2019-06-20 RX ADMIN — Medication 10 ML: at 06:15

## 2019-06-20 RX ADMIN — SENNOSIDES, DOCUSATE SODIUM 1 TABLET: 50; 8.6 TABLET, FILM COATED ORAL at 08:30

## 2019-06-20 NOTE — PROGRESS NOTES
Discharge instructions reviewed with spouse and patient. All questions answered. Incision with dressing c/d/i on discharge.

## 2019-06-20 NOTE — PROGRESS NOTES
Progress Note    Patient: Sunny York MRN: [id number]  SSN: [id number]    YOB: 1940  Age: 67 y.o. Sex: male      Admission Date: 6/18/2019    LOS: 2 days     1 Day Post-Op    PROCEDURE(S):  Aortic abdominal aneurysm repair, endovascular (EVAR)   IV US access, bilateral femoral cutdown    Subjective:     Patient reports moderate bilateral groin pain controlled by Norco, otherwise has no complaints. Tolerated breakfast. Voided post mckinney removal.    Objective:     Vitals:    06/20/19 0531 06/20/19 0533 06/20/19 0609 06/20/19 0700   BP: 149/69  165/77 142/65   Pulse: 90  90 83   Resp: (!) 33  18 8   Temp:    97.8 °F (36.6 °C)   SpO2:   96%    Weight:  184 lb 1.4 oz (83.5 kg)          Physical Exam:   GENERAL: alert, cooperative, no distress  EYE: EOM intact, no nystagmus  LUNG: clear to auscultation bilaterally, normal respiratory effort  HEART: regular rate and rhythm, soft systolic murmur  ABDOMEN: soft, minimally tender, slightly protuberant but not distended, bowel sounds normoactive  EXTREMITIES: warm, normal ROM; bilateral groins with surgical dressings in place, appropriate tenderness, minimal ecchymosis, no induration / erythema  PULSES: radial, brachial and DP 2+ palpable and symmetric bilaterally; 2+ right PT, 1+ left PT    Lab/Data Review: All lab results for the last 48 hours reviewed. Assessment / Plan / Recommendations / Medical Decision Making:     Principal Problem:    Abdominal aortic aneurysm (AAA) without rupture ([address] (6/18/2019)        Likely D/C home later this AM   Follow up with CTA then office appt in 1mo      Signed By: Jose Alberto Torres PA-C    June 20, 2019      Physician Assistant with [facility] Vascular Surgery  [employer].  Rosmery Mejía MD / Mikhail Pulido MD

## 2019-06-20 NOTE — DISCHARGE INSTRUCTIONS
MD Instructions:  Wound care:  - Remove surgical dressings on Friday, 6/21/2019. - Thereafter, wash groin wounds daily with liquid Dial soap (or other liquid antibacterial soap); use fingers or soft washcloth gently then pat area dry. - Keep incision clean and dry; cover with dry gauze and change gauze daily + any time it becomes damp.  - Do not apply lotions, creams or ointments to incisions. - Tissue adhesive (\"skin glue\") used over incision will flake or peel off on its own. Diet:  - As tolerated before surgery. Activity:  - Don't overdo your activity throughout the day for the next 14 days - no prolonged standing, no lifting more than 10lb. - Keep legs propped up when not walking.  - No driving while taking narcotics. - Do not drink alcohol while taking narcotics. - Starting Friday, 6/21/2019, you may shower - no tub baths, soaking or swimming. Medications:  - Start aspirin today, restart Plavix (clopidogrel) on Friday, 6/21/2019. - Use prescription pain medications if needed; if you do not wish to use narcotic pain medication or require less pain control, you may take acetaminophen (Tylenol, for example) or naproxen (Aleve, for example) following instructions on the label.  - Resume other home medications.     Follow up in the office with Dr. Buffy Drew on 7/25/2019 at 9:15 AM for post-EVAR recheck. Prior to that office appointment, you will have a CTA scan at the outpatient radiology department on 7/22/2019 at 11:15 AM (actual arrival time may be earlier; refer to department orders / original paperwork). If problems or questions arise, please call our office at [phone].

## 2019-06-20 NOTE — PROGRESS NOTES
Pt with discharge orders to return home this day. No discharge needs and/or CM needs at this time. Milestones met.      Care Management Interventions  Transition of Care Consult (CM Consult): Discharge Planning  Discharge Location  Discharge Placement: Home

## 2019-06-21 ENCOUNTER — PATIENT OUTREACH (OUTPATIENT)
Dept: CASE MANAGEMENT | Age: 79
End: 2019-06-21

## 2019-06-21 NOTE — PROGRESS NOTES
This note will not be viewable in [address] Transition of Care Discharge Follow-up Questionnaire   Date/Time of Call:   6/21/19   1044am   What was the patient hospitalized for? s/p  AORTIC ABDOMINAL ANEURYSM REPAIR ENDOVASCULAR (EVAR)     Does the patient understand his/her diagnosis and/or treatment and what happened during the hospitalization? Yes, spoke with patients daughter, Nani Jasso, she states understanding of diagnosis and treatment; and is agreeable to call. Rana states patient is doing well    Did the patient receive discharge instructions? Yes    CM Assessed Risk for Readmission:       Patient stated Risk for Readmission:      Low r/t diagnosis, comorbidities and/or complications of surgery     None stated     Review any discharge instructions (see discharge instructions/AVS in [facility]). Ask patient if they understand these. Do they have any questions? Reviewed, understanding is stated, no questions at this time       Were home services ordered (nursing, PT, OT, ST, etc.)? No    If so, has the first visit occurred? If not, why? (Assist with coordination of services if necessary.)   N/A   Was any DME ordered? No     If so, has it been received? If not, why?  (Assist patient in obtaining DME orders &/or equipment if necessary.) N/A   Complete a review of all medications (new, continued and discontinued meds per the D/C instructions and medication tab in [facility]). Completed     Were all new prescriptions filled? If not, why?  (Assist patient in obtaining medications if necessary  escalate for CCM &/or SW if ongoing issues are verbalized by pt or anticipated)   No new medications or changes at discharge   Does the patient understand the purpose and dosing instructions for all medications?   (If patient has questions, provide explanation and education.)   Yes   Does the patient have any problems in performing ADLs? (If patient is unable to perform ADLs  what is the limiting factor(s)? Do they have a support system that can assist? If no support system is present, discuss possible assistance that they may be able to obtain. Escalate for CCM/SW if ongoing issues are verbalized by pt or anticipated)   Independent with ADLs, patient does not hear well, Denise lives in home behind patient   Does the patient have all follow-up appointments scheduled? 7 day f/up with PCP?   (f/up with PCP may be w/in 14 days if patient has a f/up with their specialist w/in 7 days)    7-14 day f/up with specialist?   (or per discharge instructions)    If f/up has not been made  what actions has the care coordinator made to accomplish this? Has transportation been arranged? Yes        Dr. Art Fernández will call if need arises, routine 9/11/19    [facility] Radiology  for CT 7/22/19    Dr. Rae Coates vascular surg 7/25/19            Yes, there are no transportation needs at this time. Any other questions or concerns expressed by the patient? No other needs or concerns identified. Denise states her gratitude for follow up. Contact information for [facility] was given, instructed to call with new questions or concerns. Schedule next appointment with DAVID BRAVO Coordinator or refer to RN Case Manager/ per the workflow guidelines. When is care coordinators next follow-up call scheduled? If referred for CCM  what RN care manager was the referral assigned? Community Care Coordinator will follow per workflow guidelines.           Within 30 days   EFREN Call Completed By: Fany Eric LPN  Care Coordinator

## 2019-06-21 NOTE — PROCEDURES
[facility]  PROCEDURE NOTE    Name:  Justo Damico  MR#:  [id number]  :  1940  ACCOUNT #:  [de-identified]  DATE OF SERVICE:  2019      PREOPERATIVE DIAGNOSIS:  Abdominal aortic aneurysm. POSTOPERATIVE DIAGNOSIS:  Abdominal aortic aneurysm. PROCEDURE PERFORMED:  Endovascular abdominal aortic aneurysm repair, intravascular ultrasound x2, bilateral femoral cutdown. SURGEON:  Kathy Duarte MD    ASSISTANT:  Dr. Sharla March. ANESTHESIA:  General endotracheal.    ESTIMATED BLOOD LOSS:  Minimal.    SPECIMENS REMOVED:  None. COMPLICATIONS:  None. IMPLANTS:  See chart. TOTAL CONTRAST:  7 mL. DRAINS:  None. DESCRIPTION OF PROCEDURE. The patient was brought to the angio suite, placed on the angio table in the supine position. Following adequate general endotracheal anesthesia and time-out procedure, the anterior chest, abdomen, and upper thighs were draped and prepped in the sterile fashion. Vertical incisions were made in both the groins. Balloons were carried down to the level of the common femoral arteries. The common femoral arteries were identified, mobilized, encircled with vessel loops proximally and distally. Next, the common femoral arteries were punctured and 0.035 Amplatz Super Stiff wires were advanced via KMP catheters into the thoracic aorta. Next, from the right, a 12-Burmese sheath was advanced under direct vision into the distal aorta. From the left, an 18-Burmese sheath was advanced over the guidewire into the aorta. At this point, a pigtail catheter was advanced from the right and a CO2 arteriogram was performed identifying the origins of the renal arteries. The anatomy was somewhat difficult to sort out. Therefore, a 7 mL injection of contrast was also performed to confirm the location of the renal arteries for placement of the proximal portion of the main body device.   At this point, the main body device was deployed and the gate opened appropriately. The gate was then cannulated from the right and a pigtail catheter was advanced into the main body device, reformed, and then spun to ensure they were in fact within the lumen of the main body. Next, a CO2 injection was performed from the right and the length measurement was made using the marker pig catheter. The main body device was 31 x 14 x 15. The contralateral limb was 16 x 13.5. Following deployment of this limb, the remainder of the main body device was deployed by Dr. Tavo Oreilly and Ade Bermudez demonstrated the main body ipsilateral limb was fairly short. Therefore, an extension was placed on the left, which was a 16 x 9.5, again deployed by Dr. Tavo Oreilly. With this, all landing sites and union sites were dilated with balloon. A completion imaging study was performed with CO2 again demonstrating the same graft to be in excellent position. There was no filling of the aneurysm sac. At this point, over some concern about the narrow caliber of the distal aorta and to make sure that the limbs were fully deployed, IVUS was introduced on both sides and both limbs of the stent graft were interrogated, they passed through the origins of the iliac arteries. The iliac limb on the right appeared to be slightly compressed. This was dilated with a 12-mm balloon. Completion IVUS demonstrated to be much improved. At this point, the catheter was removed and the arterial puncture sites were closed with interrupted 5-0 Prolene sutures. Flow was restored. There was excellent flow in the feet. Protamine was administered to reverse heparin effect. At this point, the wounds were irrigated and the wounds were closed in layers of Vicryl suture by me on the right, by Dr. Tavo Oreilly on the left. At this point, sterile dry dressings were applied. The patient was awakened from anesthesia and transported to the recovery room in stable condition. The patient tolerated the procedure well. No complications. All needle and sponge counts were correct.       Caesar Tipton MD      [id number]  D:  06/20/2019 13:51  T:  06/20/2019 21:23  JOB #:  [id number]

## 2019-07-19 ENCOUNTER — PATIENT OUTREACH (OUTPATIENT)
Dept: CASE MANAGEMENT | Age: 79
End: 2019-07-19

## 2019-07-19 NOTE — PROGRESS NOTES
This note will not be viewable in [address] Transitions of Care  Follow up Outreach Note   Outreach type Phone call: spoke with patients daughter Shadia Molina visit:   Date/Time of Outreach: 7/19/19 1213pm     Has patient attended PCP or specialist follow-up appointments since last contact? What was outcome of appointment? When is next follow-up scheduled? Denise states patient is doing well. Next appointments remain as scheduled: [facility] 7/22/19, Dr. Ashley Salinas 7/29/19 and Dr. Geoff Ahuja 9/11/19   Review medications. Any medication changes since last outreach? Does patient have any questions or issues related to their medications? None stated    No      Home health active? If yes  any issue? Progress? No     Referrals needed?  (CM, SW, HH, etc. )   No      Other issues/Miscellaneous? (Transportation, access to meals, ability to perform ADLs, adequate caregiver support, etc.) No other needs or concerns at this time. Denise stated her gratitude for follow up. Next Outreach Scheduled?     Graduation from program?   N/A    Yes       Next Steps/Goals (if applicable):   N/A      Outreach completed by:   Kandace Tadeo LPN  Care Coordinator

## 2019-07-22 ENCOUNTER — HOSPITAL ENCOUNTER (OUTPATIENT)
Dept: CT IMAGING | Age: 79
Discharge: HOME | End: 2019-07-22
Attending: SURGERY
Payer: MEDICARE

## 2019-07-22 DIAGNOSIS — I71.40 AAA (ABDOMINAL AORTIC ANEURYSM) WITHOUT RUPTURE: Chronic | ICD-10-CM

## 2019-07-22 PROCEDURE — 74176 CT ABD & PELVIS W/O CONTRAST: CPT

## 2019-09-10 PROBLEM — R00.1 BRADYCARDIA: Status: ACTIVE | Noted: 2019-09-10

## 2019-09-23 PROBLEM — Z01.818 PRE-OP EXAM: Status: RESOLVED | Noted: 2019-01-04 | Resolved: 2019-09-23

## 2019-10-25 ENCOUNTER — HOSPITAL ENCOUNTER (OUTPATIENT)
Dept: CT IMAGING | Age: 79
Discharge: HOME | End: 2019-10-25
Attending: RADIOLOGY
Payer: MEDICARE

## 2019-10-25 DIAGNOSIS — C34.11 MALIGNANT NEOPLASM OF UPPER LOBE OF RIGHT LUNG (HCC): ICD-10-CM

## 2019-10-25 PROCEDURE — 71250 CT THORAX DX C-: CPT

## 2020-01-10 DIAGNOSIS — Z95.2 S/P TAVR (TRANSCATHETER AORTIC VALVE REPLACEMENT): Primary | ICD-10-CM

## 2020-04-23 ENCOUNTER — HOSPITAL ENCOUNTER (OUTPATIENT)
Dept: CT IMAGING | Age: 80
Discharge: HOME | End: 2020-04-23
Attending: RADIOLOGY
Payer: MEDICARE

## 2020-04-23 DIAGNOSIS — C34.11 PRIMARY CANCER OF RIGHT UPPER LOBE OF LUNG (HCC): ICD-10-CM

## 2020-04-23 PROCEDURE — 71250 CT THORAX DX C-: CPT

## 2020-07-23 ENCOUNTER — HOSPITAL ENCOUNTER (OUTPATIENT)
Dept: PET IMAGING | Age: 80
Discharge: HOME | End: 2020-07-23
Payer: MEDICARE

## 2020-07-23 DIAGNOSIS — C34.11 MALIGNANT NEOPLASM OF UPPER LOBE OF RIGHT LUNG (HCC): ICD-10-CM

## 2020-07-23 PROCEDURE — 74011636320 HC RX REV CODE- 636/320: Performed by: RADIOLOGY

## 2020-07-23 PROCEDURE — A9552 F18 FDG: HCPCS

## 2020-07-23 RX ORDER — SODIUM CHLORIDE 0.9 % (FLUSH) 0.9 %
10 SYRINGE (ML) INJECTION
Status: COMPLETED | OUTPATIENT
Start: 2020-07-23 | End: 2020-07-23

## 2020-07-23 RX ADMIN — Medication 10 ML: at 07:40

## 2020-07-23 RX ADMIN — DIATRIZOATE MEGLUMINE AND DIATRIZOATE SODIUM 10 ML: 660; 100 LIQUID ORAL; RECTAL at 07:40

## 2020-08-18 ENCOUNTER — APPOINTMENT (OUTPATIENT)
Dept: GENERAL RADIOLOGY | Age: 80
DRG: 871 | End: 2020-08-18
Attending: EMERGENCY MEDICINE
Payer: MEDICARE

## 2020-08-18 ENCOUNTER — APPOINTMENT (OUTPATIENT)
Dept: CT IMAGING | Age: 80
DRG: 871 | End: 2020-08-18
Attending: EMERGENCY MEDICINE
Payer: MEDICARE

## 2020-08-18 ENCOUNTER — HOSPITAL ENCOUNTER (INPATIENT)
Age: 80
LOS: 3 days | Discharge: HOME | DRG: 871 | End: 2020-08-21
Attending: EMERGENCY MEDICINE | Admitting: INTERNAL MEDICINE
Payer: MEDICARE

## 2020-08-18 DIAGNOSIS — R41.0 ACUTE DELIRIUM: Primary | ICD-10-CM

## 2020-08-18 DIAGNOSIS — L03.115 CELLULITIS OF RIGHT LOWER EXTREMITY: ICD-10-CM

## 2020-08-18 PROBLEM — A41.9 SEPSIS (HCC): Status: ACTIVE | Noted: 2020-08-18

## 2020-08-18 LAB
ALBUMIN SERPL-MCNC: 3.3 G/DL (ref 3.2–4.6)
ALBUMIN/GLOB SERPL: 0.9 {RATIO} (ref 1.2–3.5)
ALP SERPL-CCNC: 84 U/L (ref 50–136)
ALT SERPL-CCNC: 14 U/L (ref 12–65)
ANION GAP SERPL CALC-SCNC: 8 MMOL/L (ref 7–16)
AST SERPL-CCNC: 18 U/L (ref 15–37)
ATRIAL RATE: 101 BPM
BACTERIA URNS QL MICRO: NORMAL /HPF
BASOPHILS # BLD: 0.1 K/UL (ref 0–0.2)
BASOPHILS NFR BLD: 0 % (ref 0–2)
BILIRUB SERPL-MCNC: 0.9 MG/DL (ref 0.2–1.1)
BUN SERPL-MCNC: 31 MG/DL (ref 8–23)
CALCIUM SERPL-MCNC: 8.3 MG/DL (ref 8.3–10.4)
CALCULATED P AXIS, ECG09: 89 DEGREES
CALCULATED R AXIS, ECG10: -79 DEGREES
CALCULATED T AXIS, ECG11: 94 DEGREES
CASTS URNS QL MICRO: NORMAL /LPF
CHLORIDE SERPL-SCNC: 107 MMOL/L (ref 98–107)
CO2 SERPL-SCNC: 24 MMOL/L (ref 21–32)
CREAT SERPL-MCNC: 2.59 MG/DL (ref 0.8–1.5)
CRYSTALS URNS QL MICRO: 0 /LPF
DIAGNOSIS, 93000: NORMAL
DIFFERENTIAL METHOD BLD: ABNORMAL
EOSINOPHIL # BLD: 0 K/UL (ref 0–0.8)
EOSINOPHIL NFR BLD: 0 % (ref 0.5–7.8)
EPI CELLS #/AREA URNS HPF: 0 /HPF
ERYTHROCYTE [DISTWIDTH] IN BLOOD BY AUTOMATED COUNT: 12.7 % (ref 11.9–14.6)
GLOBULIN SER CALC-MCNC: 3.7 G/DL (ref 2.3–3.5)
GLUCOSE SERPL-MCNC: 113 MG/DL (ref 65–100)
HCT VFR BLD AUTO: 42 % (ref 41.1–50.3)
HGB BLD-MCNC: 14 G/DL (ref 13.6–17.2)
IMM GRANULOCYTES # BLD AUTO: 0.9 K/UL (ref 0–0.5)
IMM GRANULOCYTES NFR BLD AUTO: 3 % (ref 0–5)
LACTATE SERPL-SCNC: 2 MMOL/L (ref 0.4–2)
LYMPHOCYTES # BLD: 1.7 K/UL (ref 0.5–4.6)
LYMPHOCYTES NFR BLD: 6 % (ref 13–44)
MCH RBC QN AUTO: 32.5 PG (ref 26.1–32.9)
MCHC RBC AUTO-ENTMCNC: 33.3 G/DL (ref 31.4–35)
MCV RBC AUTO: 97.4 FL (ref 79.6–97.8)
MONOCYTES # BLD: 3 K/UL (ref 0.1–1.3)
MONOCYTES NFR BLD: 10 % (ref 4–12)
MUCOUS THREADS URNS QL MICRO: 0 /LPF
NEUTS SEG # BLD: 22.9 K/UL (ref 1.7–8.2)
NEUTS SEG NFR BLD: 80 % (ref 43–78)
NRBC # BLD: 0 K/UL (ref 0–0.2)
OTHER OBSERVATIONS,UCOM: NORMAL
P-R INTERVAL, ECG05: 272 MS
PLATELET # BLD AUTO: 141 K/UL (ref 150–450)
PMV BLD AUTO: 11.2 FL (ref 9.4–12.3)
POTASSIUM SERPL-SCNC: 4 MMOL/L (ref 3.5–5.1)
PROT SERPL-MCNC: 7 G/DL (ref 6.3–8.2)
Q-T INTERVAL, ECG07: 384 MS
QRS DURATION, ECG06: 174 MS
QTC CALCULATION (BEZET), ECG08: 497 MS
RBC # BLD AUTO: 4.31 M/UL (ref 4.23–5.6)
RBC #/AREA URNS HPF: 0 /HPF
SODIUM SERPL-SCNC: 139 MMOL/L (ref 136–145)
TROPONIN-HIGH SENSITIVITY: 233.6 PG/ML (ref 0–14)
VENTRICULAR RATE, ECG03: 101 BPM
WBC # BLD AUTO: 28.7 K/UL (ref 4.3–11.1)
WBC URNS QL MICRO: NORMAL /HPF

## 2020-08-18 PROCEDURE — 85025 COMPLETE CBC W/AUTO DIFF WBC: CPT

## 2020-08-18 PROCEDURE — 96375 TX/PRO/DX INJ NEW DRUG ADDON: CPT

## 2020-08-18 PROCEDURE — 65270000029 HC RM PRIVATE

## 2020-08-18 PROCEDURE — 99285 EMERGENCY DEPT VISIT HI MDM: CPT

## 2020-08-18 PROCEDURE — 74011250636 HC RX REV CODE- 250/636: Performed by: EMERGENCY MEDICINE

## 2020-08-18 PROCEDURE — 96366 THER/PROPH/DIAG IV INF ADDON: CPT

## 2020-08-18 PROCEDURE — 83605 ASSAY OF LACTIC ACID: CPT

## 2020-08-18 PROCEDURE — 74011250636 HC RX REV CODE- 250/636: Performed by: INTERNAL MEDICINE

## 2020-08-18 PROCEDURE — 84484 ASSAY OF TROPONIN QUANT: CPT

## 2020-08-18 PROCEDURE — 71045 X-RAY EXAM CHEST 1 VIEW: CPT

## 2020-08-18 PROCEDURE — 81015 MICROSCOPIC EXAM OF URINE: CPT

## 2020-08-18 PROCEDURE — 93005 ELECTROCARDIOGRAM TRACING: CPT | Performed by: EMERGENCY MEDICINE

## 2020-08-18 PROCEDURE — 81003 URINALYSIS AUTO W/O SCOPE: CPT

## 2020-08-18 PROCEDURE — 74011000250 HC RX REV CODE- 250: Performed by: INTERNAL MEDICINE

## 2020-08-18 PROCEDURE — 87205 SMEAR GRAM STAIN: CPT

## 2020-08-18 PROCEDURE — 70450 CT HEAD/BRAIN W/O DYE: CPT

## 2020-08-18 PROCEDURE — 80053 COMPREHEN METABOLIC PANEL: CPT

## 2020-08-18 PROCEDURE — 96365 THER/PROPH/DIAG IV INF INIT: CPT

## 2020-08-18 PROCEDURE — 87150 DNA/RNA AMPLIFIED PROBE: CPT

## 2020-08-18 PROCEDURE — 87040 BLOOD CULTURE FOR BACTERIA: CPT

## 2020-08-18 PROCEDURE — 74011000258 HC RX REV CODE- 258: Performed by: EMERGENCY MEDICINE

## 2020-08-18 PROCEDURE — 74011250637 HC RX REV CODE- 250/637: Performed by: INTERNAL MEDICINE

## 2020-08-18 RX ORDER — POLYETHYLENE GLYCOL 3350 17 G/17G
17 POWDER, FOR SOLUTION ORAL DAILY PRN
Status: DISCONTINUED | OUTPATIENT
Start: 2020-08-18 | End: 2020-08-21 | Stop reason: HOSPADM

## 2020-08-18 RX ORDER — HEPARIN SODIUM 5000 [USP'U]/ML
5000 INJECTION, SOLUTION INTRAVENOUS; SUBCUTANEOUS EVERY 12 HOURS
Status: DISCONTINUED | OUTPATIENT
Start: 2020-08-18 | End: 2020-08-19

## 2020-08-18 RX ORDER — SODIUM CHLORIDE 0.9 % (FLUSH) 0.9 %
5-40 SYRINGE (ML) INJECTION EVERY 8 HOURS
Status: DISCONTINUED | OUTPATIENT
Start: 2020-08-18 | End: 2020-08-21 | Stop reason: HOSPADM

## 2020-08-18 RX ORDER — LORAZEPAM 2 MG/ML
1 INJECTION INTRAMUSCULAR
Status: COMPLETED | OUTPATIENT
Start: 2020-08-18 | End: 2020-08-18

## 2020-08-18 RX ORDER — ACETAMINOPHEN 650 MG/1
650 SUPPOSITORY RECTAL
Status: DISCONTINUED | OUTPATIENT
Start: 2020-08-18 | End: 2020-08-21 | Stop reason: HOSPADM

## 2020-08-18 RX ORDER — PREDNISONE 10 MG/1
5 TABLET ORAL DAILY
Status: DISCONTINUED | OUTPATIENT
Start: 2020-08-19 | End: 2020-08-21 | Stop reason: HOSPADM

## 2020-08-18 RX ORDER — ALBUTEROL SULFATE 0.83 MG/ML
2.5 SOLUTION RESPIRATORY (INHALATION)
Status: DISCONTINUED | OUTPATIENT
Start: 2020-08-18 | End: 2020-08-21 | Stop reason: HOSPADM

## 2020-08-18 RX ORDER — SODIUM CHLORIDE 0.9 % (FLUSH) 0.9 %
5-40 SYRINGE (ML) INJECTION AS NEEDED
Status: DISCONTINUED | OUTPATIENT
Start: 2020-08-18 | End: 2020-08-21 | Stop reason: HOSPADM

## 2020-08-18 RX ORDER — VANCOMYCIN HYDROCHLORIDE
1250 ONCE
Status: COMPLETED | OUTPATIENT
Start: 2020-08-18 | End: 2020-08-18

## 2020-08-18 RX ORDER — LATANOPROST 50 UG/ML
1 SOLUTION/ DROPS OPHTHALMIC EVERY EVENING
Status: DISCONTINUED | OUTPATIENT
Start: 2020-08-19 | End: 2020-08-21 | Stop reason: HOSPADM

## 2020-08-18 RX ORDER — BRIMONIDINE TARTRATE 2 MG/ML
1 SOLUTION/ DROPS OPHTHALMIC
Status: DISCONTINUED | OUTPATIENT
Start: 2020-08-18 | End: 2020-08-21 | Stop reason: HOSPADM

## 2020-08-18 RX ORDER — ACETAMINOPHEN 325 MG/1
650 TABLET ORAL
Status: DISCONTINUED | OUTPATIENT
Start: 2020-08-18 | End: 2020-08-21 | Stop reason: HOSPADM

## 2020-08-18 RX ORDER — ONDANSETRON 2 MG/ML
4 INJECTION INTRAMUSCULAR; INTRAVENOUS
Status: DISCONTINUED | OUTPATIENT
Start: 2020-08-18 | End: 2020-08-21 | Stop reason: HOSPADM

## 2020-08-18 RX ORDER — PROMETHAZINE HYDROCHLORIDE 25 MG/1
12.5 TABLET ORAL
Status: DISCONTINUED | OUTPATIENT
Start: 2020-08-18 | End: 2020-08-21 | Stop reason: HOSPADM

## 2020-08-18 RX ORDER — BUDESONIDE AND FORMOTEROL FUMARATE DIHYDRATE 160; 4.5 UG/1; UG/1
2 AEROSOL RESPIRATORY (INHALATION)
Status: DISCONTINUED | OUTPATIENT
Start: 2020-08-18 | End: 2020-08-21 | Stop reason: HOSPADM

## 2020-08-18 RX ORDER — HYDRALAZINE HYDROCHLORIDE 20 MG/ML
10 INJECTION INTRAMUSCULAR; INTRAVENOUS
Status: DISCONTINUED | OUTPATIENT
Start: 2020-08-18 | End: 2020-08-18

## 2020-08-18 RX ORDER — ASPIRIN 81 MG/1
81 TABLET ORAL DAILY
Status: DISCONTINUED | OUTPATIENT
Start: 2020-08-19 | End: 2020-08-21 | Stop reason: HOSPADM

## 2020-08-18 RX ADMIN — HEPARIN SODIUM 5000 UNITS: 5000 INJECTION INTRAVENOUS; SUBCUTANEOUS at 22:11

## 2020-08-18 RX ADMIN — LORAZEPAM 1 MG: 2 INJECTION INTRAMUSCULAR; INTRAVENOUS at 15:47

## 2020-08-18 RX ADMIN — VANCOMYCIN HYDROCHLORIDE 1250 MG: 10 INJECTION, POWDER, LYOPHILIZED, FOR SOLUTION INTRAVENOUS at 18:28

## 2020-08-18 RX ADMIN — Medication 5 ML: at 22:11

## 2020-08-18 RX ADMIN — SODIUM CHLORIDE, SODIUM LACTATE, POTASSIUM CHLORIDE, AND CALCIUM CHLORIDE 1000 ML: 600; 310; 30; 20 INJECTION, SOLUTION INTRAVENOUS at 16:10

## 2020-08-18 RX ADMIN — SODIUM CHLORIDE 1000 ML: 900 INJECTION, SOLUTION INTRAVENOUS at 18:20

## 2020-08-18 RX ADMIN — BRIMONIDINE TARTRATE 1 DROP: 2 SOLUTION OPHTHALMIC at 22:31

## 2020-08-18 RX ADMIN — PIPERACILLIN SODIUM AND TAZOBACTAM SODIUM 4.5 G: 4; .5 INJECTION, POWDER, LYOPHILIZED, FOR SOLUTION INTRAVENOUS at 16:12

## 2020-08-18 RX ADMIN — ACETAMINOPHEN 650 MG: 325 TABLET, FILM COATED ORAL at 18:33

## 2020-08-18 NOTE — H&P
Admit date: 2020   Name:  Hans Montana   Age:  79 y.o.   :  1940   MRN:  [id number]   PCP:  Imelda Rosado MD   Provider:  Snow Ca MD      CHIEF COMPLAINT:  Confusion      HISTORY OF PRESENT ILLNESS:  36-year-old male with past medical history of AAA, coronary artery disease, Heart failure with preserved ejection fraction, CKD stage III, COPD, hyperlipidemia, hypertension, presents in the setting of confusion associated with right lower extremity redness and pain. Per patient and wife, he was in his usual state of health until a few days ago when he started presented with worsening confusion associated with right lower extremity pain as well as some fever chills which did not improve over time so they decided to come to the emergency department. He denies any respiratory symptoms like cough, no shortness of breath, also no chest pain. No nausea vomiting or diarrhea. The wife also states his blood pressure in the morning was high so she gave him some extra Lasix. Here in the emergency department patient was found to be febrile, tachypneic, hypotensive with leukocytosis concerning of sepsis. He was given 1 dose of Zosyn in the emergency department. He will be admitted to the medical floors for further management of sepsis concerning of lower extremity cellulitis versus UTI. PAST MEDICAL HISTORY:  Past Medical History:   Diagnosis Date    AAA (abdominal aortic aneurysm) without rupture (HCC)     per CT scan dated 19=There is a infrarenal abdominal aortic aneurysm measuring 5.5 x 5.1 cm the celiac artery demonstrates stenosis at its origin. ; previous followed by=Nasim Price MD ; has a new pt. appt.  with Dr. Shantanu Llamas on 19    Adverse effect of anesthesia     stopped breathing during gallbladder surgery in [facility]; had another surgery right after gallbladder surgery at [facility] and they all most lost him per patient's wife; no complications with recent surgeries in 2018 at [facility] CAD (coronary artery disease)     denies MI; no stents; hx. of CABG    CHF (congestive heart failure) ([address]     hospitalized 04/16/19; on Lasix     Chronic kidney disease     no nephrologist; stage 3- pt denies any kidney failure, only kidney stones; last creatinine=2.0 on 05/02/19     Chronic kidney disease, stage 3 (HCC)     Chronic pain     due to neuropathy    Complete heart block ([address] 02/13/2019    pacemaker     COPD (chronic obstructive pulmonary disease) ([facility])     nebulizer prn; rescue inhaler prn; daily inhaler     Diastolic congestive heart failure ([facility])     SANCHEZ (dyspnea on exertion)     Emphysema (subcutaneous) (surgical) resulting from a procedure     Emphysema lung ([address]     Former smoker     GERD (gastroesophageal reflux disease)     tums prn     Heart murmur     \"I was diagnosed at long time ago\"; last echo 03/21/19; s/p TAVR    Hemorrhoid     High cholesterol     no meds    History of kidney stones     multiple-surgical intervention and naturally pass    History of sepsis 10/2018    related to kidney stone    History of skin cancer     Hx of CABG 2005    Hypertension     no BP meds since TAVR    Hypoxia     Long term (current) use of systemic steroids     Prednisone    Lung cancer ([facility])     stage 2; followed by [facility]; Bronchoscopy 05/09/19    Lung nodule     MRSA (methicillin resistant staph aureus) culture positive     positive nasal swab collected on PAT     MSSA (methicillin susceptible Staphylococcus aureus)     positive MSSA nasal swab collected at PAT    Nephrolithiasis     Neuropathy     feet and hands    On home oxygen therapy     2L/NC at bedtime and during the day prn for sob     Oxygen dependent     2 liters as needed    Pacemaker 02/13/2019    Biotronik     RA (rheumatoid arthritis) ([address]     managed with Remicade    Renal artery stenosis ([address]     Severe aortic stenosis     S/P TAVR on 02/12/19    Tattoo Left FA         HOME MEDICATION:  Prior to Admission medications    Medication Sig Start Date End Date Taking? Authorizing Provider   furosemide (LASIX) 40 mg tablet Take 1.5 Tabs by mouth daily. Patient taking differently: Take 40 mg by mouth daily. 4/18/19   Germán Montanez MD   potassium chloride (K-DUR, KLOR-CON) 20 mEq tablet Take 2 Tabs by mouth daily. Patient taking differently: Take 20 mEq by mouth daily. 4/18/19   Eleazar Fay MD   aspirin delayed-release 81 mg tablet Take 81 mg by mouth daily. Take / use AM day of surgery  per anesthesia protocols. Provider, Historical   predniSONE (DELTASONE) 5 mg tablet Take 5 mg by mouth daily. Take / use AM day of surgery  per anesthesia protocols. Provider, Historical   infliximab (REMICADE IV) by IntraVENous route. Every 7 weeks; last infusion 02/2019    Provider, Historical   acetaminophen (TYLENOL) 325 mg cap Take 1,000 mg by mouth every six (6) hours as needed for Pain. Take / use AM day of surgery  per anesthesia protocols if needed. Indications: Pain    Provider, Historical   HYDROcodone-acetaminophen (NORCO) 7.5-325 mg per tablet Take 1 Tab by mouth nightly. Indications: Pain    Provider, Historical   travoprost (TRAVATAN Z) 0.004 % ophthalmic solution Administer 1 Drop to left eye nightly. Provider, Historical   brimonidine (ALPHAGAN P) 0.1 % ophthalmic solution Administer 1 Drop to left eye nightly. Provider, Historical   albuterol (PROVENTIL VENTOLIN) 2.5 mg /3 mL (0.083 %) nebulizer solution 2.5 mg by Nebulization route three (3) times daily as needed for Wheezing or Shortness of Breath. Take / use AM day of surgery  per anesthesia protocols if needed. Provider, Historical   tamsulosin (FLOMAX) 0.4 mg capsule Take 0.4 mg by mouth daily. Take / use AM day of surgery  per anesthesia protocols.     Provider, Historical   albuterol (PROAIR HFA) 90 mcg/actuation inhaler Take 2 Puffs by inhalation every four (4) hours as needed for Wheezing or Shortness of Breath. Take / use AM day of surgery  per anesthesia protocol if needed. Provider, Historical   cholecalciferol (VITAMIN D3) 1,000 unit tablet Take 1,000 Units by mouth daily.     Provider, Historical         REVIEW OF SYSTEMS:  14 ROS negative except from stated on HPI      SOCIAL HISTORY:  Social History     Tobacco Use    Smoking status: Former Smoker     Packs/day: 3.00     Years: 50.00     Pack years: 150.00     Types: Cigarettes     Last attempt to quit: 1/3/2004     Years since quittin.6    Smokeless tobacco: Never Used   Substance Use Topics    Alcohol use: No     Frequency: Never    Drug use: No         FAMILY HISTORY:  Family History   Problem Relation Age of Onset    Kidney Disease Mother     Diabetes Mother     Heart Attack Father     Liver Disease Sister     Heart Disease Brother     Heart Attack Brother     Heart Surgery Brother     Heart Failure Brother          ALLERGIES:  Allergies   Allergen Reactions    Methotrexate Other (comments)     \"hemorrhage\" per wife         VITAL SIGNS:  Vitals:    20 1553 20 1818 20 1824 20 1839   BP: 100/53  102/60    Pulse: 78  70    Resp: 27  24    Temp:   (!) 102.2 °F (39 °C)    SpO2:   98% 98%   Weight:  81.6 kg (179 lb 14.3 oz)     Height:  5' 10\" (1.778 m)           PHYSICAL EXAM:  General: Alert, mildly consfused, NAD  HEENT: NC/AT, EOM are intact  Neck: supple, no JVD  Cardiovascular: RRR, S1, S2, no murmurs  Respiratory: Lungs are clear, no wheezes or rales  Abdomen: Soft, NT, ND  Back: No CVA tenderness, no paraspinal tenderness  Extremities: LE without pedal edema, no erythema  Neuro:CN are intact, no focal deficits  Skin: no rash or ulcers  Psych: good mood and affect      I have personally reviewed patients laboratory data showing  Lab Results   Component Value Date    WBC 28.7 (H) 2020    HGB 14.0 2020    HCT 42.0 2020    MCV 97.4 2020     (L) 2020 No results found for: CKMB  Lab Results   Component Value Date     (H) 08/18/2020     08/18/2020    K 4.0 08/18/2020    CO2 24 08/18/2020     08/18/2020    BUN 31 (H) 08/18/2020         I have personally reviewed patients EKG showing  Sinus tachycardia, first-degree AV block, no acute ischemic changes      I have personally reviewed patients imaging showing  CT HEAD WO CONT   Final Result   IMPRESSION:   No acute intracranial abnormality. Chronic changes as described. XR CHEST PORT   Final Result   IMPRESSION:      1. Probable mild volume overload with interstitial pulmonary edema and small   bilateral pleural effusions. 2. Similar appearance of an irregular nodular opacity in the right upper lobe,   recently evaluated with PET/CT. ASSESSMENT AND PLAN  32-year-old male with past medical history of AAA, coronary artery disease, Heart failure with preserved ejection fraction, CKD stage III, COPD, hyperlipidemia, hypertension, presents in the setting of confusion associated with right lower extremity redness and pain. 1.  Sepsis(fever, tachypnea, leukocytosis) likely in setting of right lower extremity cellulitis versus UTI. Patient was given 1 dose of Zosyn in the emergency department which we will continue for now we will also start the patient on IV vancomycin. He was given 1 L of LR in the emergency department. Will give another liter of NS. Due to patient's history of heart failure will need to be cautious with volume resuscitation. Follow urine culture and blood cultures. Follow vital signs closely. Lactic acid of 2.0. Monitor CBC. 2.  Heart failure with preserved ejection fraction. Will hold Lasix for now due to sepsis. Will resume Lasix with improving hemodynamics. 3.  COPD on oxygen at home at 2 L baseline. Currently not on exacerbation. We will start the patient on Symbicort. Albuterol as needed.   Oxygen therapy to maintain an oxygen saturation more than 92%. 4.  CKD stage III. Avoid nephrotoxic agents. Monitor renal function closely. 5.  Hypertension. Will hold Lasix and lisinopril for now due to sepsis.     DVT prophylaxis with heparin subcu    Full code    Patient and wife aware of plan    Likely length of stay more than 2 midnights due to sepsis requiring further management

## 2020-08-18 NOTE — ED TRIAGE NOTES
Pt BIB wife, states that she found him around 1030 this morning \"walking funny\" and altered. States that he isn't confused but he saying bizarre things. Denies fall.

## 2020-08-18 NOTE — PROGRESS NOTES
Vancomycin Pharmacokinetic Consult    Susan Mccarty is a 79 y.o. male being treated for sepsis 2/2 RLE cellulitis with piperacillin/tazobactam and vancomycin. Height: 5' 10\" (177.8 cm)  Weight: 81.6 kg (179 lb 14.3 oz)  Lab Results   Component Value Date/Time    BUN 31 (H) 08/18/2020 03:36 PM    Creatinine 2.59 (H) 08/18/2020 03:36 PM    WBC 28.7 (H) 08/18/2020 03:36 PM    Lactic acid 2.0 08/18/2020 04:07 PM      Estimated Creatinine Clearance: 23.9 mL/min (A) (based on SCr of 2.59 mg/dL (H)). CULTURES:  8/18 - Blood x2: In process    Day 1 of vancomycin. Goal trough is 10-15. Vancomycin dose initiated at 1250 mg x1 in the ED. Will dose intermittently at this time and order levels when clinically indicated.     Antolin Tee PharmD, BCPS  Clinical Pharmacist  [phone]

## 2020-08-18 NOTE — ED PROVIDER NOTES
Aury Guillen is a 79 y.o. male seen on 8/18/2020 at 3:43 PM in the [facility] EMERGENCY DEPT in room ERA/A. Chief Complaint   Patient presents with    Gait Problem     HPI: 27-year-old male presenting to the emergency department with his spouse for altered mental status. The majority of the history is provided by the wife. She states that today the patient has not been acting per his normal baseline. She states he seemed goofy, acting bizarrely. She states he was sitting in a chair today when the dog started barking and he started barking back at them which is unusual for him. She also notes that he urinated in the living room twice which again is not his normal behavior. He is not responded to typical conversation the way he would. He is also had episodes where his had difficulty ambulating. He will have periods where he does not seem to be following instructions or you have difficulty moving his feet. She states it almost seems like he \"forgets what to do. \"    Historian: Patient    REVIEW OF SYSTEMS     Review of Systems   Constitutional: Positive for chills. Negative for fatigue and fever. HENT: Negative. Eyes: Negative. Respiratory: Negative for cough, chest tightness, shortness of breath and wheezing. Cardiovascular: Negative for chest pain. Gastrointestinal: Negative for abdominal distention, abdominal pain, diarrhea, nausea and vomiting. Genitourinary: Negative for dysuria, flank pain, frequency, genital sores and urgency. Musculoskeletal: Negative. Skin: Negative for rash. Neurological: Negative for dizziness, syncope and headaches. Psychiatric/Behavioral: Positive for behavioral problems. All other systems reviewed and are negative.       PAST MEDICAL HISTORY     Past Medical History:   Diagnosis Date    AAA (abdominal aortic aneurysm) without rupture ([facility] [address]     per CT scan dated 01/24/19=There is a infrarenal abdominal aortic aneurysm measuring 5.5 x 5.1 cm the celiac artery demonstrates stenosis at its origin. ; previous followed by=Maynor Price MD ; has a new pt. appt.  with Dr. Alejandra Vogel on 05/23/19    Adverse effect of anesthesia     stopped breathing during gallbladder surgery in [facility]; had another surgery right after gallbladder surgery at [address] and they all most lost him per patient's wife; no complications with recent surgeries in 2018 at [facility] CAD (coronary artery disease)     denies MI; no stents; hx. of CABG    CHF (congestive heart failure) ([address]     hospitalized 04/16/19; on Lasix     Chronic kidney disease     no nephrologist; stage 3- pt denies any kidney failure, only kidney stones; last creatinine=2.0 on 05/02/19     Chronic kidney disease, stage 3 (HCC)     Chronic pain     due to neuropathy    Complete heart block ([address] 02/13/2019    pacemaker     COPD (chronic obstructive pulmonary disease) ([address]     nebulizer prn; rescue inhaler prn; daily inhaler     Diastolic congestive heart failure ([address]     SANCHEZ (dyspnea on exertion)     Emphysema (subcutaneous) (surgical) resulting from a procedure     Emphysema lung ([address]     Former smoker     GERD (gastroesophageal reflux disease)     tums prn     Heart murmur     \"I was diagnosed at long time ago\"; last echo 03/21/19; s/p TAVR    Hemorrhoid     High cholesterol     no meds    History of kidney stones     multiple-surgical intervention and naturally pass    History of sepsis 10/2018    related to kidney stone    History of skin cancer     Hx of CABG 2005    Hypertension     no BP meds since TAVR    Hypoxia     Long term (current) use of systemic steroids     Prednisone    Lung cancer ([address]     stage 2; [facility] [place]; Bronchoscopy 05/09/19    Lung nodule     MRSA (methicillin resistant staph aureus) culture positive     positive nasal swab collected on PAT     MSSA (methicillin susceptible Staphylococcus aureus)     positive MSSA nasal swab collected at PAT    Nephrolithiasis     Neuropathy     feet and hands    On home oxygen therapy     2L/NC at bedtime and during the day prn for sob     Oxygen dependent     2 liters as needed    Pacemaker 02/13/2019    Biotronik     RA (rheumatoid arthritis) ([address]     managed with Remicade    Renal artery stenosis (HCC)     Severe aortic stenosis     S/P TAVR on 02/12/19    Tattoo     Left FA     Past Surgical History:   Procedure Laterality Date    CABG, ARTERY-VEIN, THREE  2004    HX AORTIC VALVE REPLACEMENT  02/12/2019    TAVR    HX APPENDECTOMY  13years old    HX CATARACT REMOVAL Bilateral     HX CHOLECYSTECTOMY      HX COLONOSCOPY      HX CORONARY ARTERY BYPASS GRAFT  2005?    3 vessel    HX HEART CATHETERIZATION  05/06/2014, 01/18/19    no stents     HX HEENT Right 04/2018    eye surgery for glaucoma     HX PACEMAKER  02/2019    HX PACEMAKER PLACEMENT Left 02/13/2019    Biotronik     HX UROLOGICAL  10/24/2018    CYSTOURETHROSCOPY,W/URETEROSCOPY/PYELOSCOPY; W/LITHOTRIPSY INCLUDING INSERTION INDWELLING URETERAL STENT** CYSTOSCOPY, BILATERAL URETEROSCOPY, LASER LITHOTRIPSY, BILATERAL RETROGRADE PYELOGRAM, WITH BILATERAL URETERAL STENT EXCHANGE**    HX UROLOGICAL  10/03/2018    CYSTOURETHROSCOPY, W/INSERTION INDWELLING URETERAL STENT (C-ARM)    HX UROLOGICAL  09/19/2018    CT CYSTO/URETERO W/LITHOTRIPSY &INDWELL STENT INSRT      VASCULAR SURGERY PROCEDURE UNLIST  06/19/2019    Endovascular abdominal aortic aneurysm repair, intravascular ultrasound x2, bilateral femoral cutdown.      Social History     Socioeconomic History    Marital status:      Spouse name: Not on file    Number of children: Not on file    Years of education: Not on file    Highest education level: Not on file   Tobacco Use    Smoking status: Former Smoker     Packs/day: 3.00     Years: 50.00     Pack years: 150.00     Types: Cigarettes     Last attempt to quit: 1/3/2004 Years since quittin.6    Smokeless tobacco: Never Used   Substance and Sexual Activity    Alcohol use: No     Frequency: Never    Drug use: No     Prior to Admission Medications   Prescriptions Last Dose Informant Patient Reported? Taking? HYDROcodone-acetaminophen (NORCO) 7.5-325 mg per tablet   Yes No   Sig: Take 1 Tab by mouth nightly. Indications: Pain   acetaminophen (TYLENOL) 325 mg cap   Yes No   Sig: Take 1,000 mg by mouth every six (6) hours as needed for Pain. Take / use AM day of surgery  per anesthesia protocols if needed. Indications: Pain   albuterol (PROAIR HFA) 90 mcg/actuation inhaler   Yes No   Sig: Take 2 Puffs by inhalation every four (4) hours as needed for Wheezing or Shortness of Breath. Take / use AM day of surgery  per anesthesia protocol if needed. albuterol (PROVENTIL VENTOLIN) 2.5 mg /3 mL (0.083 %) nebulizer solution   Yes No   Si.5 mg by Nebulization route three (3) times daily as needed for Wheezing or Shortness of Breath. Take / use AM day of surgery  per anesthesia protocols if needed. aspirin delayed-release 81 mg tablet   Yes No   Sig: Take 81 mg by mouth daily. Take / use AM day of surgery  per anesthesia protocols. brimonidine (ALPHAGAN P) 0.1 % ophthalmic solution   Yes No   Sig: Administer 1 Drop to left eye nightly. cholecalciferol (VITAMIN D3) 1,000 unit tablet   Yes No   Sig: Take 1,000 Units by mouth daily. furosemide (LASIX) 40 mg tablet   No No   Sig: Take 1.5 Tabs by mouth daily. Patient taking differently: Take 40 mg by mouth daily. infliximab (REMICADE IV)   Yes No   Sig: by IntraVENous route. Every 7 weeks; last infusion 2019   potassium chloride (K-DUR, KLOR-CON) 20 mEq tablet   No No   Sig: Take 2 Tabs by mouth daily. Patient taking differently: Take 20 mEq by mouth daily. predniSONE (DELTASONE) 5 mg tablet   Yes No   Sig: Take 5 mg by mouth daily. Take / use AM day of surgery  per anesthesia protocols.    tamsulosin (FLOMAX) 0.4 mg capsule   Yes No   Sig: Take 0.4 mg by mouth daily. Take / use AM day of surgery  per anesthesia protocols. travoprost (TRAVATAN Z) 0.004 % ophthalmic solution   Yes No   Sig: Administer 1 Drop to left eye nightly. Facility-Administered Medications: None     Allergies   Allergen Reactions    Methotrexate Other (comments)     \"hemorrhage\" per wife        PHYSICAL EXAM       Vitals:    08/18/20 1527   Pulse: 95   Resp: (!) 32   SpO2: 96%    Vital signs were reviewed. Physical Exam  Vitals signs reviewed. Constitutional:       General: He is not in acute distress. Appearance: He is well-developed. He is not diaphoretic. HENT:      Head: Normocephalic and atraumatic. Eyes:      General: No visual field deficit. Pupils: Pupils are equal, round, and reactive to light. Comments: Rightward eye deviation of R eye (baseline)   Neck:      Musculoskeletal: Normal range of motion and neck supple. Cardiovascular:      Rate and Rhythm: Normal rate and regular rhythm. Heart sounds: Murmur (mechanical click) present. No friction rub. No gallop. Pulmonary:      Effort: Pulmonary effort is normal. No respiratory distress. Breath sounds: Normal breath sounds. No wheezing. Abdominal:      General: Bowel sounds are normal. There is no distension. Palpations: Abdomen is soft. There is no mass. Tenderness: There is no abdominal tenderness. There is no guarding or rebound. Musculoskeletal: Normal range of motion. General: No tenderness or deformity. Skin:     General: Skin is warm. Findings: Erythema (diffuse erythema over anterior RLE with increased warmth) present. No rash. Neurological:      Mental Status: He is alert and oriented to person, place, and time. GCS: GCS eye subscore is 4. GCS verbal subscore is 5. GCS motor subscore is 6. Cranial Nerves: Cranial nerves are intact. No cranial nerve deficit, dysarthria or facial asymmetry.       Sensory: Sensation is intact. No sensory deficit. Motor: Motor function is intact. No weakness or atrophy. Coordination: Coordination is intact. Romberg sign negative. Coordination normal.      Comments: No focal deficits   Psychiatric:         Thought Content: Thought content normal.      Comments: dao          MEDICAL DECISION MAKING     MDM  Procedures    ED Course:    3:28 PM  I was called to triage by the triage nurse after the patient had difficulty ambulating in the hallway. Hx is somewhat difficult as the patient is a poor historian. On my assessment in triage, patient has no focal deficits, he is able to ambulate without difficulty. He has full strength throughout, no facial asymmetry, no change in speech. His rightward gaze deviation of the right is baseline. He denies dizziness. No Code S called. 3:48 PM  BP retaken, 115/63 in L arm, 106/57 in R arm. Pt had been fighting and clenching during the first blood pressure that was noted to be elevated. 3:54 PM  Pt now noted to be febrile, has significant leukocytosis concerning for sepsis. 4:47 PM  Discussed with hospitalist, concern for cellulitis. UA shows leukoesterase, no nitrites. In the lab for for urinalysis. Disposition:  Admission to the hospital  Diagnosis: Sepsis, cellulitis right lower extremity, acute delirium  ____________________________________________________________________  A portion of this note was generated using voice recognition dictation software. While the note has been reviewed for accuracy, please note certain words and phrases may not be transcribed as intended and some grammatical and/or typographical errors may be present.

## 2020-08-19 LAB
ANION GAP SERPL CALC-SCNC: 6 MMOL/L (ref 7–16)
BUN SERPL-MCNC: 32 MG/DL (ref 8–23)
CALCIUM SERPL-MCNC: 8.2 MG/DL (ref 8.3–10.4)
CHLORIDE SERPL-SCNC: 112 MMOL/L (ref 98–107)
CO2 SERPL-SCNC: 24 MMOL/L (ref 21–32)
CREAT SERPL-MCNC: 2.49 MG/DL (ref 0.8–1.5)
ERYTHROCYTE [DISTWIDTH] IN BLOOD BY AUTOMATED COUNT: 12.9 % (ref 11.9–14.6)
GLUCOSE SERPL-MCNC: 87 MG/DL (ref 65–100)
HCT VFR BLD AUTO: 40.9 % (ref 41.1–50.3)
HGB BLD-MCNC: 12.8 G/DL (ref 13.6–17.2)
MCH RBC QN AUTO: 31.3 PG (ref 26.1–32.9)
MCHC RBC AUTO-ENTMCNC: 31.3 G/DL (ref 31.4–35)
MCV RBC AUTO: 100 FL (ref 79.6–97.8)
NRBC # BLD: 0 K/UL (ref 0–0.2)
PLATELET # BLD AUTO: 125 K/UL (ref 150–450)
PMV BLD AUTO: 11.5 FL (ref 9.4–12.3)
POTASSIUM SERPL-SCNC: 4.2 MMOL/L (ref 3.5–5.1)
RBC # BLD AUTO: 4.09 M/UL (ref 4.23–5.6)
SODIUM SERPL-SCNC: 142 MMOL/L (ref 136–145)
VANCOMYCIN SERPL-MCNC: 10.5 UG/ML
WBC # BLD AUTO: 19.4 K/UL (ref 4.3–11.1)

## 2020-08-19 PROCEDURE — 74011250636 HC RX REV CODE- 250/636: Performed by: INTERNAL MEDICINE

## 2020-08-19 PROCEDURE — 74011250637 HC RX REV CODE- 250/637: Performed by: INTERNAL MEDICINE

## 2020-08-19 PROCEDURE — 94760 N-INVAS EAR/PLS OXIMETRY 1: CPT

## 2020-08-19 PROCEDURE — 36415 COLL VENOUS BLD VENIPUNCTURE: CPT

## 2020-08-19 PROCEDURE — 94640 AIRWAY INHALATION TREATMENT: CPT

## 2020-08-19 PROCEDURE — 65270000029 HC RM PRIVATE

## 2020-08-19 PROCEDURE — 74011000250 HC RX REV CODE- 250: Performed by: INTERNAL MEDICINE

## 2020-08-19 PROCEDURE — 85027 COMPLETE CBC AUTOMATED: CPT

## 2020-08-19 PROCEDURE — 80202 ASSAY OF VANCOMYCIN: CPT

## 2020-08-19 PROCEDURE — 77030040361 HC SLV COMPR DVT MDII -B

## 2020-08-19 PROCEDURE — 74011000258 HC RX REV CODE- 258: Performed by: INTERNAL MEDICINE

## 2020-08-19 PROCEDURE — 74011636637 HC RX REV CODE- 636/637: Performed by: INTERNAL MEDICINE

## 2020-08-19 PROCEDURE — 80048 BASIC METABOLIC PNL TOTAL CA: CPT

## 2020-08-19 RX ORDER — VANCOMYCIN HYDROCHLORIDE
1250 EVERY 24 HOURS
Status: DISCONTINUED | OUTPATIENT
Start: 2020-08-19 | End: 2020-08-21 | Stop reason: HOSPADM

## 2020-08-19 RX ORDER — HYDROCORTISONE ACETATE 25 MG/1
25 SUPPOSITORY RECTAL EVERY 12 HOURS
Status: DISCONTINUED | OUTPATIENT
Start: 2020-08-19 | End: 2020-08-21 | Stop reason: HOSPADM

## 2020-08-19 RX ADMIN — Medication 5 ML: at 05:03

## 2020-08-19 RX ADMIN — ACETAMINOPHEN 650 MG: 325 TABLET, FILM COATED ORAL at 21:06

## 2020-08-19 RX ADMIN — Medication 5 ML: at 21:04

## 2020-08-19 RX ADMIN — Medication 10 ML: at 15:42

## 2020-08-19 RX ADMIN — PIPERACILLIN SODIUM AND TAZOBACTAM SODIUM 3.38 G: 3; .375 INJECTION, POWDER, LYOPHILIZED, FOR SOLUTION INTRAVENOUS at 21:00

## 2020-08-19 RX ADMIN — ACETAMINOPHEN 650 MG: 325 TABLET, FILM COATED ORAL at 15:38

## 2020-08-19 RX ADMIN — HYDROCORTISONE ACETATE 25 MG: 25 SUPPOSITORY RECTAL at 12:52

## 2020-08-19 RX ADMIN — LATANOPROST 1 DROP: 50 SOLUTION OPHTHALMIC at 17:48

## 2020-08-19 RX ADMIN — ASPIRIN 81 MG: 81 TABLET, COATED ORAL at 08:29

## 2020-08-19 RX ADMIN — BUDESONIDE AND FORMOTEROL FUMARATE DIHYDRATE 2 PUFF: 160; 4.5 AEROSOL RESPIRATORY (INHALATION) at 20:59

## 2020-08-19 RX ADMIN — PIPERACILLIN SODIUM AND TAZOBACTAM SODIUM 3.38 G: 3; .375 INJECTION, POWDER, LYOPHILIZED, FOR SOLUTION INTRAVENOUS at 12:46

## 2020-08-19 RX ADMIN — BUDESONIDE AND FORMOTEROL FUMARATE DIHYDRATE 2 PUFF: 160; 4.5 AEROSOL RESPIRATORY (INHALATION) at 08:37

## 2020-08-19 RX ADMIN — Medication 1 AMPULE: at 21:02

## 2020-08-19 RX ADMIN — BRIMONIDINE TARTRATE 1 DROP: 2 SOLUTION OPHTHALMIC at 21:03

## 2020-08-19 RX ADMIN — Medication 1 AMPULE: at 08:29

## 2020-08-19 RX ADMIN — PREDNISONE 5 MG: 10 TABLET ORAL at 08:29

## 2020-08-19 RX ADMIN — PIPERACILLIN SODIUM AND TAZOBACTAM SODIUM 3.38 G: 3; .375 INJECTION, POWDER, LYOPHILIZED, FOR SOLUTION INTRAVENOUS at 04:31

## 2020-08-19 RX ADMIN — VANCOMYCIN HYDROCHLORIDE 1250 MG: 10 INJECTION, POWDER, LYOPHILIZED, FOR SOLUTION INTRAVENOUS at 17:51

## 2020-08-19 NOTE — ED NOTES
Report received from Shai Jasso, [facility] at this time.

## 2020-08-19 NOTE — PROGRESS NOTES
TRANSFER - IN REPORT:    Verbal report received from Marii Kenny on Dania Held  being received from ED for routine progression of care      Report consisted of patients Situation, Background, Assessment and   Recommendations(SBAR). Information from the following report(s) SBAR was reviewed with the receiving nurse. Opportunity for questions and clarification was provided. Assessment completed upon patients arrival to unit and care assumed.

## 2020-08-19 NOTE — INTERDISCIPLINARY ROUNDS
Interdisciplinary team rounds were held 8/19/2020 with the following team members:Care Management, Nursing and Physician. Plan of care discussed. See clinical pathway and/or care plan for interventions and desired outcomes.

## 2020-08-19 NOTE — PROGRESS NOTES
Progress Note    Patient: Sue Stapleton MRN: [id number]  SSN: [id number]    YOB: 1940  Age: 79 y.o. Sex: male      Admit Date: 8/18/2020    LOS: 1 day     Subjective:   78-year-old male with past medical history of AAA, coronary artery disease, Heart failure with preserved ejection fraction, CKD stage III, COPD, hyperlipidemia, hypertension, presents in the setting of confusion associated with right lower extremity redness and pain. Patient seen and examined at bedside. States still having some RLE pian and redness, but improved from yesterday. Denies any chest pain, no SOB, no abdominal pain.      Objective:     Vitals:    08/18/20 2036 08/18/20 2046 08/19/20 0846 08/19/20 1218   BP: 99/54 100/52 117/65 128/77   Pulse: 75 74 69 70   Resp: 18 16 18 20   Temp:  98.9 °F (37.2 °C) 98.6 °F (37 °C) 97.7 °F (36.5 °C)   SpO2: 95% 95% 97% 97%   Weight:       Height:            Intake and Output:  Current Shift: 08/19 0701 - 08/19 1900  In: -   Out: 250 [Urine:250]  Last three shifts: 08/17 1901 - 08/19 0700  In: -   Out: 550 [Urine:550]    ROS  10 ROS negative except from stated on subjective    Physical Exam:   General: Alert, mildly confused, NAD  HEENT: NC/AT, EOM are intact  Neck: supple, no JVD  Cardiovascular: RRR, S1, S2, no murmurs  Respiratory: Lungs are clear, no wheezes or rales  Abdomen: Soft, NT, ND  Back: No CVA tenderness, no paraspinal tenderness  Extremities: RLE edema and eeryhtema  Neuro: CN are intact, no focal deficits  Skin: no rash or ulcers  Psych: good mood and affect    Lab/Data Review:  I have personally reviewed patients laboratory data showing  Recent Results (from the past 24 hour(s))   EKG, 12 LEAD, INITIAL    Collection Time: 08/18/20  3:26 PM   Result Value Ref Range    Ventricular Rate 101 BPM    Atrial Rate 101 BPM    P-R Interval 272 ms    QRS Duration 174 ms    Q-T Interval 384 ms    QTC Calculation (Bezet) 497 ms    Calculated P Axis 89 degrees    Calculated R Axis -79 degrees    Calculated T Axis 94 degrees    Diagnosis       !! AGE AND GENDER SPECIFIC ECG ANALYSIS !! Sinus tachycardia with 1st degree A-V block  ventricular paced  Possible Lateral infarct , age undetermined  Cannot rule out Inferior infarct (masked by fascicular block?) , age   undetermined  Abnormal ECG  When compared with ECG of 14-FEB-2019 07:25,  No significant change was found    Confirmed by CHUCKIE FOREMAN (), Naresh Reeves ([id number]) on 8/18/2020 5:58:35 PM     CBC WITH AUTOMATED DIFF    Collection Time: 08/18/20  3:36 PM   Result Value Ref Range    WBC 28.7 (H) 4.3 - 11.1 K/uL    RBC 4.31 4.23 - 5.6 M/uL    HGB 14.0 13.6 - 17.2 g/dL    HCT 42.0 41.1 - 50.3 %    MCV 97.4 79.6 - 97.8 FL    MCH 32.5 26.1 - 32.9 PG    MCHC 33.3 31.4 - 35.0 g/dL    RDW 12.7 11.9 - 14.6 %    PLATELET 088 (L) 336 - 450 K/uL    MPV 11.2 9.4 - 12.3 FL    ABSOLUTE NRBC 0.00 0.0 - 0.2 K/uL    DF AUTOMATED      NEUTROPHILS 80 (H) 43 - 78 %    LYMPHOCYTES 6 (L) 13 - 44 %    MONOCYTES 10 4.0 - 12.0 %    EOSINOPHILS 0 (L) 0.5 - 7.8 %    BASOPHILS 0 0.0 - 2.0 %    IMMATURE GRANULOCYTES 3 0.0 - 5.0 %    ABS. NEUTROPHILS 22.9 (H) 1.7 - 8.2 K/UL    ABS. LYMPHOCYTES 1.7 0.5 - 4.6 K/UL    ABS. MONOCYTES 3.0 (H) 0.1 - 1.3 K/UL    ABS. EOSINOPHILS 0.0 0.0 - 0.8 K/UL    ABS. BASOPHILS 0.1 0.0 - 0.2 K/UL    ABS. IMM. GRANS. 0.9 (H) 0.0 - 0.5 K/UL   METABOLIC PANEL, COMPREHENSIVE    Collection Time: 08/18/20  3:36 PM   Result Value Ref Range    Sodium 139 136 - 145 mmol/L    Potassium 4.0 3.5 - 5.1 mmol/L    Chloride 107 98 - 107 mmol/L    CO2 24 21 - 32 mmol/L    Anion gap 8 7 - 16 mmol/L    Glucose 113 (H) 65 - 100 mg/dL    BUN 31 (H) 8 - 23 MG/DL    Creatinine 2.59 (H) 0.8 - 1.5 MG/DL    GFR est AA 31 (L) >60 ml/min/1.73m2    GFR est non-AA 26 (L) >60 ml/min/1.73m2    Calcium 8.3 8.3 - 10.4 MG/DL    Bilirubin, total 0.9 0.2 - 1.1 MG/DL    ALT (SGPT) 14 12 - 65 U/L    AST (SGOT) 18 15 - 37 U/L    Alk.  phosphatase 84 50 - 136 U/L    Protein, total 7.0 6.3 - 8.2 g/dL    Albumin 3.3 3.2 - 4.6 g/dL    Globulin 3.7 (H) 2.3 - 3.5 g/dL    A-G Ratio 0.9 (L) 1.2 - 3.5     TROPONIN-HIGH SENSITIVITY    Collection Time: 08/18/20  3:36 PM   Result Value Ref Range    Troponin-High Sensitivity 233.6 (HH) 0 - 14 pg/mL   LACTIC ACID    Collection Time: 08/18/20  4:07 PM   Result Value Ref Range    Lactic acid 2.0 0.4 - 2.0 MMOL/L   CULTURE, BLOOD    Collection Time: 08/18/20  4:07 PM    Specimen: Blood   Result Value Ref Range    Special Requests: LEFT  Antecubital        Culture result: NO GROWTH AFTER 17 HOURS     CULTURE, BLOOD    Collection Time: 08/18/20  4:08 PM    Specimen: Blood   Result Value Ref Range    Special Requests: LEFT  HAND        Culture result: NO GROWTH AFTER 17 HOURS     URINE MICROSCOPIC    Collection Time: 08/18/20  5:04 PM   Result Value Ref Range    WBC 10-20 0 /hpf    RBC 0 0 /hpf    Epithelial cells 0 0 /hpf    Bacteria TRACE 0 /hpf    Casts 0-3 0 /lpf    Crystals, urine 0 0 /LPF    Mucus 0 0 /lpf    Other observations RESULTS VERIFIED MANUALLY     METABOLIC PANEL, BASIC    Collection Time: 08/19/20  5:26 AM   Result Value Ref Range    Sodium 142 136 - 145 mmol/L    Potassium 4.2 3.5 - 5.1 mmol/L    Chloride 112 (H) 98 - 107 mmol/L    CO2 24 21 - 32 mmol/L    Anion gap 6 (L) 7 - 16 mmol/L    Glucose 87 65 - 100 mg/dL    BUN 32 (H) 8 - 23 MG/DL    Creatinine 2.49 (H) 0.8 - 1.5 MG/DL    GFR est AA 32 (L) >60 ml/min/1.73m2    GFR est non-AA 27 (L) >60 ml/min/1.73m2    Calcium 8.2 (L) 8.3 - 10.4 MG/DL   CBC W/O DIFF    Collection Time: 08/19/20  5:26 AM   Result Value Ref Range    WBC 19.4 (H) 4.3 - 11.1 K/uL    RBC 4.09 (L) 4.23 - 5.6 M/uL    HGB 12.8 (L) 13.6 - 17.2 g/dL    HCT 40.9 (L) 41.1 - 50.3 %    .0 (H) 79.6 - 97.8 FL    MCH 31.3 26.1 - 32.9 PG    MCHC 31.3 (L) 31.4 - 35.0 g/dL    RDW 12.9 11.9 - 14.6 %    PLATELET 533 (L) 533 - 450 K/uL    MPV 11.5 9.4 - 12.3 FL    ABSOLUTE NRBC 0.00 0.0 - 0.2 K/uL   VANCOMYCIN, RANDOM    Collection Time: 08/19/20 12:16 PM   Result Value Ref Range    Vancomycin, random 10.5 UG/ML        Image:  I have personally reviewed patients imaging showing  CT HEAD WO CONT   Final Result   IMPRESSION:   No acute intracranial abnormality. Chronic changes as described. XR CHEST PORT   Final Result   IMPRESSION:      1. Probable mild volume overload with interstitial pulmonary edema and small   bilateral pleural effusions. 2. Similar appearance of an irregular nodular opacity in the right upper lobe,   recently evaluated with PET/CT. Hospital problems     Principal Problem:    Sepsis ([facility] [address] (8/18/2020)    Active Problems:    Coronary artery disease involving native coronary artery of native heart without angina pectoris (1/4/2019)      Overview: 01/2019:  Stable CA\D with Patent LIMA-LAD, SVG-RI, SVG-OM. COPD (chronic obstructive pulmonary disease) ([facility] [address] (1/4/2019)      Hypertension (2/7/2019)      Overview: was taken off BP meds when septic in 10/2018 and hasn't taken since       AAA (abdominal aortic aneurysm) without rupture ([facility] [address] (2/12/2019)      Overview: 02/2019:  5.5cm      High cholesterol (2/12/2019)      Overview: no meds        Assessment and Plan:   69-year-old male with past medical history of AAA, coronary artery disease, Heart failure with preserved ejection fraction, CKD stage III, COPD, hyperlipidemia, hypertension, presents in the setting of confusion associated with right lower extremity redness and pain.      1. Sepsis likely in setting of right lower extremity cellulitis  - Continue Zosyn   - Continue vancomycin  - Follow BCx  - Monitor for improvement of erythema     2. Heart failure with preserved ejection fraction.    - Continue to hold lasix for now due to sepsis     3. COPD on oxygen at home at 2 L baseline. Currently not on exacerbation.   - Continue Symbicort  - Albuterol as needed  - Oxygen therapy to maintain an oxygen saturation more than 92%     4.   CKD stage III  - Avoid nephrotoxic agents  - Monitor renal function closely     5. Hypertension.   - Continue to hold Lasix and lisinopril for now due to sepsis     DVT prophylaxis with heparin subcu     I have reviewed, updated, and verified this note's content and spent 38 minutes of my 42 minutes visit performing counseling and coordination of care regarding medical management.      Signed By: Lovett Lennox, MD     August 19, 2020

## 2020-08-19 NOTE — ROUTINE PROCESS
Shift assessment complete. Respirations present. Even and unlabored. No s/s of distress. Zero c/o pain at this time. Call light within reach. Encouraged patient to call for assistance. Patient verbalizes understanding. See Doc Flowsheet for assessment details. Patient resting in bed. Bed alarm in progress. Door open for observation. SCDs on bilaterally. Room air noted. Right lower extremity with redness . Denies any complaints.

## 2020-08-19 NOTE — PROGRESS NOTES
MSN, CM:  Reviewed chart and all below information was gathered. Patient lives with wife Jae Bajwa. Patient requires home oxygen with [employer]. Patient requires the use of a walker for ambulation. Several attempts have been make to contact patient and emergency contact with no success. PT and OT consulted for evaluation. Case Management will continue to follow for discharge needs. Care Management Interventions  PCP Verified by CM: (Dr. Keshav Ramirez)  Mode of Transport at Discharge:  Other (see comment)(family to transport)  Transition of Care Consult (CM Consult): Discharge Planning  Physical Therapy Consult: Yes  Occupational Therapy Consult: Yes  Current Support Network: Lives with Spouse  Freedom of Choice List was Provided with Basic Dialogue that Supports the Patient's Individualized Plan of Care/Goals, Treatment Preferences and Shares the Quality Data Associated with the Providers?: Yes  Discharge Location  Discharge Placement: Home

## 2020-08-19 NOTE — PROGRESS NOTES
Pharmacokinetic Consult to Pharmacist    Susan Mccarty is a 79 y.o. male being treated with Vancomycin. Height: 5' 10\" (177.8 cm)  Weight: 81.6 kg (179 lb 14.3 oz)  Lab Results   Component Value Date/Time    BUN 32 (H) 08/19/2020 05:26 AM    Creatinine 2.49 (H) 08/19/2020 05:26 AM    WBC 19.4 (H) 08/19/2020 05:26 AM    Lactic acid 2.0 08/18/2020 04:07 PM      Estimated Creatinine Clearance: 24.8 mL/min (A) (based on SCr of 2.49 mg/dL (H)). Lab Results   Component Value Date/Time    Vancomycin, random 10.5 08/19/2020 12:16 PM       Day 2 of vancomycin. Goal trough is 10-20. Patient with subtherapeutic true trough. Will schedule doses q24h and monitor renal function. Will continue to follow patient and order levels when clinically indicated.     Thanks,   Angi Lee, PharmD  PGY1 Pharmacy Resident  [phone]    8/19/2020  2:16 PM

## 2020-08-19 NOTE — ED NOTES
TRANSFER - OUT REPORT:    Verbal report given to Terrie Mccarthy RN (name) on Theodore Peer  being transferred to (unit) for routine progression of care       Report consisted of patients Situation, Background, Assessment and   Recommendations(SBAR). Information from the following report(s) SBAR, Kardex, Intake/Output, MAR and Recent Results was reviewed with the receiving nurse. Lines:   Peripheral IV 08/18/20 Left Antecubital (Active)        Opportunity for questions and clarification was provided.       Patient transported with:   [id number]

## 2020-08-19 NOTE — PROGRESS NOTES
Unable to reconcile home meds due to patient being uncertain of name/dosage. Called the patients wife on the home and cell number listed in chart, there was no answer.

## 2020-08-19 NOTE — PROGRESS NOTES
Physician Progress Note      PATIENTDajoseph Shelley  CSN #:                  [id number]  :                       1940  ADMIT DATE:       2020 3:27 PM  [address] [place] DATE:  RESPONDING  PROVIDER #:        Luis Almodovar MD [address] [facility]          QUERY TEXT:    Pt admitted with Sepsis . Patient noted to have confusion, seeing things . Please document in your progress  notes and the discharge summary if your are treating any of the following. The medical record reflects the following:  Risk Factors: 78 yo with sepsis, rle cellulitis, possible UTI, hypotension    Clinical Indicators:  Family reported patient has been seeing things and has had worsening confusion for several days. Head Ct report  \"No acute intracranial abnormality. Chronic changes as described. \"    Treatment: lab workup, safety measures. Thank you. Maya Perez RN, BSN, CDS  Clinical Documentation Management Program  [facility] AT [address]  [phone]  [email or web address]  Options provided:  -- Metabolic encephalopathy  -- Septic encephalopathy  -- Toxic encephalopathy  -- Dementia with behavioral disturbance  -- Encephalopathy due to medications or drugs, Please specify  -- Delirium, Please specify cause  -- Other - I will add my own diagnosis  -- Disagree - Not applicable / Not valid  -- Disagree - Clinically unable to determine / Unknown  -- Refer to Clinical Documentation Reviewer    PROVIDER RESPONSE TEXT:    This patient has septic encephalopathy.     Query created by: Antonio Logan on 2020 1:19 PM      Electronically signed by:  Luis Staton [facility] 2020 2:45 PM

## 2020-08-20 LAB
ACC. NO. FROM MICRO ORDER, ACCP: ABNORMAL
ANION GAP SERPL CALC-SCNC: 6 MMOL/L (ref 7–16)
BACTERIA SPEC CULT: ABNORMAL
BASOPHILS # BLD: 0.1 K/UL (ref 0–0.2)
BASOPHILS NFR BLD: 1 % (ref 0–2)
BUN SERPL-MCNC: 30 MG/DL (ref 8–23)
CALCIUM SERPL-MCNC: 8 MG/DL (ref 8.3–10.4)
CHLORIDE SERPL-SCNC: 117 MMOL/L (ref 98–107)
CO2 SERPL-SCNC: 23 MMOL/L (ref 21–32)
CREAT SERPL-MCNC: 2.38 MG/DL (ref 0.8–1.5)
DIFFERENTIAL METHOD BLD: ABNORMAL
EOSINOPHIL # BLD: 0.5 K/UL (ref 0–0.8)
EOSINOPHIL NFR BLD: 4 % (ref 0.5–7.8)
ERYTHROCYTE [DISTWIDTH] IN BLOOD BY AUTOMATED COUNT: 13 % (ref 11.9–14.6)
GLUCOSE SERPL-MCNC: 106 MG/DL (ref 65–100)
GRAM STN SPEC: ABNORMAL
HCT VFR BLD AUTO: 37.9 % (ref 41.1–50.3)
HGB BLD-MCNC: 12.4 G/DL (ref 13.6–17.2)
IMM GRANULOCYTES # BLD AUTO: 0.1 K/UL (ref 0–0.5)
IMM GRANULOCYTES NFR BLD AUTO: 1 % (ref 0–5)
INTERPRETATION: ABNORMAL
LYMPHOCYTES # BLD: 2.4 K/UL (ref 0.5–4.6)
LYMPHOCYTES NFR BLD: 18 % (ref 13–44)
MCH RBC QN AUTO: 32.4 PG (ref 26.1–32.9)
MCHC RBC AUTO-ENTMCNC: 32.7 G/DL (ref 31.4–35)
MCV RBC AUTO: 99 FL (ref 79.6–97.8)
MECA (METHICILLIN-RESISTANCE GENES), MRGP: NOT DETECTED
MONOCYTES # BLD: 1.5 K/UL (ref 0.1–1.3)
MONOCYTES NFR BLD: 11 % (ref 4–12)
NEUTS SEG # BLD: 8.5 K/UL (ref 1.7–8.2)
NEUTS SEG NFR BLD: 65 % (ref 43–78)
NRBC # BLD: 0 K/UL (ref 0–0.2)
PLATELET # BLD AUTO: 132 K/UL (ref 150–450)
PMV BLD AUTO: 11.7 FL (ref 9.4–12.3)
POTASSIUM SERPL-SCNC: 3.7 MMOL/L (ref 3.5–5.1)
RBC # BLD AUTO: 3.83 M/UL (ref 4.23–5.6)
SERVICE CMNT-IMP: ABNORMAL
SODIUM SERPL-SCNC: 146 MMOL/L (ref 136–145)
STAPHYLOCOCCUS, STAPP: DETECTED
WBC # BLD AUTO: 13 K/UL (ref 4.3–11.1)

## 2020-08-20 PROCEDURE — 80048 BASIC METABOLIC PNL TOTAL CA: CPT

## 2020-08-20 PROCEDURE — C8924 2D TTE W OR W/O FOL W/CON,FU: HCPCS

## 2020-08-20 PROCEDURE — 74011250636 HC RX REV CODE- 250/636: Performed by: INTERNAL MEDICINE

## 2020-08-20 PROCEDURE — 65270000029 HC RM PRIVATE

## 2020-08-20 PROCEDURE — 94760 N-INVAS EAR/PLS OXIMETRY 1: CPT

## 2020-08-20 PROCEDURE — 87040 BLOOD CULTURE FOR BACTERIA: CPT

## 2020-08-20 PROCEDURE — 36415 COLL VENOUS BLD VENIPUNCTURE: CPT

## 2020-08-20 PROCEDURE — 74011636637 HC RX REV CODE- 636/637: Performed by: INTERNAL MEDICINE

## 2020-08-20 PROCEDURE — 74011250637 HC RX REV CODE- 250/637: Performed by: INTERNAL MEDICINE

## 2020-08-20 PROCEDURE — 74011000250 HC RX REV CODE- 250: Performed by: INTERNAL MEDICINE

## 2020-08-20 PROCEDURE — 94640 AIRWAY INHALATION TREATMENT: CPT

## 2020-08-20 PROCEDURE — 74011000258 HC RX REV CODE- 258: Performed by: INTERNAL MEDICINE

## 2020-08-20 PROCEDURE — 85025 COMPLETE CBC W/AUTO DIFF WBC: CPT

## 2020-08-20 RX ORDER — FUROSEMIDE 40 MG/1
40 TABLET ORAL DAILY
Status: DISCONTINUED | OUTPATIENT
Start: 2020-08-21 | End: 2020-08-21 | Stop reason: HOSPADM

## 2020-08-20 RX ADMIN — Medication 1 AMPULE: at 20:14

## 2020-08-20 RX ADMIN — BRIMONIDINE TARTRATE 1 DROP: 2 SOLUTION OPHTHALMIC at 22:00

## 2020-08-20 RX ADMIN — Medication 10 ML: at 21:14

## 2020-08-20 RX ADMIN — Medication 1 AMPULE: at 08:42

## 2020-08-20 RX ADMIN — PIPERACILLIN SODIUM AND TAZOBACTAM SODIUM 3.38 G: 3; .375 INJECTION, POWDER, LYOPHILIZED, FOR SOLUTION INTRAVENOUS at 04:30

## 2020-08-20 RX ADMIN — PIPERACILLIN SODIUM AND TAZOBACTAM SODIUM 3.38 G: 3; .375 INJECTION, POWDER, LYOPHILIZED, FOR SOLUTION INTRAVENOUS at 20:15

## 2020-08-20 RX ADMIN — PIPERACILLIN SODIUM AND TAZOBACTAM SODIUM 3.38 G: 3; .375 INJECTION, POWDER, LYOPHILIZED, FOR SOLUTION INTRAVENOUS at 13:44

## 2020-08-20 RX ADMIN — VANCOMYCIN HYDROCHLORIDE 1250 MG: 10 INJECTION, POWDER, LYOPHILIZED, FOR SOLUTION INTRAVENOUS at 17:00

## 2020-08-20 RX ADMIN — Medication 10 ML: at 13:30

## 2020-08-20 RX ADMIN — PREDNISONE 5 MG: 10 TABLET ORAL at 08:41

## 2020-08-20 RX ADMIN — ONDANSETRON 4 MG: 2 INJECTION INTRAMUSCULAR; INTRAVENOUS at 23:04

## 2020-08-20 RX ADMIN — PERFLUTREN 1 ML: 6.52 INJECTION, SUSPENSION INTRAVENOUS at 10:00

## 2020-08-20 RX ADMIN — BUDESONIDE AND FORMOTEROL FUMARATE DIHYDRATE 2 PUFF: 160; 4.5 AEROSOL RESPIRATORY (INHALATION) at 21:07

## 2020-08-20 RX ADMIN — Medication 5 ML: at 05:14

## 2020-08-20 RX ADMIN — BUDESONIDE AND FORMOTEROL FUMARATE DIHYDRATE 2 PUFF: 160; 4.5 AEROSOL RESPIRATORY (INHALATION) at 09:07

## 2020-08-20 RX ADMIN — ASPIRIN 81 MG: 81 TABLET, COATED ORAL at 08:41

## 2020-08-20 RX ADMIN — LATANOPROST 1 DROP: 50 SOLUTION OPHTHALMIC at 17:00

## 2020-08-20 NOTE — PROGRESS NOTES
Progress Note    Patient: Dorys Rodriguez MRN: [id number]  SSN: [id number]    YOB: 1940  Age: 79 y.o. Sex: male      Admit Date: 8/18/2020    LOS: 2 days     Subjective:   71-year-old male with past medical history of AAA, coronary artery disease, Heart failure with preserved ejection fraction, CKD stage III, COPD, hyperlipidemia, hypertension, presents in the setting of confusion associated with right lower extremity redness and pain. Patient seen and examined at bedside. States still having some RLE pian and redness, but improved from yesterday. Denies any chest pain, no SOB, no abdominal pain.      Objective:     Vitals:    08/20/20 0433 08/20/20 0833 08/20/20 0909 08/20/20 1141   BP: 144/73 147/85  160/75   Pulse: 70 84  76   Resp: 18 18 18   Temp: 97.6 °F (36.4 °C) 98 °F (36.7 °C)  98.4 °F (36.9 °C)   SpO2: 96% 98% 98% 98%   Weight:       Height:            Intake and Output:  Current Shift: 08/20 0701 - 08/20 1900  In: -   Out: 250 [Urine:250]  Last three shifts: 08/18 1901 - 08/20 0700  In: 480 [P.O.:480]  Out: 1400 [Urine:1400]    ROS  10 ROS negative except from stated on subjective    Physical Exam:   General: Alert, mildly confused, NAD  HEENT: NC/AT, EOM are intact  Neck: supple, no JVD  Cardiovascular: RRR, S1, S2, no murmurs  Respiratory: Lungs are clear, no wheezes or rales  Abdomen: Soft, NT, ND  Back: No CVA tenderness, no paraspinal tenderness  Extremities: RLE edema and eeryhtema  Neuro: CN are intact, no focal deficits  Skin: no rash or ulcers  Psych: good mood and affect    Lab/Data Review:  I have personally reviewed patients laboratory data showing  Recent Results (from the past 24 hour(s))   CBC WITH AUTOMATED DIFF    Collection Time: 08/20/20  5:34 AM   Result Value Ref Range    WBC 13.0 (H) 4.3 - 11.1 K/uL    RBC 3.83 (L) 4.23 - 5.6 M/uL    HGB 12.4 (L) 13.6 - 17.2 g/dL    HCT 37.9 (L) 41.1 - 50.3 %    MCV 99.0 (H) 79.6 - 97.8 FL    MCH 32.4 26.1 - 32.9 PG    MCHC 32.7 31.4 - 35.0 g/dL    RDW 13.0 11.9 - 14.6 %    PLATELET 213 (L) 794 - 450 K/uL    MPV 11.7 9.4 - 12.3 FL    ABSOLUTE NRBC 0.00 0.0 - 0.2 K/uL    DF AUTOMATED      NEUTROPHILS 65 43 - 78 %    LYMPHOCYTES 18 13 - 44 %    MONOCYTES 11 4.0 - 12.0 %    EOSINOPHILS 4 0.5 - 7.8 %    BASOPHILS 1 0.0 - 2.0 %    IMMATURE GRANULOCYTES 1 0.0 - 5.0 %    ABS. NEUTROPHILS 8.5 (H) 1.7 - 8.2 K/UL    ABS. LYMPHOCYTES 2.4 0.5 - 4.6 K/UL    ABS. MONOCYTES 1.5 (H) 0.1 - 1.3 K/UL    ABS. EOSINOPHILS 0.5 0.0 - 0.8 K/UL    ABS. BASOPHILS 0.1 0.0 - 0.2 K/UL    ABS. IMM. GRANS. 0.1 0.0 - 0.5 K/UL   METABOLIC PANEL, BASIC    Collection Time: 08/20/20  5:34 AM   Result Value Ref Range    Sodium 146 (H) 136 - 145 mmol/L    Potassium 3.7 3.5 - 5.1 mmol/L    Chloride 117 (H) 98 - 107 mmol/L    CO2 23 21 - 32 mmol/L    Anion gap 6 (L) 7 - 16 mmol/L    Glucose 106 (H) 65 - 100 mg/dL    BUN 30 (H) 8 - 23 MG/DL    Creatinine 2.38 (H) 0.8 - 1.5 MG/DL    GFR est AA 34 (L) >60 ml/min/1.73m2    GFR est non-AA 28 (L) >60 ml/min/1.73m2    Calcium 8.0 (L) 8.3 - 10.4 MG/DL        Image:  I have personally reviewed patients imaging showing  CT HEAD WO CONT   Final Result   IMPRESSION:   No acute intracranial abnormality. Chronic changes as described. XR CHEST PORT   Final Result   IMPRESSION:      1. Probable mild volume overload with interstitial pulmonary edema and small   bilateral pleural effusions. 2. Similar appearance of an irregular nodular opacity in the right upper lobe,   recently evaluated with PET/CT. Hospital problems     Principal Problem:    Sepsis ([address] (8/18/2020)    Active Problems:    Coronary artery disease involving native coronary artery of native heart without angina pectoris (1/4/2019)      Overview: 01/2019:  Stable CA\D with Patent LIMA-LAD, SVG-RI, SVG-OM.        COPD (chronic obstructive pulmonary disease) ([facility] (1/4/2019)      Hypertension (2/7/2019)      Overview: was taken off BP meds when septic in 10/2018 and hasn't taken since       AAA (abdominal aortic aneurysm) without rupture ([facility] [address] (2/12/2019)      Overview: 02/2019:  5.5cm      High cholesterol (2/12/2019)      Overview: no meds        Assessment and Plan:   76-year-old male with past medical history of AAA, coronary artery disease, Heart failure with preserved ejection fraction, CKD stage III, COPD, hyperlipidemia, hypertension, presents in the setting of confusion associated with right lower extremity redness and pain.      1. Sepsis likely in setting of right lower extremity cellulitis  - Blood culture positive for staph coagulase negative likely contaminant  - Repeat BCxs today  - TTE   - Continue Zosyn   - Continue vancomycin  - Follow BCx  - Monitor for improvement of erythema     2. Heart failure with preserved ejection fraction  - Resume lasix      3. COPD on oxygen at home at 2 L baseline. Currently not on exacerbation.   - Continue Symbicort  - Albuterol as needed  - Oxygen therapy to maintain an oxygen saturation more than 92%     4. CKD stage III  - Avoid nephrotoxic agents  - Monitor renal function closely     5. Hypertension.   - Resume Lasix  - Continue to hold lisinopril for now     DVT prophylaxis with heparin subcu     I have reviewed, updated, and verified this note's content and spent 37 minutes of my 41 minutes visit performing counseling and coordination of care regarding medical management.      Signed By: Lovett Lennox, MD     August 20, 2020

## 2020-08-20 NOTE — PROGRESS NOTES
Pt. Resting in chair and denies pain at this time. Pt. Asking when he can go home. Blood cx positive and new set ordered. Will continue IV abx.

## 2020-08-21 VITALS
HEART RATE: 72 BPM | HEIGHT: 70 IN | TEMPERATURE: 98.7 F | DIASTOLIC BLOOD PRESSURE: 87 MMHG | BODY MASS INDEX: 25.75 KG/M2 | WEIGHT: 179.9 LBS | OXYGEN SATURATION: 97 % | SYSTOLIC BLOOD PRESSURE: 152 MMHG | RESPIRATION RATE: 18 BRPM

## 2020-08-21 LAB
ANION GAP SERPL CALC-SCNC: 8 MMOL/L (ref 7–16)
BASOPHILS # BLD: 0.1 K/UL (ref 0–0.2)
BASOPHILS NFR BLD: 1 % (ref 0–2)
BUN SERPL-MCNC: 21 MG/DL (ref 8–23)
CALCIUM SERPL-MCNC: 8.3 MG/DL (ref 8.3–10.4)
CHLORIDE SERPL-SCNC: 116 MMOL/L (ref 98–107)
CO2 SERPL-SCNC: 20 MMOL/L (ref 21–32)
CREAT SERPL-MCNC: 2.22 MG/DL (ref 0.8–1.5)
DIFFERENTIAL METHOD BLD: ABNORMAL
EOSINOPHIL # BLD: 0.4 K/UL (ref 0–0.8)
EOSINOPHIL NFR BLD: 3 % (ref 0.5–7.8)
ERYTHROCYTE [DISTWIDTH] IN BLOOD BY AUTOMATED COUNT: 13 % (ref 11.9–14.6)
GLUCOSE SERPL-MCNC: 122 MG/DL (ref 65–100)
HCT VFR BLD AUTO: 39.7 % (ref 41.1–50.3)
HGB BLD-MCNC: 13.1 G/DL (ref 13.6–17.2)
IMM GRANULOCYTES # BLD AUTO: 0.1 K/UL (ref 0–0.5)
IMM GRANULOCYTES NFR BLD AUTO: 1 % (ref 0–5)
LYMPHOCYTES # BLD: 2.1 K/UL (ref 0.5–4.6)
LYMPHOCYTES NFR BLD: 16 % (ref 13–44)
MCH RBC QN AUTO: 32.5 PG (ref 26.1–32.9)
MCHC RBC AUTO-ENTMCNC: 33 G/DL (ref 31.4–35)
MCV RBC AUTO: 98.5 FL (ref 79.6–97.8)
MONOCYTES # BLD: 1 K/UL (ref 0.1–1.3)
MONOCYTES NFR BLD: 7 % (ref 4–12)
NEUTS SEG # BLD: 9.9 K/UL (ref 1.7–8.2)
NEUTS SEG NFR BLD: 73 % (ref 43–78)
NRBC # BLD: 0 K/UL (ref 0–0.2)
PLATELET # BLD AUTO: 149 K/UL (ref 150–450)
PMV BLD AUTO: 11 FL (ref 9.4–12.3)
POTASSIUM SERPL-SCNC: 3.9 MMOL/L (ref 3.5–5.1)
RBC # BLD AUTO: 4.03 M/UL (ref 4.23–5.6)
SODIUM SERPL-SCNC: 144 MMOL/L (ref 136–145)
WBC # BLD AUTO: 13.5 K/UL (ref 4.3–11.1)

## 2020-08-21 PROCEDURE — 74011250637 HC RX REV CODE- 250/637: Performed by: INTERNAL MEDICINE

## 2020-08-21 PROCEDURE — 36415 COLL VENOUS BLD VENIPUNCTURE: CPT

## 2020-08-21 PROCEDURE — 74011000258 HC RX REV CODE- 258: Performed by: INTERNAL MEDICINE

## 2020-08-21 PROCEDURE — 74011250636 HC RX REV CODE- 250/636: Performed by: INTERNAL MEDICINE

## 2020-08-21 PROCEDURE — 94760 N-INVAS EAR/PLS OXIMETRY 1: CPT

## 2020-08-21 PROCEDURE — 74011636637 HC RX REV CODE- 636/637: Performed by: INTERNAL MEDICINE

## 2020-08-21 PROCEDURE — 80048 BASIC METABOLIC PNL TOTAL CA: CPT

## 2020-08-21 PROCEDURE — 85025 COMPLETE CBC W/AUTO DIFF WBC: CPT

## 2020-08-21 RX ORDER — DOXYCYCLINE 100 MG/1
100 TABLET ORAL 2 TIMES DAILY
Qty: 10 TAB | Refills: 0 | Status: SHIPPED | OUTPATIENT
Start: 2020-08-21 | End: 2020-08-26

## 2020-08-21 RX ORDER — CEPHALEXIN 250 MG/1
250 CAPSULE ORAL 2 TIMES DAILY
Qty: 10 CAP | Refills: 0 | Status: SHIPPED | OUTPATIENT
Start: 2020-08-21 | End: 2020-08-26

## 2020-08-21 RX ADMIN — PREDNISONE 5 MG: 10 TABLET ORAL at 08:11

## 2020-08-21 RX ADMIN — FUROSEMIDE 40 MG: 40 TABLET ORAL at 08:11

## 2020-08-21 RX ADMIN — PIPERACILLIN SODIUM AND TAZOBACTAM SODIUM 3.38 G: 3; .375 INJECTION, POWDER, LYOPHILIZED, FOR SOLUTION INTRAVENOUS at 04:32

## 2020-08-21 RX ADMIN — Medication 5 ML: at 05:01

## 2020-08-21 RX ADMIN — ASPIRIN 81 MG: 81 TABLET, COATED ORAL at 08:11

## 2020-08-21 RX ADMIN — Medication 1 AMPULE: at 08:12

## 2020-08-21 NOTE — DISCHARGE SUMMARY
Date of Admission: 8/18/2020  Date of Discharge: 8/21/2020    Discharge Diagnoses:  1.  Sepsis in setting of right lower extremity cellulitis    Discharge Medications:  Discharge Medication List as of 8/21/2020 10:51 AM      START taking these medications    Details   cephALEXin (KEFLEX) 250 mg capsule Take 1 Cap by mouth two (2) times a day for 5 days. , Normal, Disp-10 Cap,R-0      doxycycline (ADOXA) 100 mg tablet Take 1 Tab by mouth two (2) times a day for 5 days. , Normal, Disp-10 Tab,R-0         CONTINUE these medications which have NOT CHANGED    Details   furosemide (LASIX) 40 mg tablet Take 1.5 Tabs by mouth daily. , Print, Disp-30 Tab, R-6      potassium chloride (K-DUR, KLOR-CON) 20 mEq tablet Take 2 Tabs by mouth daily. , Print, Disp-60 Tab, R-6      aspirin delayed-release 81 mg tablet Take 81 mg by mouth daily. Take / use AM day of surgery  per anesthesia protocols., Historical Med      predniSONE (DELTASONE) 5 mg tablet Take 5 mg by mouth daily. Take / use AM day of surgery  per anesthesia protocols., Historical Med      infliximab (REMICADE IV) by IntraVENous route. Every 7 weeks; last infusion 02/2019, Historical Med      acetaminophen (TYLENOL) 325 mg cap Take 1,000 mg by mouth every six (6) hours as needed for Pain. Take / use AM day of surgery  per anesthesia protocols if needed. Indications: Pain, Historical Med      HYDROcodone-acetaminophen (NORCO) 7.5-325 mg per tablet Take 1 Tab by mouth nightly. Indications: Pain, Historical Med      travoprost (TRAVATAN Z) 0.004 % ophthalmic solution Administer 1 Drop to both eyes nightly., Historical Med      brimonidine (ALPHAGAN P) 0.1 % ophthalmic solution Administer 1 Drop to left eye nightly., Historical Med      albuterol (PROVENTIL VENTOLIN) 2.5 mg /3 mL (0.083 %) nebulizer solution 2.5 mg by Nebulization route three (3) times daily as needed for Wheezing or Shortness of Breath. Take / use AM day of surgery  per anesthesia protocols if needed.  , Historical Med      tamsulosin (FLOMAX) 0.4 mg capsule Take 0.4 mg by mouth daily. Take / use AM day of surgery  per anesthesia protocols., Historical Med      albuterol (PROAIR HFA) 90 mcg/actuation inhaler Take 2 Puffs by inhalation every four (4) hours as needed for Wheezing or Shortness of Breath. Take / use AM day of surgery  per anesthesia protocol if needed., Historical Med      cholecalciferol (VITAMIN D3) 1,000 unit tablet Take 1,000 Units by mouth daily. , Historical Med              Pending Labs:  None    Follow-up (including scheduled tests): Follow-up Information     Follow up With Specialties Details Why Contact Info    Johnathan Dyer MD  On 8/27/2020 Apt time 3:15 pm [address] [facility] [id number] [phone]             History of Present Illness:  29-year-old male with past medical history of AAA, coronary artery disease, Heart failure with preserved ejection fraction, CKD stage III, COPD, hyperlipidemia, hypertension, presents in the setting of confusion associated with right lower extremity redness and pain. Per patient and wife, he was in his usual state of health until a few days ago when he started presented with worsening confusion associated with right lower extremity pain as well as some fever chills which did not improve over time so they decided to come to the emergency department. He denies any respiratory symptoms like cough, no shortness of breath, also no chest pain. No nausea vomiting or diarrhea. The wife also states his blood pressure in the morning was high so she gave him some extra Lasix. Here in the emergency department patient was found to be febrile, tachypneic, hypotensive with leukocytosis concerning of sepsis. He was given 1 dose of Zosyn in the emergency department. He will be admitted to the medical floors for further management of sepsis concerning of lower extremity cellulitis versus UTI.     Past Medical History:  Past Medical History: Diagnosis Date    AAA (abdominal aortic aneurysm) without rupture (HCC)     per CT scan dated 01/24/19=There is a infrarenal abdominal aortic aneurysm measuring 5.5 x 5.1 cm the celiac artery demonstrates stenosis at its origin. ; previous followed by=Ganga Price MD ; has a new pt. appt.  with Dr. Lennox Deaner on 05/23/19    Adverse effect of anesthesia     stopped breathing during gallbladder surgery in [facility]; had another surgery right after gallbladder surgery at [facility] and they all most lost him per patient's wife; no complications with recent surgeries in 2018 at [facility] CAD (coronary artery disease)     denies MI; no stents; hx. of CABG    CHF (congestive heart failure) ([address]     hospitalized 04/16/19; on Lasix     Chronic kidney disease     no nephrologist; stage 3- pt denies any kidney failure, only kidney stones; last creatinine=2.0 on 05/02/19     Chronic kidney disease, stage 3 (HCC)     Chronic pain     due to neuropathy    Complete heart block ([address] 02/13/2019    pacemaker     COPD (chronic obstructive pulmonary disease) ([address]     nebulizer prn; rescue inhaler prn; daily inhaler     Diastolic congestive heart failure ([address]     SANCHEZ (dyspnea on exertion)     Emphysema (subcutaneous) (surgical) resulting from a procedure     Emphysema lung ([address]     Former smoker     GERD (gastroesophageal reflux disease)     tums prn     Heart murmur     \"I was diagnosed at long time ago\"; last echo 03/21/19; s/p TAVR    Hemorrhoid     High cholesterol     no meds    History of kidney stones     multiple-surgical intervention and naturally pass    History of sepsis 10/2018    related to kidney stone    History of skin cancer     Hx of CABG 2005    Hypertension     no BP meds since TAVR    Hypoxia     Long term (current) use of systemic steroids     Prednisone    Lung cancer ([address]     stage 2; [facility] [place] [facility]; Bronchoscopy 05/09/19    Lung nodule     MRSA (methicillin resistant staph aureus) culture positive     positive nasal swab collected on PAT     MSSA (methicillin susceptible Staphylococcus aureus)     positive MSSA nasal swab collected at PAT    Nephrolithiasis     Neuropathy     feet and hands    On home oxygen therapy     2L/NC at bedtime and during the day prn for sob     Oxygen dependent     2 liters as needed    Pacemaker 02/13/2019    Biotronik     RA (rheumatoid arthritis) ([address]     managed with Remicade    Renal artery stenosis (HCC)     Severe aortic stenosis     S/P TAVR on 02/12/19    Tattoo     Left FA       Allergies:   Allergies   Allergen Reactions    Methotrexate Other (comments)     \"hemorrhage\" per wife       Hospital Course:  81-year-old male with past medical history of AAA, coronary artery disease, Heart failure with preserved ejection fraction, CKD stage III, COPD, hyperlipidemia, hypertension, presents in the setting of confusion associated with right lower extremity redness and pain.      1.  Sepsis likely in setting of right lower extremity cellulitis  - Started on zosyn and vancomycin  - One out of two blood culture positive for staph coagulase negative likely contaminant  - Repeated BCxs NGTD  - TTE without signs of endocarditis  - Improved on hemodynamics and RLE erythema  - Asymptomatic and hemodynamically stable on discharge  - Discharged on keflex and doxycycline      2. OCTAVIO on CKD III likely prerenal in the setting of sepsis  - Given IVF  - Improved renal function  - Crea on discharge 2.22    Procedures:  None    Discharge Day Information:  Follow with PMD    Discharge Physical Exam:  General: Alert, oriented, NAD  HEENT: NC/AT, EOM are intact  Neck: supple, no JVD  Cardiovascular: RRR, S1, S2, no murmurs  Respiratory: Lungs are clear, no wheezes or rales  Abdomen: Soft, NT, ND  Back: No CVA tenderness, no paraspinal tenderness  Extremities: LE without pedal edema, no erythema  Neuro: A&O, CN are intact, no focal deficits  Skin: no rash or ulcers  Psych: good mood and affect    Condition: Improved    Disposition: Home     Consultants During This Hospitalization: None      Spent 35 minutes on discharge services

## 2020-08-21 NOTE — PROGRESS NOTES
Met with patient and his daughter at bedside. PPE worn and appropriate social distancing observed during conversation in observation of COVID restrictions. Patient is discharging home today. He has home O2 through [address] and wears it nocturnally. He also has a walker available at home as needed. Patient's daughter will transport him home this morning. They voice that they are anxious to leave due to the weather. No new discharge planning needs identified at this time. Case Management will remain available to assist as needed. Care Management Interventions  PCP Verified by CM: (Dr. Terrie Yeung)  Mode of Transport at Discharge:  Other (see comment)(family to transport)  Transition of Care Consult (CM Consult): Discharge Planning  Physical Therapy Consult: Yes  Occupational Therapy Consult: Yes  Current Support Network: Lives with Spouse  Freedom of Choice List was Provided with Basic Dialogue that Supports the Patient's Individualized Plan of Care/Goals, Treatment Preferences and Shares the Quality Data Associated with the Providers?: Yes  Discharge Location  Discharge Placement: Home

## 2020-08-21 NOTE — DISCHARGE INSTRUCTIONS

## 2020-08-21 NOTE — PROGRESS NOTES
Discharge instructions were reviewed with the patient wife. Opportunity for questions given. Patient verbalized understanding of discharge and follow up instructions, as well as S/S to report to MD or return to ER for. PIV was removed. Prescriptions provided. Patient will D/C to home.

## 2020-08-23 LAB
BACTERIA SPEC CULT: NORMAL
SERVICE CMNT-IMP: NORMAL

## 2020-08-25 LAB
BACTERIA SPEC CULT: NORMAL
BACTERIA SPEC CULT: NORMAL
SERVICE CMNT-IMP: NORMAL
SERVICE CMNT-IMP: NORMAL

## 2020-10-06 ENCOUNTER — TRANSCRIBE ORDER (OUTPATIENT)
Dept: REGISTRATION | Age: 80
End: 2020-10-06

## 2020-10-06 ENCOUNTER — TRANSCRIBE ORDER (OUTPATIENT)
Dept: SCHEDULING | Age: 80
End: 2020-10-06

## 2020-10-06 DIAGNOSIS — C34.11 MALIGNANT NEOPLASM OF UPPER LOBE OF RIGHT LUNG (HCC): Primary | ICD-10-CM

## 2020-11-12 ENCOUNTER — HOSPITAL ENCOUNTER (OUTPATIENT)
Dept: CT IMAGING | Age: 80
Discharge: HOME | End: 2020-11-12
Attending: RADIOLOGY
Payer: MEDICARE

## 2020-11-12 DIAGNOSIS — C34.11 MALIGNANT NEOPLASM OF UPPER LOBE OF RIGHT LUNG (HCC): ICD-10-CM

## 2020-11-12 PROCEDURE — 71250 CT THORAX DX C-: CPT

## 2020-11-18 ENCOUNTER — TRANSCRIBE ORDER (OUTPATIENT)
Dept: SCHEDULING | Age: 80
End: 2020-11-18

## 2020-11-18 DIAGNOSIS — C34.11 MALIGNANT NEOPLASM OF UPPER LOBE OF RIGHT LUNG (HCC): Primary | ICD-10-CM

## 2021-05-13 ENCOUNTER — HOSPITAL ENCOUNTER (OUTPATIENT)
Dept: CT IMAGING | Age: 81
Discharge: HOME | End: 2021-05-13
Attending: RADIOLOGY
Payer: MEDICARE

## 2021-05-13 DIAGNOSIS — C34.11 MALIGNANT NEOPLASM OF UPPER LOBE OF RIGHT LUNG (HCC): ICD-10-CM

## 2021-05-13 PROCEDURE — 71250 CT THORAX DX C-: CPT

## 2021-07-09 PROBLEM — I48.0 PAROXYSMAL ATRIAL FIBRILLATION (HCC): Chronic | Status: ACTIVE | Noted: 2021-07-09

## 2021-07-19 ENCOUNTER — TRANSCRIBE ORDER (OUTPATIENT)
Dept: SCHEDULING | Age: 81
End: 2021-07-19

## 2021-07-19 DIAGNOSIS — C34.11 MALIGNANT NEOPLASM OF UPPER LOBE OF RIGHT LUNG (HCC): Primary | ICD-10-CM

## 2021-10-18 ENCOUNTER — TRANSCRIBE ORDER (OUTPATIENT)
Dept: SCHEDULING | Age: 81
End: 2021-10-18

## 2021-10-18 DIAGNOSIS — C34.11 MALIGNANT NEOPLASM OF UPPER LOBE OF RIGHT LUNG (HCC): Primary | ICD-10-CM

## 2021-10-28 ENCOUNTER — HOSPITAL ENCOUNTER (OUTPATIENT)
Dept: CT IMAGING | Age: 81
Discharge: HOME | End: 2021-10-28
Attending: RADIOLOGY
Payer: MEDICARE

## 2021-10-28 DIAGNOSIS — C34.11 MALIGNANT NEOPLASM OF UPPER LOBE OF RIGHT LUNG (HCC): ICD-10-CM

## 2021-10-28 PROCEDURE — 71250 CT THORAX DX C-: CPT

## 2021-11-03 ENCOUNTER — TRANSCRIBE ORDER (OUTPATIENT)
Dept: SCHEDULING | Age: 81
End: 2021-11-03

## 2021-11-03 DIAGNOSIS — C34.11 MALIGNANT NEOPLASM OF UPPER LOBE OF RIGHT LUNG (HCC): Primary | ICD-10-CM

## 2022-01-13 PROBLEM — G30.1 LATE ONSET ALZHEIMER DEMENTIA (HCC): Chronic | Status: ACTIVE | Noted: 2022-01-13

## 2022-01-13 PROBLEM — F02.80 LATE ONSET ALZHEIMER DEMENTIA (HCC): Chronic | Status: ACTIVE | Noted: 2022-01-13

## 2022-03-18 PROBLEM — J44.9 COPD (CHRONIC OBSTRUCTIVE PULMONARY DISEASE) (HCC): Status: ACTIVE | Noted: 2019-01-04

## 2022-03-18 PROBLEM — Z95.2 S/P TAVR (TRANSCATHETER AORTIC VALVE REPLACEMENT): Status: ACTIVE | Noted: 2019-02-12

## 2022-03-18 PROBLEM — G30.1 LATE ONSET ALZHEIMER DEMENTIA (HCC): Status: ACTIVE | Noted: 2022-01-13

## 2022-03-18 PROBLEM — I70.1 ATHEROSCLEROTIC RAS (RENAL ARTERY STENOSIS), BILATERAL (HCC): Status: ACTIVE | Noted: 2019-04-17

## 2022-03-18 PROBLEM — Z95.0 S/P PLACEMENT OF CARDIAC PACEMAKER: Status: ACTIVE | Noted: 2019-02-13

## 2022-03-18 PROBLEM — F02.80 LATE ONSET ALZHEIMER DEMENTIA (HCC): Status: ACTIVE | Noted: 2022-01-13

## 2022-03-18 PROBLEM — I71.40 ABDOMINAL AORTIC ANEURYSM (AAA) WITHOUT RUPTURE: Status: ACTIVE | Noted: 2019-06-18

## 2022-03-18 PROBLEM — I48.0 PAROXYSMAL ATRIAL FIBRILLATION (HCC): Status: ACTIVE | Noted: 2021-07-09

## 2022-03-19 PROBLEM — C34.11 MALIGNANT NEOPLASM OF UPPER LOBE OF RIGHT LUNG (HCC): Status: ACTIVE | Noted: 2019-05-09

## 2022-03-19 PROBLEM — I10 HYPERTENSION: Status: ACTIVE | Noted: 2019-02-07

## 2022-03-19 PROBLEM — I71.40 AAA (ABDOMINAL AORTIC ANEURYSM) WITHOUT RUPTURE: Status: ACTIVE | Noted: 2019-02-12

## 2022-03-19 PROBLEM — E87.70 VOLUME OVERLOAD: Status: ACTIVE | Noted: 2019-04-16

## 2022-03-19 PROBLEM — M06.9 RA (RHEUMATOID ARTHRITIS) (HCC): Status: ACTIVE | Noted: 2019-02-12

## 2022-03-19 PROBLEM — R00.1 BRADYCARDIA: Status: ACTIVE | Noted: 2019-09-10

## 2022-03-19 PROBLEM — R06.02 SOB (SHORTNESS OF BREATH): Status: ACTIVE | Noted: 2019-04-16

## 2022-03-19 PROBLEM — E78.00 HIGH CHOLESTEROL: Status: ACTIVE | Noted: 2019-02-12

## 2022-03-19 PROBLEM — R09.02 HYPOXIA: Status: ACTIVE | Noted: 2019-04-16

## 2022-03-19 PROBLEM — I50.32 CHRONIC DIASTOLIC CONGESTIVE HEART FAILURE (HCC): Status: ACTIVE | Noted: 2019-04-23

## 2022-03-19 PROBLEM — R91.1 LUNG NODULE: Status: ACTIVE | Noted: 2019-03-21

## 2022-03-19 PROBLEM — N18.30 STAGE 3 CHRONIC KIDNEY DISEASE (HCC): Status: ACTIVE | Noted: 2019-04-17

## 2022-03-20 PROBLEM — I25.10 CORONARY ARTERY DISEASE INVOLVING NATIVE CORONARY ARTERY OF NATIVE HEART WITHOUT ANGINA PECTORIS: Status: ACTIVE | Noted: 2019-01-04

## 2022-03-20 PROBLEM — A41.9 SEPSIS (HCC): Status: ACTIVE | Noted: 2020-08-18

## 2022-03-20 PROBLEM — Z95.1 S/P CABG (CORONARY ARTERY BYPASS GRAFT): Status: ACTIVE | Noted: 2019-04-16

## 2022-07-13 ENCOUNTER — TELEPHONE (OUTPATIENT)
Dept: CARDIOLOGY CLINIC | Age: 82
End: 2022-07-13

## 2022-07-13 NOTE — TELEPHONE ENCOUNTER
Patients' wife called stating he has tested positive for COVID and had to move his appointment out. Wife states she would like to know if there are any therapeutics he could take for treatment? Wife states he was hospitalized for pneumonia last week. Please call and advise.

## 2022-07-13 NOTE — TELEPHONE ENCOUNTER
Called patients wife and informed her that there is not any treatment for Covid, if patient gets short of breath he needs to go to the ER. Patient also needs to cancel device appointment and appointment with Dr. Marissa Cordon on Friday.

## 2022-08-08 PROCEDURE — 93294 REM INTERROG EVL PM/LDLS PM: CPT | Performed by: INTERNAL MEDICINE

## 2022-08-08 PROCEDURE — 93296 REM INTERROG EVL PM/IDS: CPT | Performed by: INTERNAL MEDICINE

## 2022-08-28 DIAGNOSIS — I48.0 PAROXYSMAL ATRIAL FIBRILLATION (HCC): Primary | ICD-10-CM

## 2022-08-28 DIAGNOSIS — I48.0 PAROXYSMAL ATRIAL FIBRILLATION (HCC): ICD-10-CM

## 2022-08-28 DIAGNOSIS — Z95.0 S/P PLACEMENT OF CARDIAC PACEMAKER: ICD-10-CM

## (undated) DEVICE — Z DUP USE 2381765 CATHETER DIAG 180DEG FIRM TIP EWC EDGE 180 [SDK3000OLYMPUS] [SUPERDIMENSION INC]

## (undated) DEVICE — MOUTHPIECE ENDOSCP 20X27MM --

## (undated) DEVICE — MARKER RAD L7MM DIA4MM NIT COIL SUPERLOCK

## (undated) DEVICE — ELECTRODE DEFIB CARD W/ LVP GEL MULTFUNC PRO-PADZ

## (undated) DEVICE — STERILE LATEX POWDER-FREE SURGICAL GLOVESWITH NITRILE COATING: Brand: PROTEXIS

## (undated) DEVICE — KENDALL RADIOLUCENT FOAM MONITORING ELECTRODE RECTANGULAR SHAPE: Brand: KENDALL

## (undated) DEVICE — SUTURE PERMAHAND SZ 0 L30IN NONABSORBABLE BLK L30MM PSL 3/8 590H

## (undated) DEVICE — Device

## (undated) DEVICE — X-RAY SPONGES,12 PLY: Brand: DERMACEA

## (undated) DEVICE — MEDI-VAC NON-CONDUCTIVE SUCTION TUBING: Brand: CARDINAL HEALTH

## (undated) DEVICE — PAD THRM L UNIV NONSLIP HYDRGEL RECT PROVIDE OPTIMAL ENERGY

## (undated) DEVICE — SUTURE VCRL SZ 2-0 L36IN ABSRB UD L36MM CT-1 1/2 CIR J945H

## (undated) DEVICE — SURGICAL PROCEDURE PACK SPEC

## (undated) DEVICE — CATHETER ETER TY TEMP SENS F MBO W URIN M FOLLOWS CDC GUIDELINES TO

## (undated) DEVICE — (D)SYR 10ML 1/5ML GRAD NSAF -- PKGING CHANGE USE ITEM 338027

## (undated) DEVICE — SHEATH INTRO 18FR L33CM OD6.7MM ID6MM HYDRPHLC KINK

## (undated) DEVICE — APPLICATOR BNDG 1MM ADH PREMIERPRO EXOFIN

## (undated) DEVICE — RADIFOCUS GLIDECATH: Brand: GLIDECATH

## (undated) DEVICE — SOLUTION IV 1000ML 0.9% SOD CHL

## (undated) DEVICE — SYRINGE ANGIO CNTRST DEL 20 CC POLYCARB DK GRN MEDALLION

## (undated) DEVICE — REM POLYHESIVE ADULT PATIENT RETURN ELECTRODE: Brand: VALLEYLAB

## (undated) DEVICE — INTENDED TO BE USED TO OCCLUDE, RETRACT AND IDENTIFY ARTERIES, VEINS, TENDONS AND NERVES IN SURGICAL PROCEDURES: Brand: STERION®  VESSEL LOOP

## (undated) DEVICE — SYR ART 700 CLEAR MARK 7 -- ARTERION

## (undated) DEVICE — SYR LR LCK 1ML GRAD NSAF 30ML --

## (undated) DEVICE — SINGLE USE SUCTION VALVE MAJ-209: Brand: SINGLE USE SUCTION VALVE (STERILE)

## (undated) DEVICE — CARDINAL HEALTH FLEXIBLE LIGHT HANDLE COVER: Brand: CARDINAL HEALTH

## (undated) DEVICE — ABSORBENT, WATERPROOF, BACTERIA PROOF FILM DRESSING: Brand: OPSITE POST OP 9.5X8.5CM CTN 20

## (undated) DEVICE — GEL US 20GM NONIRRITATING OVERWRAPPED FILE PCH TRNSMIT

## (undated) DEVICE — APPLIER LIG CLP M L11IN TI STR RNG HNDL FOR 20 CLP DISP

## (undated) DEVICE — TELFA NON-ADHERENT ABSORBENT DRESSING: Brand: TELFA

## (undated) DEVICE — COVER,TABLE,HEAVY DUTY,79"X110",STRL: Brand: MEDLINE

## (undated) DEVICE — CANNULA NSL ORAL AD FOR CAPNOFLEX CO2 O2 AIRLFE

## (undated) DEVICE — SINGLE USE ASPIRATION NEEDLE: Brand: SINGLE USE ASPIRATION NEEDLE

## (undated) DEVICE — BASIC SINGLE BASIN-LF: Brand: MEDLINE INDUSTRIES, INC.

## (undated) DEVICE — RADIFOCUS GLIDEWIRE: Brand: GLIDEWIRE

## (undated) DEVICE — DRAPE SURG SPEC PROC 4 PC

## (undated) DEVICE — CATHETER ANGIO 5FR L80CM 0.035IN SOS OMNI 2 VIS SEL SFT

## (undated) DEVICE — CATHETER VASC AD 5FR L65CM 0.038 DIAG KA 2 PERIPH W/O

## (undated) DEVICE — 12 FOOT DISPOSABLE EXTENSION CABLE WITH SAFE CONNECT / SCREW-DOWN

## (undated) DEVICE — PATCH SENS PT FOR ELECTROMAGNETIC NAVIGATION BRONCHSCP SYS

## (undated) DEVICE — DRAPE TWL SURG 16X26IN BLU ORB04] ALLCARE INC]

## (undated) DEVICE — KIT MRK DEL NAVIGATION CATH L125CM OD0.070IN ID0.040IN

## (undated) DEVICE — GOWN,PREVENTION PLUS,2XL,ST,22/CS: Brand: MEDLINE

## (undated) DEVICE — DISH PTRI STRL --

## (undated) DEVICE — SUTURE PERMAHAND SZ 3-0 L18IN NONABSORBABLE BLK SILK BRAID A184H

## (undated) DEVICE — SYRINGE MED 20ML WHT PLUNG CLR POLYCARB BRL FIX M LUER CONN

## (undated) DEVICE — GUIDEWIRE VASC L260CM DIA0.035IN TAPR L11CM FLPY TIP L4CM

## (undated) DEVICE — 2000CC GUARDIAN II: Brand: GUARDIAN

## (undated) DEVICE — SET EXTN L6IN W/ S STL CLMP

## (undated) DEVICE — TUBING CNTRST INJ HI PRESSURE 108 IN

## (undated) DEVICE — FORCEPS BX WRK L110MM CHN L2MM DIA1.7MM SUPERDIMENSION

## (undated) DEVICE — APPLIER CLP L9.375IN APER 2.1MM CLS L3.8MM 20 SM TI CLP

## (undated) DEVICE — SUTURE VCRL SZ 4-0 L27IN ABSRB UD L19MM PS-2 3/8 CIR PRIM J426H

## (undated) DEVICE — (D)PREP SKN CHLRAPRP APPL 26ML -- CONVERT TO ITEM 371833

## (undated) DEVICE — Device: Brand: BALLOON

## (undated) DEVICE — SUTURE PERMAHAND SZ 2-0 L12X18IN NONABSORBABLE BLK SILK A185H

## (undated) DEVICE — CATHETER ANGIO 5FR L70CM 0.038IN SEG 20CM PGTL FLSH 20 1

## (undated) DEVICE — 3M™ IOBAN™ 2 ANTIMICROBIAL INCISE DRAPE 6650EZ: Brand: IOBAN™ 2

## (undated) DEVICE — GOWN,REINF,POLY,ECL,PP SLV,XL: Brand: MEDLINE

## (undated) DEVICE — HI-TORQUE VERSACORE FLOPPY GUIDE WIRE SYSTEM 175 CM: Brand: HI-TORQUE VERSACORE

## (undated) DEVICE — BRONCHOSCOPY PACK: Brand: MEDLINE INDUSTRIES, INC.

## (undated) DEVICE — ADAPTER BRONCHSCP FOR USE W/ OLY EDGE

## (undated) DEVICE — CATHETER PTCA BLLN L4CM INFL 10-37MM CATH L90CM MOLDING

## (undated) DEVICE — SUTURE PROL SZ 6-0 L24IN NONABSORBABLE BLU BV-1 L9.3MM 3/8 M8805

## (undated) DEVICE — ELECTRD ECG DEFIB/MULT-USE AD --

## (undated) DEVICE — SHTH INTRO FLEX 12FR 33CM -- DRYSEAL

## (undated) DEVICE — 3M™ IOBAN™ 2 ANTIMICROBIAL INCISE DRAPE 6651EZ: Brand: IOBAN™ 2

## (undated) DEVICE — SOL INJ SOD CL0.9% 10ML PREFIL --

## (undated) DEVICE — PERCUTANEOUS ENTRY THINWALL NEEDLE  ONE-PART: Brand: COOK